# Patient Record
Sex: FEMALE | Race: WHITE | NOT HISPANIC OR LATINO | Employment: OTHER | URBAN - METROPOLITAN AREA
[De-identification: names, ages, dates, MRNs, and addresses within clinical notes are randomized per-mention and may not be internally consistent; named-entity substitution may affect disease eponyms.]

---

## 2018-01-01 ENCOUNTER — HOSPITAL ENCOUNTER (INPATIENT)
Facility: HOSPITAL | Age: 83
LOS: 4 days | Discharge: NURSING, REHAB OR LONG-TERM CARE | End: 2018-11-20
Attending: EMERGENCY MEDICINE | Admitting: INTERNAL MEDICINE

## 2018-01-01 ENCOUNTER — APPOINTMENT (OUTPATIENT)
Dept: GENERAL RADIOLOGY | Facility: HOSPITAL | Age: 83
End: 2018-01-01

## 2018-01-01 ENCOUNTER — HOSPITAL ENCOUNTER (EMERGENCY)
Facility: HOSPITAL | Age: 83
Discharge: HOME | End: 2018-05-02
Attending: EMERGENCY MEDICINE | Admitting: EMERGENCY MEDICINE

## 2018-01-01 ENCOUNTER — APPOINTMENT (OUTPATIENT)
Dept: CT IMAGING | Facility: HOSPITAL | Age: 83
End: 2018-01-01

## 2018-01-01 ENCOUNTER — HOSPITAL ENCOUNTER (EMERGENCY)
Facility: HOSPITAL | Age: 83
Discharge: HOME | End: 2018-12-23
Attending: EMERGENCY MEDICINE | Admitting: EMERGENCY MEDICINE

## 2018-01-01 ENCOUNTER — APPOINTMENT (OUTPATIENT)
Dept: MRI IMAGING | Facility: HOSPITAL | Age: 83
End: 2018-01-01

## 2018-01-01 ENCOUNTER — APPOINTMENT (OUTPATIENT)
Dept: CARDIOLOGY | Facility: HOSPITAL | Age: 83
End: 2018-01-01

## 2018-01-01 ENCOUNTER — HOSPITAL ENCOUNTER (EMERGENCY)
Facility: HOSPITAL | Age: 83
Discharge: HOME | End: 2018-12-26
Attending: EMERGENCY MEDICINE | Admitting: EMERGENCY MEDICINE

## 2018-01-01 ENCOUNTER — HOSPITAL ENCOUNTER (EMERGENCY)
Facility: HOSPITAL | Age: 83
Discharge: HOME | End: 2018-12-29
Attending: EMERGENCY MEDICINE | Admitting: EMERGENCY MEDICINE

## 2018-01-01 ENCOUNTER — LAB REQUISITION (OUTPATIENT)
Dept: LAB | Facility: HOSPITAL | Age: 83
End: 2018-01-01

## 2018-01-01 ENCOUNTER — OFFICE VISIT (OUTPATIENT)
Dept: ORTHOPEDIC SURGERY | Facility: CLINIC | Age: 83
End: 2018-01-01

## 2018-01-01 VITALS
HEART RATE: 62 BPM | DIASTOLIC BLOOD PRESSURE: 77 MMHG | WEIGHT: 132.28 LBS | HEIGHT: 67 IN | BODY MASS INDEX: 20.76 KG/M2 | TEMPERATURE: 97.7 F | OXYGEN SATURATION: 94 % | SYSTOLIC BLOOD PRESSURE: 159 MMHG | RESPIRATION RATE: 18 BRPM

## 2018-01-01 VITALS — HEART RATE: 80 BPM | OXYGEN SATURATION: 97 % | BODY MASS INDEX: 21.18 KG/M2 | HEIGHT: 62 IN | WEIGHT: 115.08 LBS

## 2018-01-01 VITALS
WEIGHT: 135 LBS | TEMPERATURE: 97.8 F | HEIGHT: 67 IN | OXYGEN SATURATION: 100 % | HEART RATE: 68 BPM | DIASTOLIC BLOOD PRESSURE: 80 MMHG | BODY MASS INDEX: 21.19 KG/M2 | SYSTOLIC BLOOD PRESSURE: 142 MMHG | RESPIRATION RATE: 14 BRPM

## 2018-01-01 VITALS
BODY MASS INDEX: 21.16 KG/M2 | TEMPERATURE: 98.3 F | SYSTOLIC BLOOD PRESSURE: 192 MMHG | OXYGEN SATURATION: 95 % | HEIGHT: 62 IN | RESPIRATION RATE: 16 BRPM | DIASTOLIC BLOOD PRESSURE: 90 MMHG | WEIGHT: 115 LBS | HEART RATE: 72 BPM

## 2018-01-01 VITALS
DIASTOLIC BLOOD PRESSURE: 95 MMHG | BODY MASS INDEX: 18.4 KG/M2 | SYSTOLIC BLOOD PRESSURE: 174 MMHG | HEART RATE: 75 BPM | WEIGHT: 100 LBS | TEMPERATURE: 97.8 F | HEIGHT: 62 IN | OXYGEN SATURATION: 95 % | RESPIRATION RATE: 16 BRPM

## 2018-01-01 VITALS
DIASTOLIC BLOOD PRESSURE: 90 MMHG | SYSTOLIC BLOOD PRESSURE: 166 MMHG | RESPIRATION RATE: 16 BRPM | HEART RATE: 72 BPM | OXYGEN SATURATION: 93 % | HEIGHT: 70 IN | TEMPERATURE: 97.5 F | WEIGHT: 120 LBS | BODY MASS INDEX: 17.18 KG/M2

## 2018-01-01 DIAGNOSIS — N39.0 BACTERIAL UTI: ICD-10-CM

## 2018-01-01 DIAGNOSIS — H10.32 ACUTE CONJUNCTIVITIS OF LEFT EYE, UNSPECIFIED ACUTE CONJUNCTIVITIS TYPE: ICD-10-CM

## 2018-01-01 DIAGNOSIS — I67.82 CEREBRAL ISCHEMIA: ICD-10-CM

## 2018-01-01 DIAGNOSIS — G93.40 ENCEPHALOPATHY ACUTE: ICD-10-CM
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
## 2018-01-01 DIAGNOSIS — R13.10 DYSPHAGIA, UNSPECIFIED TYPE: Primary | ICD-10-CM

## 2018-01-01 DIAGNOSIS — R53.1 GENERALIZED WEAKNESS: ICD-10-CM

## 2018-01-01 DIAGNOSIS — A49.9 BACTERIAL UTI: ICD-10-CM

## 2018-01-01 DIAGNOSIS — Z78.9 IMPAIRED MOBILITY AND ADLS: ICD-10-CM

## 2018-01-01 DIAGNOSIS — S09.90XA CLOSED HEAD INJURY, INITIAL ENCOUNTER: Primary | ICD-10-CM

## 2018-01-01 DIAGNOSIS — R55 VASOVAGAL SYNCOPE: Primary | ICD-10-CM

## 2018-01-01 DIAGNOSIS — H10.9 CONJUNCTIVITIS OF LEFT EYE, UNSPECIFIED CONJUNCTIVITIS TYPE: ICD-10-CM

## 2018-01-01 DIAGNOSIS — S52.532A CLOSED COLLES' FRACTURE OF LEFT RADIUS, INITIAL ENCOUNTER: Primary | ICD-10-CM

## 2018-01-01 DIAGNOSIS — E87.6 HYPOKALEMIA: ICD-10-CM

## 2018-01-01 DIAGNOSIS — Z00.00 ROUTINE GENERAL MEDICAL EXAMINATION AT A HEALTH CARE FACILITY: ICD-10-CM

## 2018-01-01 DIAGNOSIS — J40 BRONCHITIS: ICD-10-CM

## 2018-01-01 DIAGNOSIS — R53.83 LETHARGY: Primary | ICD-10-CM

## 2018-01-01 DIAGNOSIS — Z74.09 IMPAIRED MOBILITY AND ADLS: ICD-10-CM

## 2018-01-01 DIAGNOSIS — S62.102A LEFT WRIST FRACTURE, CLOSED, INITIAL ENCOUNTER: ICD-10-CM
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
## 2018-01-01 DIAGNOSIS — R30.0 DYSURIA: ICD-10-CM

## 2018-01-01 DIAGNOSIS — R04.0 EPISTAXIS: ICD-10-CM

## 2018-01-01 DIAGNOSIS — Z74.09 IMPAIRED FUNCTIONAL MOBILITY, BALANCE, GAIT, AND ENDURANCE: ICD-10-CM

## 2018-01-01 LAB
ALBUMIN SERPL-MCNC: 3.6 G/DL (ref 3.2–4.8)
ALBUMIN SERPL-MCNC: 4.11 G/DL (ref 3.2–4.8)
ALBUMIN SERPL-MCNC: 4.17 G/DL (ref 3.2–4.8)
ALBUMIN SERPL-MCNC: 4.3 G/DL (ref 3.2–4.8)
ALBUMIN/GLOB SERPL: 1.3 G/DL (ref 1.5–2.5)
ALBUMIN/GLOB SERPL: 1.5 G/DL (ref 1.5–2.5)
ALBUMIN/GLOB SERPL: 1.6 G/DL (ref 1.5–2.5)
ALBUMIN/GLOB SERPL: 1.7 G/DL (ref 1.5–2.5)
ALP SERPL-CCNC: 57 U/L (ref 25–100)
ALP SERPL-CCNC: 66 U/L (ref 25–100)
ALP SERPL-CCNC: 76 U/L (ref 25–100)
ALP SERPL-CCNC: 82 U/L (ref 25–100)
ALT SERPL W P-5'-P-CCNC: 13 U/L (ref 7–40)
ALT SERPL W P-5'-P-CCNC: 16 U/L (ref 7–40)
ALT SERPL W P-5'-P-CCNC: 19 U/L (ref 7–40)
ALT SERPL W P-5'-P-CCNC: 26 U/L (ref 7–40)
ANION GAP SERPL CALCULATED.3IONS-SCNC: 10 MMOL/L (ref 3–11)
ANION GAP SERPL CALCULATED.3IONS-SCNC: 5 MMOL/L (ref 3–11)
ANION GAP SERPL CALCULATED.3IONS-SCNC: 8 MMOL/L (ref 3–11)
ANION GAP SERPL CALCULATED.3IONS-SCNC: 9 MMOL/L (ref 3–11)
ARTICHOKE IGE QN: 136 MG/DL (ref 0–130)
ARTICHOKE IGE QN: 137 MG/DL (ref 0–130)
AST SERPL-CCNC: 15 U/L (ref 0–33)
AST SERPL-CCNC: 17 U/L (ref 0–33)
AST SERPL-CCNC: 18 U/L (ref 0–33)
AST SERPL-CCNC: 20 U/L (ref 0–33)
BACTERIA SPEC AEROBE CULT: ABNORMAL
BACTERIA SPEC AEROBE CULT: NORMAL
BACTERIA UR QL AUTO: ABNORMAL /HPF
BACTERIA UR QL AUTO: NORMAL /HPF
BASOPHILS # BLD AUTO: 0 10*3/MM3 (ref 0–0.2)
BASOPHILS # BLD AUTO: 0.01 10*3/MM3 (ref 0–0.2)
BASOPHILS # BLD AUTO: 0.02 10*3/MM3 (ref 0–0.2)
BASOPHILS # BLD AUTO: 0.02 10*3/MM3 (ref 0–0.2)
BASOPHILS NFR BLD AUTO: 0 % (ref 0–1)
BASOPHILS NFR BLD AUTO: 0.1 % (ref 0–1)
BASOPHILS NFR BLD AUTO: 0.2 % (ref 0–1)
BH CV ECHO MEAS - AO MAX PG (FULL): 0.71 MMHG
BH CV ECHO MEAS - AO MAX PG: 6 MMHG
BH CV ECHO MEAS - AO MEAN PG (FULL): 0.67 MMHG
BH CV ECHO MEAS - AO MEAN PG: 3.6 MMHG
BH CV ECHO MEAS - AO ROOT AREA: 7.7 CM^2
BH CV ECHO MEAS - AO ROOT DIAM: 3.1 CM
BH CV ECHO MEAS - AO V2 MAX: 124.9 CM/SEC
BH CV ECHO MEAS - AO V2 MEAN: 89.4 CM/SEC
BH CV ECHO MEAS - AO V2 VTI: 29.3 CM
BH CV ECHO MEAS - ASC AORTA: 2.8 CM
BH CV ECHO MEAS - AVA(I,A): 2.6 CM^2
BH CV ECHO MEAS - AVA(I,D): 2.6 CM^2
BH CV ECHO MEAS - AVA(V,A): 2.8 CM^2
BH CV ECHO MEAS - AVA(V,D): 2.8 CM^2
BH CV ECHO MEAS - BSA(HAYCOCK): 1.7 M^2
BH CV ECHO MEAS - BSA: 1.7 M^2
BH CV ECHO MEAS - BZI_BMI: 20.7 KILOGRAMS/M^2
BH CV ECHO MEAS - BZI_METRIC_HEIGHT: 170.2 CM
BH CV ECHO MEAS - BZI_METRIC_WEIGHT: 59.9 KG
BH CV ECHO MEAS - EDV(CUBED): 74.5 ML
BH CV ECHO MEAS - EDV(TEICH): 78.9 ML
BH CV ECHO MEAS - EF(CUBED): 67.8 %
BH CV ECHO MEAS - EF(TEICH): 59.7 %
BH CV ECHO MEAS - ESV(CUBED): 24 ML
BH CV ECHO MEAS - ESV(TEICH): 31.8 ML
BH CV ECHO MEAS - FS: 31.5 %
BH CV ECHO MEAS - IVS/LVPW: 0.76
BH CV ECHO MEAS - IVSD: 0.88 CM
BH CV ECHO MEAS - LA DIMENSION: 2.7 CM
BH CV ECHO MEAS - LA/AO: 0.87
BH CV ECHO MEAS - LAT PEAK E' VEL: 14 CM/SEC
BH CV ECHO MEAS - LATERAL E/E' RATIO: 5.3
BH CV ECHO MEAS - LV MASS(C)D: 140.7 GRAMS
BH CV ECHO MEAS - LV MAX PG: 5.3 MMHG
BH CV ECHO MEAS - LV MEAN PG: 2.9 MMHG
BH CV ECHO MEAS - LV V1 MAX: 115 CM/SEC
BH CV ECHO MEAS - LV V1 MEAN: 77.4 CM/SEC
BH CV ECHO MEAS - LV V1 VTI: 25.1 CM
BH CV ECHO MEAS - LVIDD: 4.2 CM
BH CV ECHO MEAS - LVIDS: 2.9 CM
BH CV ECHO MEAS - LVOT AREA (M): 3.1 CM^2
BH CV ECHO MEAS - LVOT AREA: 3.1 CM^2
BH CV ECHO MEAS - LVOT DIAM: 2 CM
BH CV ECHO MEAS - LVPWD: 1.2 CM
BH CV ECHO MEAS - MED PEAK E' VEL: 4.9 CM/SEC
BH CV ECHO MEAS - MEDIAL E/E' RATIO: 14.8
BH CV ECHO MEAS - MV A MAX VEL: 89.8 CM/SEC
BH CV ECHO MEAS - MV DEC TIME: 0.18 SEC
BH CV ECHO MEAS - MV E MAX VEL: 75 CM/SEC
BH CV ECHO MEAS - MV E/A: 0.84
BH CV ECHO MEAS - PA ACC SLOPE: 483.3 CM/SEC^2
BH CV ECHO MEAS - PA ACC TIME: 0.13 SEC
BH CV ECHO MEAS - PA MAX PG: 3.3 MMHG
BH CV ECHO MEAS - PA PR(ACCEL): 18.8 MMHG
BH CV ECHO MEAS - PA V2 MAX: 91.2 CM/SEC
BH CV ECHO MEAS - RAP SYSTOLE: 8 MMHG
BH CV ECHO MEAS - RVSP: 32 MMHG
BH CV ECHO MEAS - SV(AO): 226.4 ML
BH CV ECHO MEAS - SV(CUBED): 50.5 ML
BH CV ECHO MEAS - SV(LVOT): 77.1 ML
BH CV ECHO MEAS - SV(TEICH): 47.1 ML
BH CV ECHO MEAS - TAPSE (>1.6): 1.5 CM2
BH CV ECHO MEAS - TR MAX PG: 24 MMHG
BH CV ECHO MEAS - TR MAX VEL: 245.5 CM/SEC
BH CV ECHO MEAS - TV MAX PG: 0.62 MMHG
BH CV ECHO MEAS - TV V2 MAX: 39.5 CM/SEC
BH CV ECHO MEASUREMENTS AVERAGE E/E' RATIO: 7.94
BH CV XLRA - RV BASE: 3.1 CM
BH CV XLRA - RV LENGTH: 7.3 CM
BH CV XLRA - RV MID: 2.6 CM
BH CV XLRA - TDI S': 12.7 CM/SEC
BH CV XLRA MEAS LEFT DIST CCA EDV: 14.1 CM/SEC
BH CV XLRA MEAS LEFT DIST CCA PSV: 58.1 CM/SEC
BH CV XLRA MEAS LEFT DIST ICA EDV: 17.6 CM/SEC
BH CV XLRA MEAS LEFT DIST ICA PSV: 78.6 CM/SEC
BH CV XLRA MEAS LEFT ICA/CCA RATIO: 1.2
BH CV XLRA MEAS LEFT MID CCA EDV: 14.9 CM/SEC
BH CV XLRA MEAS LEFT MID CCA PSV: 78.6 CM/SEC
BH CV XLRA MEAS LEFT MID ICA EDV: 14.5 CM/SEC
BH CV XLRA MEAS LEFT MID ICA PSV: 71 CM/SEC
BH CV XLRA MEAS LEFT PROX CCA EDV: 13.4 CM/SEC
BH CV XLRA MEAS LEFT PROX CCA PSV: 83.3 CM/SEC
BH CV XLRA MEAS LEFT PROX ECA PSV: 65.4 CM/SEC
BH CV XLRA MEAS LEFT PROX ICA EDV: 10.7 CM/SEC
BH CV XLRA MEAS LEFT PROX ICA PSV: 44.6 CM/SEC
BH CV XLRA MEAS LEFT PROX SCLA PSV: 110 CM/SEC
BH CV XLRA MEAS LEFT VERTEBRAL A EDV: 11.9 CM/SEC
BH CV XLRA MEAS LEFT VERTEBRAL A PSV: 36.5 CM/SEC
BH CV XLRA MEAS RIGHT DIST CCA EDV: 9.4 CM/SEC
BH CV XLRA MEAS RIGHT DIST CCA PSV: 44.7 CM/SEC
BH CV XLRA MEAS RIGHT DIST ICA EDV: 11.2 CM/SEC
BH CV XLRA MEAS RIGHT DIST ICA PSV: 61.5 CM/SEC
BH CV XLRA MEAS RIGHT ICA/CCA RATIO: 1.04
BH CV XLRA MEAS RIGHT MID CCA EDV: 9.4 CM/SEC
BH CV XLRA MEAS RIGHT MID CCA PSV: 64 CM/SEC
BH CV XLRA MEAS RIGHT MID ICA EDV: 10.8 CM/SEC
BH CV XLRA MEAS RIGHT MID ICA PSV: 47.8 CM/SEC
BH CV XLRA MEAS RIGHT PROX CCA EDV: 7.7 CM/SEC
BH CV XLRA MEAS RIGHT PROX CCA PSV: 70.6 CM/SEC
BH CV XLRA MEAS RIGHT PROX ECA PSV: 100.4 CM/SEC
BH CV XLRA MEAS RIGHT PROX ICA EDV: 8.4 CM/SEC
BH CV XLRA MEAS RIGHT PROX ICA PSV: 42.2 CM/SEC
BH CV XLRA MEAS RIGHT PROX SCLA PSV: 42.6 CM/SEC
BH CV XLRA MEAS RIGHT VERTEBRAL A EDV: 7.9 CM/SEC
BH CV XLRA MEAS RIGHT VERTEBRAL A PSV: 44.1 CM/SEC
BILIRUB SERPL-MCNC: 0.2 MG/DL (ref 0.3–1.2)
BILIRUB SERPL-MCNC: 0.4 MG/DL (ref 0.3–1.2)
BILIRUB SERPL-MCNC: 0.5 MG/DL (ref 0.3–1.2)
BILIRUB SERPL-MCNC: 0.6 MG/DL (ref 0.3–1.2)
BILIRUB UR QL STRIP: NEGATIVE
BUN BLD-MCNC: 13 MG/DL (ref 9–23)
BUN BLD-MCNC: 14 MG/DL (ref 9–23)
BUN BLD-MCNC: 14 MG/DL (ref 9–23)
BUN BLD-MCNC: 15 MG/DL (ref 9–23)
BUN BLD-MCNC: 16 MG/DL (ref 9–23)
BUN BLD-MCNC: 16 MG/DL (ref 9–23)
BUN BLD-MCNC: 19 MG/DL (ref 9–23)
BUN/CREAT SERPL: 18.3 (ref 7–25)
BUN/CREAT SERPL: 18.5 (ref 7–25)
BUN/CREAT SERPL: 19.2 (ref 7–25)
BUN/CREAT SERPL: 21.6 (ref 7–25)
BUN/CREAT SERPL: 23.3 (ref 7–25)
BUN/CREAT SERPL: 24.2 (ref 7–25)
BUN/CREAT SERPL: 28.8 (ref 7–25)
CALCIUM SPEC-SCNC: 10 MG/DL (ref 8.7–10.4)
CALCIUM SPEC-SCNC: 10.2 MG/DL (ref 8.7–10.4)
CALCIUM SPEC-SCNC: 10.2 MG/DL (ref 8.7–10.4)
CALCIUM SPEC-SCNC: 8.9 MG/DL (ref 8.7–10.4)
CALCIUM SPEC-SCNC: 9 MG/DL (ref 8.7–10.4)
CALCIUM SPEC-SCNC: 9.2 MG/DL (ref 8.7–10.4)
CALCIUM SPEC-SCNC: 9.9 MG/DL (ref 8.7–10.4)
CHLORIDE SERPL-SCNC: 100 MMOL/L (ref 99–109)
CHLORIDE SERPL-SCNC: 101 MMOL/L (ref 99–109)
CHLORIDE SERPL-SCNC: 102 MMOL/L (ref 99–109)
CHLORIDE SERPL-SCNC: 103 MMOL/L (ref 99–109)
CHLORIDE SERPL-SCNC: 104 MMOL/L (ref 99–109)
CHLORIDE SERPL-SCNC: 107 MMOL/L (ref 99–109)
CHLORIDE SERPL-SCNC: 98 MMOL/L (ref 99–109)
CHOLEST SERPL-MCNC: 184 MG/DL (ref 0–200)
CHOLEST SERPL-MCNC: 185 MG/DL (ref 0–200)
CLARITY UR: ABNORMAL
CLARITY UR: ABNORMAL
CLARITY UR: CLEAR
CO2 SERPL-SCNC: 25 MMOL/L (ref 20–31)
CO2 SERPL-SCNC: 27 MMOL/L (ref 20–31)
CO2 SERPL-SCNC: 27 MMOL/L (ref 20–31)
CO2 SERPL-SCNC: 28 MMOL/L (ref 20–31)
CO2 SERPL-SCNC: 28 MMOL/L (ref 20–31)
CO2 SERPL-SCNC: 29 MMOL/L (ref 20–31)
CO2 SERPL-SCNC: 29 MMOL/L (ref 20–31)
COD CRY URNS QL: ABNORMAL /HPF
COLOR UR: ABNORMAL
COLOR UR: ABNORMAL
COLOR UR: YELLOW
CREAT BLD-MCNC: 0.6 MG/DL (ref 0.6–1.3)
CREAT BLD-MCNC: 0.66 MG/DL (ref 0.6–1.3)
CREAT BLD-MCNC: 0.66 MG/DL (ref 0.6–1.3)
CREAT BLD-MCNC: 0.71 MG/DL (ref 0.6–1.3)
CREAT BLD-MCNC: 0.73 MG/DL (ref 0.6–1.3)
CREAT BLD-MCNC: 0.74 MG/DL (ref 0.6–1.3)
CREAT BLD-MCNC: 0.81 MG/DL (ref 0.6–1.3)
DEPRECATED RDW RBC AUTO: 41.8 FL (ref 37–54)
DEPRECATED RDW RBC AUTO: 42.3 FL (ref 37–54)
DEPRECATED RDW RBC AUTO: 42.6 FL (ref 37–54)
DEPRECATED RDW RBC AUTO: 44.6 FL (ref 37–54)
DEPRECATED RDW RBC AUTO: 45.7 FL (ref 37–54)
DEPRECATED RDW RBC AUTO: 46.2 FL (ref 37–54)
DEPRECATED RDW RBC AUTO: 46.5 FL (ref 37–54)
EOSINOPHIL # BLD AUTO: 0 10*3/MM3 (ref 0–0.3)
EOSINOPHIL # BLD AUTO: 0.01 10*3/MM3 (ref 0–0.3)
EOSINOPHIL # BLD AUTO: 0.02 10*3/MM3 (ref 0–0.3)
EOSINOPHIL # BLD AUTO: 0.05 10*3/MM3 (ref 0–0.3)
EOSINOPHIL # BLD AUTO: 0.11 10*3/MM3 (ref 0–0.3)
EOSINOPHIL # BLD AUTO: 0.18 10*3/MM3 (ref 0–0.3)
EOSINOPHIL NFR BLD AUTO: 0 % (ref 0–3)
EOSINOPHIL NFR BLD AUTO: 0.1 % (ref 0–3)
EOSINOPHIL NFR BLD AUTO: 0.1 % (ref 0–3)
EOSINOPHIL NFR BLD AUTO: 0.6 % (ref 0–3)
EOSINOPHIL NFR BLD AUTO: 0.9 % (ref 0–3)
EOSINOPHIL NFR BLD AUTO: 2.1 % (ref 0–3)
ERYTHROCYTE [DISTWIDTH] IN BLOOD BY AUTOMATED COUNT: 12.7 % (ref 11.3–14.5)
ERYTHROCYTE [DISTWIDTH] IN BLOOD BY AUTOMATED COUNT: 12.8 % (ref 11.3–14.5)
ERYTHROCYTE [DISTWIDTH] IN BLOOD BY AUTOMATED COUNT: 12.8 % (ref 11.3–14.5)
ERYTHROCYTE [DISTWIDTH] IN BLOOD BY AUTOMATED COUNT: 13.5 % (ref 11.3–14.5)
ERYTHROCYTE [DISTWIDTH] IN BLOOD BY AUTOMATED COUNT: 13.6 % (ref 11.3–14.5)
FLUAV AG NPH QL: NEGATIVE
FLUBV AG NPH QL IA: NEGATIVE
GFR SERPL CREATININE-BSD FRML MDRD: 67 ML/MIN/1.73
GFR SERPL CREATININE-BSD FRML MDRD: 74 ML/MIN/1.73
GFR SERPL CREATININE-BSD FRML MDRD: 75 ML/MIN/1.73
GFR SERPL CREATININE-BSD FRML MDRD: 78 ML/MIN/1.73
GFR SERPL CREATININE-BSD FRML MDRD: 85 ML/MIN/1.73
GFR SERPL CREATININE-BSD FRML MDRD: 85 ML/MIN/1.73
GFR SERPL CREATININE-BSD FRML MDRD: 95 ML/MIN/1.73
GLOBULIN UR ELPH-MCNC: 2.5 GM/DL
GLOBULIN UR ELPH-MCNC: 2.5 GM/DL
GLOBULIN UR ELPH-MCNC: 2.8 GM/DL
GLOBULIN UR ELPH-MCNC: 2.8 GM/DL
GLUCOSE BLD-MCNC: 120 MG/DL (ref 70–100)
GLUCOSE BLD-MCNC: 141 MG/DL (ref 70–100)
GLUCOSE BLD-MCNC: 149 MG/DL (ref 70–100)
GLUCOSE BLD-MCNC: 176 MG/DL (ref 70–100)
GLUCOSE BLD-MCNC: 192 MG/DL (ref 70–100)
GLUCOSE BLD-MCNC: 195 MG/DL (ref 70–100)
GLUCOSE BLD-MCNC: 201 MG/DL (ref 70–100)
GLUCOSE BLDC GLUCOMTR-MCNC: 123 MG/DL (ref 70–130)
GLUCOSE BLDC GLUCOMTR-MCNC: 136 MG/DL (ref 70–130)
GLUCOSE BLDC GLUCOMTR-MCNC: 138 MG/DL (ref 70–130)
GLUCOSE BLDC GLUCOMTR-MCNC: 146 MG/DL (ref 70–130)
GLUCOSE BLDC GLUCOMTR-MCNC: 154 MG/DL (ref 70–130)
GLUCOSE BLDC GLUCOMTR-MCNC: 155 MG/DL (ref 70–130)
GLUCOSE BLDC GLUCOMTR-MCNC: 157 MG/DL (ref 70–130)
GLUCOSE BLDC GLUCOMTR-MCNC: 164 MG/DL (ref 70–130)
GLUCOSE BLDC GLUCOMTR-MCNC: 178 MG/DL (ref 70–130)
GLUCOSE BLDC GLUCOMTR-MCNC: 187 MG/DL (ref 70–130)
GLUCOSE BLDC GLUCOMTR-MCNC: 96 MG/DL (ref 70–130)
GLUCOSE BLDC GLUCOMTR-MCNC: 99 MG/DL (ref 70–130)
GLUCOSE UR STRIP-MCNC: ABNORMAL MG/DL
GLUCOSE UR STRIP-MCNC: NEGATIVE MG/DL
HBA1C MFR BLD: 6.5 % (ref 4.8–5.6)
HBA1C MFR BLD: 6.7 % (ref 4.8–5.6)
HCT VFR BLD AUTO: 35.7 % (ref 34.5–44)
HCT VFR BLD AUTO: 35.9 % (ref 34.5–44)
HCT VFR BLD AUTO: 37.5 % (ref 34.5–44)
HCT VFR BLD AUTO: 39.6 % (ref 34.5–44)
HCT VFR BLD AUTO: 40.7 % (ref 34.5–44)
HCT VFR BLD AUTO: 42 % (ref 34.5–44)
HCT VFR BLD AUTO: 42.5 % (ref 34.5–44)
HDLC SERPL-MCNC: 43 MG/DL (ref 40–60)
HDLC SERPL-MCNC: 43 MG/DL (ref 40–60)
HGB BLD-MCNC: 11.5 G/DL (ref 11.5–15.5)
HGB BLD-MCNC: 11.8 G/DL (ref 11.5–15.5)
HGB BLD-MCNC: 11.9 G/DL (ref 11.5–15.5)
HGB BLD-MCNC: 12.7 G/DL (ref 11.5–15.5)
HGB BLD-MCNC: 13.6 G/DL (ref 11.5–15.5)
HGB BLD-MCNC: 13.8 G/DL (ref 11.5–15.5)
HGB BLD-MCNC: 13.9 G/DL (ref 11.5–15.5)
HGB UR QL STRIP.AUTO: ABNORMAL
HGB UR QL STRIP.AUTO: NEGATIVE
HOLD SPECIMEN: NORMAL
HYALINE CASTS UR QL AUTO: ABNORMAL /LPF
HYALINE CASTS UR QL AUTO: NORMAL /LPF
IMM GRANULOCYTES # BLD AUTO: 0.03 10*3/MM3 (ref 0–0.03)
IMM GRANULOCYTES # BLD AUTO: 0.03 10*3/MM3 (ref 0–0.03)
IMM GRANULOCYTES # BLD AUTO: 0.07 10*3/MM3 (ref 0–0.03)
IMM GRANULOCYTES # BLD: 0.01 10*3/MM3 (ref 0–0.03)
IMM GRANULOCYTES # BLD: 0.03 10*3/MM3 (ref 0–0.03)
IMM GRANULOCYTES # BLD: 0.07 10*3/MM3 (ref 0–0.03)
IMM GRANULOCYTES NFR BLD AUTO: 0.2 % (ref 0–0.6)
IMM GRANULOCYTES NFR BLD AUTO: 0.3 % (ref 0–0.6)
IMM GRANULOCYTES NFR BLD AUTO: 0.4 % (ref 0–0.6)
IMM GRANULOCYTES NFR BLD: 0.1 % (ref 0–0.6)
IMM GRANULOCYTES NFR BLD: 0.3 % (ref 0–0.6)
IMM GRANULOCYTES NFR BLD: 0.3 % (ref 0–0.6)
INR PPP: 0.99 (ref 0.91–1.09)
KETONES UR QL STRIP: ABNORMAL
KETONES UR QL STRIP: NEGATIVE
LEUKOCYTE ESTERASE UR QL STRIP.AUTO: ABNORMAL
LEUKOCYTE ESTERASE UR QL STRIP.AUTO: NEGATIVE
LIPASE SERPL-CCNC: 50 U/L (ref 6–51)
LV EF 2D ECHO EST: 60 %
LYMPHOCYTES # BLD AUTO: 1.36 10*3/MM3 (ref 0.6–4.8)
LYMPHOCYTES # BLD AUTO: 1.36 10*3/MM3 (ref 0.6–4.8)
LYMPHOCYTES # BLD AUTO: 1.38 10*3/MM3 (ref 0.6–4.8)
LYMPHOCYTES # BLD AUTO: 1.57 10*3/MM3 (ref 0.6–4.8)
LYMPHOCYTES # BLD AUTO: 2.28 10*3/MM3 (ref 0.6–4.8)
LYMPHOCYTES # BLD AUTO: 2.99 10*3/MM3 (ref 0.6–4.8)
LYMPHOCYTES NFR BLD AUTO: 12 % (ref 24–44)
LYMPHOCYTES NFR BLD AUTO: 15.6 % (ref 24–44)
LYMPHOCYTES NFR BLD AUTO: 19.5 % (ref 24–44)
LYMPHOCYTES NFR BLD AUTO: 35.5 % (ref 24–44)
LYMPHOCYTES NFR BLD AUTO: 6.2 % (ref 24–44)
LYMPHOCYTES NFR BLD AUTO: 7.8 % (ref 24–44)
MAGNESIUM SERPL-MCNC: 1.8 MG/DL (ref 1.3–2.7)
MAXIMAL PREDICTED HEART RATE: 133 BPM
MCH RBC QN AUTO: 29.4 PG (ref 27–31)
MCH RBC QN AUTO: 29.8 PG (ref 27–31)
MCH RBC QN AUTO: 29.8 PG (ref 27–31)
MCH RBC QN AUTO: 29.9 PG (ref 27–31)
MCH RBC QN AUTO: 29.9 PG (ref 27–31)
MCH RBC QN AUTO: 30 PG (ref 27–31)
MCH RBC QN AUTO: 30.2 PG (ref 27–31)
MCHC RBC AUTO-ENTMCNC: 31.7 G/DL (ref 32–36)
MCHC RBC AUTO-ENTMCNC: 32.1 G/DL (ref 32–36)
MCHC RBC AUTO-ENTMCNC: 32.2 G/DL (ref 32–36)
MCHC RBC AUTO-ENTMCNC: 32.7 G/DL (ref 32–36)
MCHC RBC AUTO-ENTMCNC: 32.9 G/DL (ref 32–36)
MCHC RBC AUTO-ENTMCNC: 32.9 G/DL (ref 32–36)
MCHC RBC AUTO-ENTMCNC: 33.4 G/DL (ref 32–36)
MCV RBC AUTO: 89.5 FL (ref 80–99)
MCV RBC AUTO: 90.2 FL (ref 80–99)
MCV RBC AUTO: 90.9 FL (ref 80–99)
MCV RBC AUTO: 91.2 FL (ref 80–99)
MCV RBC AUTO: 92.7 FL (ref 80–99)
MCV RBC AUTO: 93.6 FL (ref 80–99)
MCV RBC AUTO: 93.8 FL (ref 80–99)
MONOCYTES # BLD AUTO: 0.29 10*3/MM3 (ref 0–1)
MONOCYTES # BLD AUTO: 0.56 10*3/MM3 (ref 0–1)
MONOCYTES # BLD AUTO: 0.7 10*3/MM3 (ref 0–1)
MONOCYTES # BLD AUTO: 0.75 10*3/MM3 (ref 0–1)
MONOCYTES # BLD AUTO: 0.86 10*3/MM3 (ref 0–1)
MONOCYTES # BLD AUTO: 1.07 10*3/MM3 (ref 0–1)
MONOCYTES NFR BLD AUTO: 3.2 % (ref 0–12)
MONOCYTES NFR BLD AUTO: 3.3 % (ref 0–12)
MONOCYTES NFR BLD AUTO: 4.8 % (ref 0–12)
MONOCYTES NFR BLD AUTO: 6 % (ref 0–12)
MONOCYTES NFR BLD AUTO: 6.6 % (ref 0–12)
MONOCYTES NFR BLD AUTO: 8.9 % (ref 0–12)
NEUTROPHILS # BLD AUTO: 10.67 10*3/MM3 (ref 1.5–8.3)
NEUTROPHILS # BLD AUTO: 15.58 10*3/MM3 (ref 1.5–8.3)
NEUTROPHILS # BLD AUTO: 19.68 10*3/MM3 (ref 1.5–8.3)
NEUTROPHILS # BLD AUTO: 4.5 10*3/MM3 (ref 1.5–8.3)
NEUTROPHILS # BLD AUTO: 7 10*3/MM3 (ref 1.5–8.3)
NEUTROPHILS # BLD AUTO: 8.59 10*3/MM3 (ref 1.5–8.3)
NEUTROPHILS NFR BLD AUTO: 53.4 % (ref 41–71)
NEUTROPHILS NFR BLD AUTO: 73.5 % (ref 41–71)
NEUTROPHILS NFR BLD AUTO: 80.3 % (ref 41–71)
NEUTROPHILS NFR BLD AUTO: 81.2 % (ref 41–71)
NEUTROPHILS NFR BLD AUTO: 88.4 % (ref 41–71)
NEUTROPHILS NFR BLD AUTO: 89 % (ref 41–71)
NITRITE UR QL STRIP: NEGATIVE
NITRITE UR QL STRIP: POSITIVE
PH UR STRIP.AUTO: 6 [PH] (ref 5–8)
PH UR STRIP.AUTO: 6 [PH] (ref 5–8)
PH UR STRIP.AUTO: 6.5 [PH] (ref 5–8)
PH UR STRIP.AUTO: 6.5 [PH] (ref 5–8)
PH UR STRIP.AUTO: 8 [PH] (ref 5–8)
PLATELET # BLD AUTO: 268 10*3/MM3 (ref 150–450)
PLATELET # BLD AUTO: 284 10*3/MM3 (ref 150–450)
PLATELET # BLD AUTO: 285 10*3/MM3 (ref 150–450)
PLATELET # BLD AUTO: 288 10*3/MM3 (ref 150–450)
PLATELET # BLD AUTO: 302 10*3/MM3 (ref 150–450)
PLATELET # BLD AUTO: 307 10*3/MM3 (ref 150–450)
PLATELET # BLD AUTO: 346 10*3/MM3 (ref 150–450)
PMV BLD AUTO: 10 FL (ref 6–12)
PMV BLD AUTO: 10.1 FL (ref 6–12)
PMV BLD AUTO: 10.2 FL (ref 6–12)
PMV BLD AUTO: 10.3 FL (ref 6–12)
PMV BLD AUTO: 9.7 FL (ref 6–12)
PMV BLD AUTO: 9.8 FL (ref 6–12)
PMV BLD AUTO: 9.9 FL (ref 6–12)
POTASSIUM BLD-SCNC: 3.1 MMOL/L (ref 3.5–5.5)
POTASSIUM BLD-SCNC: 3.2 MMOL/L (ref 3.5–5.5)
POTASSIUM BLD-SCNC: 3.3 MMOL/L (ref 3.5–5.5)
POTASSIUM BLD-SCNC: 3.7 MMOL/L (ref 3.5–5.5)
POTASSIUM BLD-SCNC: 3.8 MMOL/L (ref 3.5–5.5)
POTASSIUM BLD-SCNC: 3.9 MMOL/L (ref 3.5–5.5)
POTASSIUM BLD-SCNC: 3.9 MMOL/L (ref 3.5–5.5)
PROT SERPL-MCNC: 6.4 G/DL (ref 5.7–8.2)
PROT SERPL-MCNC: 6.6 G/DL (ref 5.7–8.2)
PROT SERPL-MCNC: 6.7 G/DL (ref 5.7–8.2)
PROT SERPL-MCNC: 7.1 G/DL (ref 5.7–8.2)
PROT UR QL STRIP: ABNORMAL
PROT UR QL STRIP: ABNORMAL
PROT UR QL STRIP: NEGATIVE
PROTHROMBIN TIME: 10.4 SECONDS (ref 9.6–11.5)
RBC # BLD AUTO: 3.85 10*6/MM3 (ref 3.89–5.14)
RBC # BLD AUTO: 4 10*6/MM3 (ref 3.89–5.14)
RBC # BLD AUTO: 4.01 10*6/MM3 (ref 3.89–5.14)
RBC # BLD AUTO: 4.23 10*6/MM3 (ref 3.89–5.14)
RBC # BLD AUTO: 4.51 10*6/MM3 (ref 3.89–5.14)
RBC # BLD AUTO: 4.62 10*6/MM3 (ref 3.89–5.14)
RBC # BLD AUTO: 4.66 10*6/MM3 (ref 3.89–5.14)
RBC # UR: ABNORMAL /HPF
RBC # UR: NORMAL /HPF
REF LAB TEST METHOD: ABNORMAL
REF LAB TEST METHOD: NORMAL
SODIUM BLD-SCNC: 135 MMOL/L (ref 132–146)
SODIUM BLD-SCNC: 136 MMOL/L (ref 132–146)
SODIUM BLD-SCNC: 137 MMOL/L (ref 132–146)
SODIUM BLD-SCNC: 137 MMOL/L (ref 132–146)
SODIUM BLD-SCNC: 139 MMOL/L (ref 132–146)
SODIUM BLD-SCNC: 140 MMOL/L (ref 132–146)
SODIUM BLD-SCNC: 143 MMOL/L (ref 132–146)
SP GR UR STRIP: 1.01 (ref 1–1.03)
SP GR UR STRIP: 1.02 (ref 1–1.03)
SQUAMOUS #/AREA URNS HPF: ABNORMAL /HPF
SQUAMOUS #/AREA URNS HPF: NORMAL /HPF
STRESS TARGET HR: 113 BPM
TRIGL SERPL-MCNC: 61 MG/DL (ref 0–150)
TRIGL SERPL-MCNC: 62 MG/DL (ref 0–150)
TROPONIN I SERPL-MCNC: 0 NG/ML (ref 0–0.07)
TROPONIN I SERPL-MCNC: 0 NG/ML (ref 0–0.07)
TROPONIN I SERPL-MCNC: 0.01 NG/ML
TROPONIN I SERPL-MCNC: <0.006 NG/ML
TSH SERPL DL<=0.05 MIU/L-ACNC: 0.49 MIU/ML (ref 0.35–5.35)
UROBILINOGEN UR QL STRIP: ABNORMAL
VIT B12 BLD-MCNC: 846 PG/ML (ref 211–911)
WBC NRBC COR # BLD: 11.69 10*3/MM3 (ref 3.5–10.8)
WBC NRBC COR # BLD: 13.12 10*3/MM3 (ref 3.5–10.8)
WBC NRBC COR # BLD: 17.63 10*3/MM3 (ref 3.5–10.8)
WBC NRBC COR # BLD: 22.11 10*3/MM3 (ref 3.5–10.8)
WBC NRBC COR # BLD: 8.22 10*3/MM3 (ref 3.5–10.8)
WBC NRBC COR # BLD: 8.43 10*3/MM3 (ref 3.5–10.8)
WBC NRBC COR # BLD: 8.72 10*3/MM3 (ref 3.5–10.8)
WBC UR QL AUTO: ABNORMAL /HPF
WBC UR QL AUTO: NORMAL /HPF
WHOLE BLOOD HOLD SPECIMEN: NORMAL
YEAST URNS QL MICRO: ABNORMAL /HPF

## 2018-01-01 PROCEDURE — 97166 OT EVAL MOD COMPLEX 45 MIN: CPT

## 2018-01-01 PROCEDURE — 97110 THERAPEUTIC EXERCISES: CPT

## 2018-01-01 PROCEDURE — 80048 BASIC METABOLIC PNL TOTAL CA: CPT | Performed by: NURSE PRACTITIONER

## 2018-01-01 PROCEDURE — 84484 ASSAY OF TROPONIN QUANT: CPT | Performed by: EMERGENCY MEDICINE

## 2018-01-01 PROCEDURE — 80053 COMPREHEN METABOLIC PANEL: CPT | Performed by: EMERGENCY MEDICINE

## 2018-01-01 PROCEDURE — 83735 ASSAY OF MAGNESIUM: CPT | Performed by: EMERGENCY MEDICINE

## 2018-01-01 PROCEDURE — 99232 SBSQ HOSP IP/OBS MODERATE 35: CPT | Performed by: PSYCHIATRY & NEUROLOGY

## 2018-01-01 PROCEDURE — 81001 URINALYSIS AUTO W/SCOPE: CPT | Performed by: EMERGENCY MEDICINE

## 2018-01-01 PROCEDURE — 71045 X-RAY EXAM CHEST 1 VIEW: CPT

## 2018-01-01 PROCEDURE — 80053 COMPREHEN METABOLIC PANEL: CPT | Performed by: PHYSICIAN ASSISTANT

## 2018-01-01 PROCEDURE — 85027 COMPLETE CBC AUTOMATED: CPT | Performed by: INTERNAL MEDICINE

## 2018-01-01 PROCEDURE — 80048 BASIC METABOLIC PNL TOTAL CA: CPT | Performed by: INTERNAL MEDICINE

## 2018-01-01 PROCEDURE — 82962 GLUCOSE BLOOD TEST: CPT

## 2018-01-01 PROCEDURE — 92523 SPEECH SOUND LANG COMPREHEN: CPT

## 2018-01-01 PROCEDURE — 85025 COMPLETE CBC W/AUTO DIFF WBC: CPT | Performed by: EMERGENCY MEDICINE

## 2018-01-01 PROCEDURE — 25010000002 CEFTRIAXONE PER 250 MG: Performed by: NURSE PRACTITIONER

## 2018-01-01 PROCEDURE — 81001 URINALYSIS AUTO W/SCOPE: CPT | Performed by: PHYSICIAN ASSISTANT

## 2018-01-01 PROCEDURE — 84484 ASSAY OF TROPONIN QUANT: CPT

## 2018-01-01 PROCEDURE — 93306 TTE W/DOPPLER COMPLETE: CPT

## 2018-01-01 PROCEDURE — 96360 HYDRATION IV INFUSION INIT: CPT

## 2018-01-01 PROCEDURE — 99223 1ST HOSP IP/OBS HIGH 75: CPT | Performed by: INTERNAL MEDICINE

## 2018-01-01 PROCEDURE — 99223 1ST HOSP IP/OBS HIGH 75: CPT | Performed by: PSYCHIATRY & NEUROLOGY

## 2018-01-01 PROCEDURE — 99232 SBSQ HOSP IP/OBS MODERATE 35: CPT | Performed by: INTERNAL MEDICINE

## 2018-01-01 PROCEDURE — 81001 URINALYSIS AUTO W/SCOPE: CPT

## 2018-01-01 PROCEDURE — 99203 OFFICE O/P NEW LOW 30 MIN: CPT | Performed by: ORTHOPAEDIC SURGERY

## 2018-01-01 PROCEDURE — 80061 LIPID PANEL: CPT | Performed by: NURSE PRACTITIONER

## 2018-01-01 PROCEDURE — 99284 EMERGENCY DEPT VISIT MOD MDM: CPT

## 2018-01-01 PROCEDURE — 83036 HEMOGLOBIN GLYCOSYLATED A1C: CPT | Performed by: INTERNAL MEDICINE

## 2018-01-01 PROCEDURE — 92526 ORAL FUNCTION THERAPY: CPT

## 2018-01-01 PROCEDURE — 97162 PT EVAL MOD COMPLEX 30 MIN: CPT

## 2018-01-01 PROCEDURE — 70551 MRI BRAIN STEM W/O DYE: CPT

## 2018-01-01 PROCEDURE — 99231 SBSQ HOSP IP/OBS SF/LOW 25: CPT | Performed by: PSYCHIATRY & NEUROLOGY

## 2018-01-01 PROCEDURE — 87804 INFLUENZA ASSAY W/OPTIC: CPT | Performed by: EMERGENCY MEDICINE

## 2018-01-01 PROCEDURE — 92610 EVALUATE SWALLOWING FUNCTION: CPT

## 2018-01-01 PROCEDURE — 85025 COMPLETE CBC W/AUTO DIFF WBC: CPT | Performed by: PHYSICIAN ASSISTANT

## 2018-01-01 PROCEDURE — 82607 VITAMIN B-12: CPT | Performed by: PSYCHIATRY & NEUROLOGY

## 2018-01-01 PROCEDURE — 93005 ELECTROCARDIOGRAM TRACING: CPT | Performed by: EMERGENCY MEDICINE

## 2018-01-01 PROCEDURE — 93306 TTE W/DOPPLER COMPLETE: CPT | Performed by: INTERNAL MEDICINE

## 2018-01-01 PROCEDURE — 85610 PROTHROMBIN TIME: CPT | Performed by: EMERGENCY MEDICINE

## 2018-01-01 PROCEDURE — P9612 CATHETERIZE FOR URINE SPEC: HCPCS

## 2018-01-01 PROCEDURE — 73110 X-RAY EXAM OF WRIST: CPT

## 2018-01-01 PROCEDURE — 63710000001 INSULIN LISPRO (HUMAN) PER 5 UNITS: Performed by: NURSE PRACTITIONER

## 2018-01-01 PROCEDURE — 92507 TX SP LANG VOICE COMM INDIV: CPT

## 2018-01-01 PROCEDURE — 25010000002 CEFTRIAXONE PER 250 MG: Performed by: EMERGENCY MEDICINE

## 2018-01-01 PROCEDURE — 70450 CT HEAD/BRAIN W/O DYE: CPT

## 2018-01-01 PROCEDURE — 83036 HEMOGLOBIN GLYCOSYLATED A1C: CPT | Performed by: NURSE PRACTITIONER

## 2018-01-01 PROCEDURE — 72125 CT NECK SPINE W/O DYE: CPT

## 2018-01-01 PROCEDURE — 97535 SELF CARE MNGMENT TRAINING: CPT | Performed by: OCCUPATIONAL THERAPIST

## 2018-01-01 PROCEDURE — 85025 COMPLETE CBC W/AUTO DIFF WBC: CPT | Performed by: NURSE PRACTITIONER

## 2018-01-01 PROCEDURE — 99285 EMERGENCY DEPT VISIT HI MDM: CPT

## 2018-01-01 PROCEDURE — 93005 ELECTROCARDIOGRAM TRACING: CPT | Performed by: PHYSICIAN ASSISTANT

## 2018-01-01 PROCEDURE — 87186 SC STD MICRODIL/AGAR DIL: CPT | Performed by: EMERGENCY MEDICINE

## 2018-01-01 PROCEDURE — 87077 CULTURE AEROBIC IDENTIFY: CPT | Performed by: EMERGENCY MEDICINE

## 2018-01-01 PROCEDURE — 97116 GAIT TRAINING THERAPY: CPT

## 2018-01-01 PROCEDURE — 87086 URINE CULTURE/COLONY COUNT: CPT | Performed by: EMERGENCY MEDICINE

## 2018-01-01 PROCEDURE — 80048 BASIC METABOLIC PNL TOTAL CA: CPT | Performed by: EMERGENCY MEDICINE

## 2018-01-01 PROCEDURE — 25600 CLTX DST RDL FX/EPHYS SEP WO: CPT | Performed by: ORTHOPAEDIC SURGERY

## 2018-01-01 PROCEDURE — 25010000002 LORAZEPAM PER 2 MG: Performed by: HOSPITALIST

## 2018-01-01 PROCEDURE — 99233 SBSQ HOSP IP/OBS HIGH 50: CPT | Performed by: INTERNAL MEDICINE

## 2018-01-01 PROCEDURE — 84484 ASSAY OF TROPONIN QUANT: CPT | Performed by: PHYSICIAN ASSISTANT

## 2018-01-01 PROCEDURE — 93880 EXTRACRANIAL BILAT STUDY: CPT | Performed by: INTERNAL MEDICINE

## 2018-01-01 PROCEDURE — 83690 ASSAY OF LIPASE: CPT | Performed by: PHYSICIAN ASSISTANT

## 2018-01-01 PROCEDURE — 80061 LIPID PANEL: CPT | Performed by: INTERNAL MEDICINE

## 2018-01-01 PROCEDURE — 93880 EXTRACRANIAL BILAT STUDY: CPT

## 2018-01-01 PROCEDURE — 84443 ASSAY THYROID STIM HORMONE: CPT | Performed by: NURSE PRACTITIONER

## 2018-01-01 PROCEDURE — 99239 HOSP IP/OBS DSCHRG MGMT >30: CPT | Performed by: NURSE PRACTITIONER

## 2018-01-01 RX ORDER — LISINOPRIL 10 MG/1
10 TABLET ORAL
Qty: 30 TABLET | Refills: 0
Start: 2018-01-01

## 2018-01-01 RX ORDER — QUETIAPINE FUMARATE 25 MG/1
25 TABLET, FILM COATED ORAL NIGHTLY
Status: DISCONTINUED | OUTPATIENT
Start: 2018-01-01 | End: 2018-01-01 | Stop reason: HOSPADM

## 2018-01-01 RX ORDER — SODIUM CHLORIDE 0.9 % (FLUSH) 0.9 %
10 SYRINGE (ML) INJECTION AS NEEDED
Status: DISCONTINUED | OUTPATIENT
Start: 2018-01-01 | End: 2018-01-01 | Stop reason: HOSPADM

## 2018-01-01 RX ORDER — SUCRALFATE 1 G/1
1 TABLET ORAL
Status: DISCONTINUED | OUTPATIENT
Start: 2018-01-01 | End: 2018-01-01 | Stop reason: HOSPADM

## 2018-01-01 RX ORDER — DONEPEZIL HYDROCHLORIDE 10 MG/1
5 TABLET, FILM COATED ORAL DAILY
Status: DISCONTINUED | OUTPATIENT
Start: 2018-01-01 | End: 2018-01-01 | Stop reason: HOSPADM

## 2018-01-01 RX ORDER — NICOTINE POLACRILEX 4 MG
15 LOZENGE BUCCAL
Status: DISCONTINUED | OUTPATIENT
Start: 2018-01-01 | End: 2018-01-01 | Stop reason: HOSPADM

## 2018-01-01 RX ORDER — ACETAMINOPHEN 325 MG/1
650 TABLET ORAL EVERY 4 HOURS PRN
Status: DISCONTINUED | OUTPATIENT
Start: 2018-01-01 | End: 2018-01-01 | Stop reason: HOSPADM

## 2018-01-01 RX ORDER — QUETIAPINE FUMARATE 25 MG/1
25 TABLET, FILM COATED ORAL NIGHTLY
Qty: 30 TABLET | Refills: 0
Start: 2018-01-01

## 2018-01-01 RX ORDER — DOCUSATE SODIUM 100 MG/1
100 CAPSULE, LIQUID FILLED ORAL DAILY
COMMUNITY

## 2018-01-01 RX ORDER — LORAZEPAM 2 MG/ML
0.5 INJECTION INTRAMUSCULAR ONCE
Status: COMPLETED | OUTPATIENT
Start: 2018-01-01 | End: 2018-01-01

## 2018-01-01 RX ORDER — OXYMETAZOLINE HYDROCHLORIDE 0.05 G/100ML
1 SPRAY NASAL ONCE
Status: COMPLETED | OUTPATIENT
Start: 2018-01-01 | End: 2018-01-01

## 2018-01-01 RX ORDER — CEFTRIAXONE SODIUM 1 G/50ML
1 INJECTION, SOLUTION INTRAVENOUS EVERY 24 HOURS
Status: DISCONTINUED | OUTPATIENT
Start: 2018-01-01 | End: 2018-01-01

## 2018-01-01 RX ORDER — POTASSIUM CHLORIDE 1.5 G/1.77G
40 POWDER, FOR SOLUTION ORAL AS NEEDED
Status: DISCONTINUED | OUTPATIENT
Start: 2018-01-01 | End: 2018-01-01 | Stop reason: HOSPADM

## 2018-01-01 RX ORDER — MAGNESIUM SULFATE HEPTAHYDRATE 40 MG/ML
2 INJECTION, SOLUTION INTRAVENOUS AS NEEDED
Status: DISCONTINUED | OUTPATIENT
Start: 2018-01-01 | End: 2018-01-01 | Stop reason: HOSPADM

## 2018-01-01 RX ORDER — LISINOPRIL 5 MG/1
5 TABLET ORAL
Status: DISCONTINUED | OUTPATIENT
Start: 2018-01-01 | End: 2018-01-01

## 2018-01-01 RX ORDER — SODIUM CHLORIDE 0.9 % (FLUSH) 0.9 %
3 SYRINGE (ML) INJECTION EVERY 12 HOURS SCHEDULED
Status: DISCONTINUED | OUTPATIENT
Start: 2018-01-01 | End: 2018-01-01 | Stop reason: HOSPADM

## 2018-01-01 RX ORDER — PHENAZOPYRIDINE HYDROCHLORIDE 100 MG/1
100 TABLET, FILM COATED ORAL ONCE
Status: COMPLETED | OUTPATIENT
Start: 2018-01-01 | End: 2018-01-01

## 2018-01-01 RX ORDER — CEFUROXIME AXETIL 500 MG/1
500 TABLET ORAL 2 TIMES DAILY
Qty: 8 TABLET | Refills: 0 | Status: SHIPPED | OUTPATIENT
Start: 2018-01-01 | End: 2018-01-01

## 2018-01-01 RX ORDER — ATORVASTATIN CALCIUM 40 MG/1
40 TABLET, FILM COATED ORAL NIGHTLY
Qty: 30 TABLET | Refills: 0
Start: 2018-01-01

## 2018-01-01 RX ORDER — FLUOXETINE HYDROCHLORIDE 20 MG/1
20 CAPSULE ORAL DAILY
Status: DISCONTINUED | OUTPATIENT
Start: 2018-01-01 | End: 2018-01-01 | Stop reason: HOSPADM

## 2018-01-01 RX ORDER — LANOLIN ALCOHOL/MO/W.PET/CERES
1000 CREAM (GRAM) TOPICAL DAILY
Status: DISCONTINUED | OUTPATIENT
Start: 2018-01-01 | End: 2018-01-01 | Stop reason: HOSPADM

## 2018-01-01 RX ORDER — SODIUM CHLORIDE 9 MG/ML
75 INJECTION, SOLUTION INTRAVENOUS ONCE
Status: COMPLETED | OUTPATIENT
Start: 2018-01-01 | End: 2018-01-01

## 2018-01-01 RX ORDER — MULTIPLE VITAMINS W/ MINERALS TAB 9MG-400MCG
1 TAB ORAL DAILY
Status: DISCONTINUED | OUTPATIENT
Start: 2018-01-01 | End: 2018-01-01 | Stop reason: HOSPADM

## 2018-01-01 RX ORDER — SODIUM CHLORIDE 0.9 % (FLUSH) 0.9 %
3-10 SYRINGE (ML) INJECTION AS NEEDED
Status: DISCONTINUED | OUTPATIENT
Start: 2018-01-01 | End: 2018-01-01 | Stop reason: HOSPADM

## 2018-01-01 RX ORDER — ONDANSETRON 4 MG/1
4 TABLET, ORALLY DISINTEGRATING ORAL ONCE
Status: COMPLETED | OUTPATIENT
Start: 2018-01-01 | End: 2018-01-01

## 2018-01-01 RX ORDER — AMOXICILLIN AND CLAVULANATE POTASSIUM 875; 125 MG/1; MG/1
1 TABLET, FILM COATED ORAL 2 TIMES DAILY
Qty: 14 TABLET | Refills: 0 | Status: SHIPPED | OUTPATIENT
Start: 2018-01-01 | End: 2018-01-01

## 2018-01-01 RX ORDER — ACETAMINOPHEN 325 MG/1
650 TABLET ORAL EVERY 4 HOURS PRN
Qty: 1 BOTTLE | Refills: 0
Start: 2018-01-01

## 2018-01-01 RX ORDER — LANOLIN ALCOHOL/MO/W.PET/CERES
1000 CREAM (GRAM) TOPICAL DAILY
COMMUNITY

## 2018-01-01 RX ORDER — ATORVASTATIN CALCIUM 40 MG/1
80 TABLET, FILM COATED ORAL NIGHTLY
Status: DISCONTINUED | OUTPATIENT
Start: 2018-01-01 | End: 2018-01-01

## 2018-01-01 RX ORDER — ACETAMINOPHEN 650 MG/1
650 SUPPOSITORY RECTAL EVERY 4 HOURS PRN
Status: DISCONTINUED | OUTPATIENT
Start: 2018-01-01 | End: 2018-01-01 | Stop reason: HOSPADM

## 2018-01-01 RX ORDER — DEXTROSE MONOHYDRATE 25 G/50ML
25 INJECTION, SOLUTION INTRAVENOUS
Status: DISCONTINUED | OUTPATIENT
Start: 2018-01-01 | End: 2018-01-01 | Stop reason: SDUPTHER

## 2018-01-01 RX ORDER — DEXTROSE MONOHYDRATE 25 G/50ML
25 INJECTION, SOLUTION INTRAVENOUS
Status: DISCONTINUED | OUTPATIENT
Start: 2018-01-01 | End: 2018-01-01 | Stop reason: HOSPADM

## 2018-01-01 RX ORDER — LISINOPRIL 10 MG/1
10 TABLET ORAL
Status: DISCONTINUED | OUTPATIENT
Start: 2018-01-01 | End: 2018-01-01 | Stop reason: HOSPADM

## 2018-01-01 RX ORDER — MAGNESIUM SULFATE HEPTAHYDRATE 40 MG/ML
4 INJECTION, SOLUTION INTRAVENOUS AS NEEDED
Status: DISCONTINUED | OUTPATIENT
Start: 2018-01-01 | End: 2018-01-01 | Stop reason: HOSPADM

## 2018-01-01 RX ORDER — ATORVASTATIN CALCIUM 40 MG/1
40 TABLET, FILM COATED ORAL NIGHTLY
Status: DISCONTINUED | OUTPATIENT
Start: 2018-01-01 | End: 2018-01-01 | Stop reason: HOSPADM

## 2018-01-01 RX ORDER — ERYTHROMYCIN 5 MG/G
OINTMENT OPHTHALMIC
Qty: 3.5 G | Refills: 0 | Status: SHIPPED | OUTPATIENT
Start: 2018-01-01 | End: 2018-01-01

## 2018-01-01 RX ORDER — POTASSIUM CHLORIDE 750 MG/1
40 CAPSULE, EXTENDED RELEASE ORAL ONCE
Status: COMPLETED | OUTPATIENT
Start: 2018-01-01 | End: 2018-01-01

## 2018-01-01 RX ORDER — NICOTINE POLACRILEX 4 MG
15 LOZENGE BUCCAL
Status: DISCONTINUED | OUTPATIENT
Start: 2018-01-01 | End: 2018-01-01 | Stop reason: SDUPTHER

## 2018-01-01 RX ORDER — ASPIRIN 300 MG/1
300 SUPPOSITORY RECTAL DAILY
Status: DISCONTINUED | OUTPATIENT
Start: 2018-01-01 | End: 2018-01-01 | Stop reason: HOSPADM

## 2018-01-01 RX ORDER — MELATONIN
1000 DAILY
COMMUNITY

## 2018-01-01 RX ORDER — ASPIRIN 325 MG
325 TABLET ORAL DAILY
Status: DISCONTINUED | OUTPATIENT
Start: 2018-01-01 | End: 2018-01-01 | Stop reason: HOSPADM

## 2018-01-01 RX ORDER — POTASSIUM CHLORIDE 7.45 MG/ML
10 INJECTION INTRAVENOUS
Status: DISCONTINUED | OUTPATIENT
Start: 2018-01-01 | End: 2018-01-01 | Stop reason: HOSPADM

## 2018-01-01 RX ORDER — POTASSIUM CHLORIDE 750 MG/1
40 CAPSULE, EXTENDED RELEASE ORAL AS NEEDED
Status: DISCONTINUED | OUTPATIENT
Start: 2018-01-01 | End: 2018-01-01 | Stop reason: HOSPADM

## 2018-01-01 RX ORDER — CEFTRIAXONE SODIUM 1 G/50ML
1 INJECTION, SOLUTION INTRAVENOUS ONCE
Status: COMPLETED | OUTPATIENT
Start: 2018-01-01 | End: 2018-01-01

## 2018-01-01 RX ORDER — POTASSIUM CHLORIDE 750 MG/1
10 TABLET, FILM COATED, EXTENDED RELEASE ORAL DAILY
Qty: 7 TABLET | Refills: 0 | Status: SHIPPED | OUTPATIENT
Start: 2018-01-01

## 2018-01-01 RX ORDER — CEFTRIAXONE SODIUM 1 G/50ML
1 INJECTION, SOLUTION INTRAVENOUS EVERY 24 HOURS
Status: DISCONTINUED | OUTPATIENT
Start: 2018-01-01 | End: 2018-01-01 | Stop reason: HOSPADM

## 2018-01-01 RX ADMIN — LORAZEPAM 0.5 MG: 2 INJECTION INTRAMUSCULAR; INTRAVENOUS at 19:57

## 2018-01-01 RX ADMIN — PHENAZOPYRIDINE HYDROCHLORIDE 100 MG: 100 TABLET ORAL at 19:38

## 2018-01-01 RX ADMIN — SUCRALFATE 1 G: 1 TABLET ORAL at 11:00

## 2018-01-01 RX ADMIN — SUCRALFATE 1 G: 1 TABLET ORAL at 16:49

## 2018-01-01 RX ADMIN — SUCRALFATE 1 G: 1 TABLET ORAL at 14:09

## 2018-01-01 RX ADMIN — INSULIN LISPRO 2 UNITS: 100 INJECTION, SOLUTION INTRAVENOUS; SUBCUTANEOUS at 21:26

## 2018-01-01 RX ADMIN — MULTIPLE VITAMINS W/ MINERALS TAB 1 TABLET: TAB ORAL at 14:10

## 2018-01-01 RX ADMIN — CYANOCOBALAMIN TAB 1000 MCG 1000 MCG: 1000 TAB at 09:40

## 2018-01-01 RX ADMIN — FLUOXETINE HYDROCHLORIDE 20 MG: 20 CAPSULE ORAL at 09:40

## 2018-01-01 RX ADMIN — ASPIRIN 325 MG ORAL TABLET 325 MG: 325 PILL ORAL at 11:01

## 2018-01-01 RX ADMIN — INSULIN LISPRO 2 UNITS: 100 INJECTION, SOLUTION INTRAVENOUS; SUBCUTANEOUS at 20:48

## 2018-01-01 RX ADMIN — Medication 3 ML: at 10:04

## 2018-01-01 RX ADMIN — Medication 3 ML: at 22:18

## 2018-01-01 RX ADMIN — CYANOCOBALAMIN TAB 1000 MCG 1000 MCG: 1000 TAB at 11:02

## 2018-01-01 RX ADMIN — CEFTRIAXONE SODIUM 1 G: 1 INJECTION, SOLUTION INTRAVENOUS at 21:43

## 2018-01-01 RX ADMIN — Medication 3 ML: at 09:39

## 2018-01-01 RX ADMIN — INSULIN LISPRO 2 UNITS: 100 INJECTION, SOLUTION INTRAVENOUS; SUBCUTANEOUS at 14:20

## 2018-01-01 RX ADMIN — Medication 3 ML: at 20:47

## 2018-01-01 RX ADMIN — SUCRALFATE 1 G: 1 TABLET ORAL at 21:25

## 2018-01-01 RX ADMIN — SODIUM CHLORIDE 500 ML: 9 INJECTION, SOLUTION INTRAVENOUS at 03:19

## 2018-01-01 RX ADMIN — Medication 3 ML: at 08:51

## 2018-01-01 RX ADMIN — LISINOPRIL 10 MG: 10 TABLET ORAL at 09:12

## 2018-01-01 RX ADMIN — FLUOXETINE HYDROCHLORIDE 20 MG: 20 CAPSULE ORAL at 09:11

## 2018-01-01 RX ADMIN — MULTIPLE VITAMINS W/ MINERALS TAB 1 TABLET: TAB ORAL at 09:11

## 2018-01-01 RX ADMIN — SUCRALFATE 1 G: 1 TABLET ORAL at 13:03

## 2018-01-01 RX ADMIN — Medication 3 ML: at 11:12

## 2018-01-01 RX ADMIN — ATORVASTATIN CALCIUM 40 MG: 40 TABLET, FILM COATED ORAL at 20:47

## 2018-01-01 RX ADMIN — Medication 3 ML: at 11:08

## 2018-01-01 RX ADMIN — INSULIN LISPRO 2 UNITS: 100 INJECTION, SOLUTION INTRAVENOUS; SUBCUTANEOUS at 21:44

## 2018-01-01 RX ADMIN — INSULIN LISPRO 2 UNITS: 100 INJECTION, SOLUTION INTRAVENOUS; SUBCUTANEOUS at 13:02

## 2018-01-01 RX ADMIN — INSULIN LISPRO 2 UNITS: 100 INJECTION, SOLUTION INTRAVENOUS; SUBCUTANEOUS at 18:55

## 2018-01-01 RX ADMIN — FLUOXETINE HYDROCHLORIDE 20 MG: 20 CAPSULE ORAL at 11:02

## 2018-01-01 RX ADMIN — DONEPEZIL HYDROCHLORIDE 5 MG: 10 TABLET, FILM COATED ORAL at 11:02

## 2018-01-01 RX ADMIN — SODIUM CHLORIDE 1000 ML: 9 INJECTION, SOLUTION INTRAVENOUS at 19:01

## 2018-01-01 RX ADMIN — SUCRALFATE 1 G: 1 TABLET ORAL at 17:29

## 2018-01-01 RX ADMIN — Medication 3 ML: at 21:25

## 2018-01-01 RX ADMIN — POTASSIUM CHLORIDE 40 MEQ: 750 CAPSULE, EXTENDED RELEASE ORAL at 20:36

## 2018-01-01 RX ADMIN — CEFTRIAXONE SODIUM 1 G: 1 INJECTION, SOLUTION INTRAVENOUS at 19:37

## 2018-01-01 RX ADMIN — ASPIRIN 325 MG ORAL TABLET 325 MG: 325 PILL ORAL at 09:12

## 2018-01-01 RX ADMIN — ATORVASTATIN CALCIUM 40 MG: 40 TABLET, FILM COATED ORAL at 21:25

## 2018-01-01 RX ADMIN — QUETIAPINE FUMARATE 25 MG: 25 TABLET ORAL at 21:30

## 2018-01-01 RX ADMIN — LISINOPRIL 5 MG: 5 TABLET ORAL at 11:03

## 2018-01-01 RX ADMIN — CYANOCOBALAMIN TAB 1000 MCG 1000 MCG: 1000 TAB at 09:12

## 2018-01-01 RX ADMIN — INSULIN LISPRO 2 UNITS: 100 INJECTION, SOLUTION INTRAVENOUS; SUBCUTANEOUS at 12:58

## 2018-01-01 RX ADMIN — CEFTRIAXONE SODIUM 1 G: 1 INJECTION, SOLUTION INTRAVENOUS at 21:25

## 2018-01-01 RX ADMIN — SUCRALFATE 1 G: 1 TABLET ORAL at 05:45

## 2018-01-01 RX ADMIN — DONEPEZIL HYDROCHLORIDE 5 MG: 10 TABLET, FILM COATED ORAL at 09:12

## 2018-01-01 RX ADMIN — ATORVASTATIN CALCIUM 40 MG: 40 TABLET, FILM COATED ORAL at 21:43

## 2018-01-01 RX ADMIN — MULTIPLE VITAMINS W/ MINERALS TAB 1 TABLET: TAB ORAL at 09:39

## 2018-01-01 RX ADMIN — LISINOPRIL 5 MG: 5 TABLET ORAL at 09:46

## 2018-01-01 RX ADMIN — MULTIPLE VITAMINS W/ MINERALS TAB 1 TABLET: TAB ORAL at 11:01

## 2018-01-01 RX ADMIN — SUCRALFATE 1 G: 1 TABLET ORAL at 20:47

## 2018-01-01 RX ADMIN — SUCRALFATE 1 G: 1 TABLET ORAL at 06:37

## 2018-01-01 RX ADMIN — CYANOCOBALAMIN TAB 1000 MCG 1000 MCG: 1000 TAB at 14:09

## 2018-01-01 RX ADMIN — SUCRALFATE 1 G: 1 TABLET ORAL at 21:30

## 2018-01-01 RX ADMIN — Medication 3 ML: at 21:26

## 2018-01-01 RX ADMIN — OXYMETAZOLINE HYDROCHLORIDE 1 SPRAY: 5 SPRAY NASAL at 10:37

## 2018-01-01 RX ADMIN — Medication 3 ML: at 11:07

## 2018-01-01 RX ADMIN — Medication 3 ML: at 09:38

## 2018-01-01 RX ADMIN — SUCRALFATE 1 G: 1 TABLET ORAL at 09:12

## 2018-01-01 RX ADMIN — Medication 3 ML: at 21:30

## 2018-01-01 RX ADMIN — SUCRALFATE 1 G: 1 TABLET ORAL at 18:54

## 2018-01-01 RX ADMIN — SODIUM CHLORIDE 75 ML/HR: 9 INJECTION, SOLUTION INTRAVENOUS at 06:24

## 2018-01-01 RX ADMIN — DONEPEZIL HYDROCHLORIDE 5 MG: 10 TABLET, FILM COATED ORAL at 14:10

## 2018-01-01 RX ADMIN — DONEPEZIL HYDROCHLORIDE 5 MG: 10 TABLET, FILM COATED ORAL at 09:40

## 2018-01-01 RX ADMIN — ONDANSETRON 4 MG: 4 TABLET, ORALLY DISINTEGRATING ORAL at 23:32

## 2018-01-01 RX ADMIN — ASPIRIN 325 MG ORAL TABLET 325 MG: 325 PILL ORAL at 14:10

## 2018-01-01 RX ADMIN — ASPIRIN 325 MG ORAL TABLET 325 MG: 325 PILL ORAL at 09:39

## 2018-01-01 RX ADMIN — CEFTRIAXONE SODIUM 1 G: 1 INJECTION, SOLUTION INTRAVENOUS at 20:47

## 2018-01-01 RX ADMIN — ACETAMINOPHEN 650 MG: 325 TABLET ORAL at 20:47

## 2018-05-02 NOTE — ED PROVIDER NOTES
Subjective   Ms. Val Hill is a 87 y.o. female who presents to the ED with c/o nosebleed and syncope. EMS were dispatched this morning for a nosebleed that would not stop with at 0630 this morning. While seating with EMS present, she had a witnessed syncopal episode for about 20 seconds. She is usually alert and oriented but now is confused and altered. EMS has placed a nasal clamp to help control her nosebleed, and they also note she is on blood thinning medication. Pt herself does not remember having the syncopal episode, and is not in any pain whatsoever at this time. She has no dizziness, light-headedness, N/V, SoA, or any other acute sx at this time. She denies any past medical history of cardiovascular disease, or brain disorders.     Son, who arrived shortly after, notes that he was called this morning at 0500 for a nosebleed. It was stopped once, but restarted shortly after and EMS was called. While sitting on the toilet, she had passed out while EMS was attempting to stop the nosebleed. He does note that she complained of a left sided headache shortly after. Otherwise, at baseline, she is able to ambulate with a walker and lives at home with someone coming to take care of her daily for 3 hours a day.         History provided by:  Patient and EMS personnel  Syncope   Episode history:  Single  Most recent episode:  Today  Duration:  20 seconds  Timing:  Constant  Progression:  Unable to specify  Chronicity:  New  Context: sitting down    Witnessed: yes    Relieved by:  None tried  Worsened by:  Nothing  Ineffective treatments:  None tried  Associated symptoms: confusion and headaches    Associated symptoms: no chest pain, no difficulty breathing, no dizziness, no nausea, no shortness of breath and no vomiting        Review of Systems   HENT: Positive for nosebleeds.    Respiratory: Negative for shortness of breath.    Cardiovascular: Positive for syncope. Negative for chest pain.   Gastrointestinal:  Negative for abdominal pain, nausea and vomiting.   Neurological: Positive for syncope and headaches. Negative for dizziness.   Psychiatric/Behavioral: Positive for confusion.   All other systems reviewed and are negative.      Past Medical History:   Diagnosis Date   • Dementia    • Diabetes mellitus    • Hyperlipidemia        No Known Allergies    Past Surgical History:   Procedure Laterality Date   • ELBOW PROCEDURE     • HYSTERECTOMY         History reviewed. No pertinent family history.    Social History     Social History   • Marital status:      Social History Main Topics   • Smoking status: Former Smoker   • Alcohol use No   • Drug use: No   • Sexual activity: Defer     Other Topics Concern   • Not on file         Objective   Physical Exam   Constitutional: She is oriented to person, place, and time. She appears well-developed and well-nourished. No distress.   HENT:   Head: Normocephalic and atraumatic.   Nose: Nose normal.   Right nares with blood with no active bleeding. Left nares is normal. No blood in pharynx.    Eyes: Conjunctivae are normal. No scleral icterus.   Neck: Normal range of motion and phonation normal. Neck supple.   Pulmonary/Chest: Effort normal. No respiratory distress. She has rales (faint, fine, rales at both bases).   Abdominal: Soft. There is no tenderness.   Musculoskeletal: She exhibits edema (trace pretibial edema).   Neurological: She is alert and oriented to person, place, and time.   Skin: Skin is warm and dry.   Psychiatric: She has a normal mood and affect. Her behavior is normal.   Nursing note and vitals reviewed.      Procedures         ED Course  ED Course   Comment By Time   Dr. Buitrago is at the bedside reevaluating the patient. He has dicussed current findings and plan of care. Will discharge at this time. Pt and family comfortable with plan. Bhakti Colbert 05/02 0957   She has had no further nosebleeds. Lucien Buitrago MD 05/02 1002     No results found for this or  "any previous visit (from the past 24 hour(s)).  Note: In addition to lab results from this visit, the labs listed above may include labs taken at another facility or during a different encounter within the last 24 hours. Please correlate lab times with ED admission and discharge times for further clarification of the services performed during this visit.    No orders to display     Vitals:    05/02/18 0749 05/02/18 0750 05/02/18 1000   BP:  129/71 142/80   Patient Position:  Lying    Pulse:  69 68   Resp:  14    Temp:  97.8 °F (36.6 °C)    TempSrc:  Oral    SpO2:  94% 100%   Weight: 61.2 kg (135 lb)     Height: 170.2 cm (67\")       Medications   oxymetazoline (AFRIN) nasal spray 1 spray (1 spray Right Nare Given 5/2/18 1037)     ECG/EMG Results (last 24 hours)     Procedure Component Value Units Date/Time    ECG 12 Lead [[id number]] Collected:  05/02/18 0749     Updated:  05/02/18 0757    Narrative:       Test Reason : syncope  Blood Pressure : **/** mmHG  Vent. Rate : 067 BPM     Atrial Rate : 067 BPM     P-R Int : 178 ms          QRS Dur : 076 ms      QT Int : 410 ms       P-R-T Axes : 049 -25 050 degrees     QTc Int : 433 ms    Normal sinus rhythm  When compared with ECG of 19-NOV-2016 01:34,  No significant change was found  Confirmed by LUCIEN BUITRAGO MD ([id number]) on 5/2/2018 7:57:46 AM    Referred By:  RACHEL           Confirmed By:LUCIEN BUITRAGO MD                          [facility]    Final diagnoses:   Vasovagal syncope   Epistaxis       Documentation assistance provided by radha TEJADA.  Information recorded by the radha was done at my direction and has been verified and validated by me.     Bhakti Tejada  05/02/18 0754       Bhakti Tejada  05/02/18 0823       Lucien Buitrago MD  05/04/18 7800    "

## 2018-05-02 NOTE — DISCHARGE INSTRUCTIONS
Use the oxymetazoline spray, once in right nostril three times daily for three days.  If recurrent nosebleed, use the clamp for 10-15 minutes.  If uncontrollable bleeding return to the ER.  Return to the ER if any further worrisome problems.

## 2018-11-16 PROBLEM — I63.9 STROKE (HCC): Status: ACTIVE | Noted: 2018-01-01

## 2018-11-16 NOTE — ED PROVIDER NOTES
Subjective   Val Hill is a 87 y.o. female who presents to the emergency department with complaints of altered mental status per son that started today. Son mentions that the patient was fine last night, but today she usually facetimes her daughter but could not figure out how to turn the facetime on the TV. The patient called him and sounded confused so he went over to her house and found her kneeling beside her bed not knowing why where she had an episode of bladder incontinence. Today the son reports that the patient was confused about the remote to the TV, and did not know how to work it. The patient denies any fever, vomiting, cough, SOA, and any other acute symptoms at this time.         History provided by:  Relative  History limited by:  Dementia  Altered Mental Status   Presenting symptoms: confusion    Severity:  Severe  Most recent episode:  Today  Episode history:  Single  Timing:  Constant  Progression:  Unchanged  Chronicity:  New  Context: dementia    Associated symptoms: bladder incontinence    Associated symptoms: no abdominal pain, no fever, no nausea and no vomiting        Review of Systems   Constitutional: Negative for fever.   Respiratory: Negative for cough.    Gastrointestinal: Negative for abdominal pain, nausea and vomiting.   Genitourinary: Positive for bladder incontinence.   Psychiatric/Behavioral: Positive for confusion.   All other systems reviewed and are negative.      Past Medical History:   Diagnosis Date   • Dementia    • Diabetes mellitus (CMS/HCC)    • Hyperlipidemia        No Known Allergies    Past Surgical History:   Procedure Laterality Date   • ELBOW PROCEDURE     • HYSTERECTOMY         History reviewed. No pertinent family history.    Social History     Socioeconomic History   • Marital status:      Spouse name: Not on file   • Number of children: Not on file   • Years of education: Not on file   • Highest education level: Not on file   Tobacco Use   • Smoking  status: Former Smoker   Substance and Sexual Activity   • Alcohol use: No   • Drug use: No   • Sexual activity: Defer         Objective   Physical Exam   Constitutional: She is oriented to person, place, and time. She appears well-developed and well-nourished. No distress.   HENT:   Head: Normocephalic and atraumatic.   Eyes: Conjunctivae are normal. No scleral icterus.   Neck: Normal range of motion. Neck supple.   Cardiovascular: Normal rate, regular rhythm, normal heart sounds and intact distal pulses. Exam reveals no gallop and no friction rub.   No murmur heard.  Pulmonary/Chest: Effort normal and breath sounds normal. No respiratory distress. She has no wheezes. She has no rales.   Abdominal: Soft. Bowel sounds are normal. There is no tenderness. There is no guarding.   Musculoskeletal: Normal range of motion.   Neurological: She is alert and oriented to person, place, and time.   The patient is able to answer questions about immediate well being but not able to provide information about history.    Skin: Skin is warm and dry. She is not diaphoretic.   Psychiatric: She has a normal mood and affect. Her behavior is normal.   Nursing note and vitals reviewed.      Procedures         ED Course  ED Course as of Nov 16 2121   Fri Nov 16, 2018 1953 Dr. aPvon discussed CT with the radiologist at this time.   [CN]      ED Course User Index  [CN] Clarita Larson     Recent Results (from the past 24 hour(s))   Urinalysis With Microscopic If Indicated (No Culture) - Urine, Clean Catch    Collection Time: 11/16/18  4:54 PM   Result Value Ref Range    Color, UA Dark Yellow (A) Yellow, Straw    Appearance, UA Turbid (A) Clear    pH, UA 6.5 5.0 - 8.0    Specific Gravity, UA 1.015 1.001 - 1.030    Glucose,  mg/dL (1+) (A) Negative    Ketones, UA Negative Negative    Bilirubin, UA Negative Negative    Blood, UA Small (1+) (A) Negative    Protein, UA 30 mg/dL (1+) (A) Negative    Leuk Esterase, UA Large (3+) (A)  Negative    Nitrite, UA Positive (A) Negative    Urobilinogen, UA 0.2 E.U./dL 0.2 - 1.0 E.U./dL   Urinalysis, Microscopic Only - Urine, Clean Catch    Collection Time: 11/16/18  4:54 PM   Result Value Ref Range    RBC, UA 3-6 (A) None Seen, 0-2 /HPF    WBC, UA Too Numerous to Count (A) None Seen, 0-2 /HPF    Bacteria, UA 4+ (A) None Seen, Trace /HPF    Squamous Epithelial Cells, UA None Seen None Seen, 0-2 /HPF    Yeast, UA Small/1+ Yeast None Seen /HPF    Hyaline Casts, UA 31-50 0 - 6 /LPF    Methodology Manual Light Microscopy    Comprehensive Metabolic Panel    Collection Time: 11/16/18  5:08 PM   Result Value Ref Range    Glucose 120 (H) 70 - 100 mg/dL    BUN 13 9 - 23 mg/dL    Creatinine 0.71 0.60 - 1.30 mg/dL    Sodium 139 132 - 146 mmol/L    Potassium 3.9 3.5 - 5.5 mmol/L    Chloride 103 99 - 109 mmol/L    CO2 27.0 20.0 - 31.0 mmol/L    Calcium 10.2 8.7 - 10.4 mg/dL    Total Protein 7.1 5.7 - 8.2 g/dL    Albumin 4.30 3.20 - 4.80 g/dL    ALT (SGPT) 13 7 - 40 U/L    AST (SGOT) 15 0 - 33 U/L    Alkaline Phosphatase 66 25 - 100 U/L    Total Bilirubin 0.2 (L) 0.3 - 1.2 mg/dL    eGFR Non African Amer 78 >60 mL/min/1.73    Globulin 2.8 gm/dL    A/G Ratio 1.5 1.5 - 2.5 g/dL    BUN/Creatinine Ratio 18.3 7.0 - 25.0    Anion Gap 9.0 3.0 - 11.0 mmol/L   Magnesium    Collection Time: 11/16/18  5:08 PM   Result Value Ref Range    Magnesium 1.8 1.3 - 2.7 mg/dL   Light Blue Top    Collection Time: 11/16/18  5:08 PM   Result Value Ref Range    Extra Tube hold for add-on    Green Top (Gel)    Collection Time: 11/16/18  5:08 PM   Result Value Ref Range    Extra Tube Hold for add-ons.    Lavender Top    Collection Time: 11/16/18  5:08 PM   Result Value Ref Range    Extra Tube hold for add-on    Gold Top - SST    Collection Time: 11/16/18  5:08 PM   Result Value Ref Range    Extra Tube Hold for add-ons.    CBC Auto Differential    Collection Time: 11/16/18  5:08 PM   Result Value Ref Range    WBC 8.43 3.50 - 10.80 10*3/mm3    RBC  4.23 3.89 - 5.14 10*6/mm3    Hemoglobin 12.7 11.5 - 15.5 g/dL    Hematocrit 39.6 34.5 - 44.0 %    MCV 93.6 80.0 - 99.0 fL    MCH 30.0 27.0 - 31.0 pg    MCHC 32.1 32.0 - 36.0 g/dL    RDW 13.5 11.3 - 14.5 %    RDW-SD 46.5 37.0 - 54.0 fl    MPV 9.9 6.0 - 12.0 fL    Platelets 346 150 - 450 10*3/mm3    Neutrophil % 53.4 41.0 - 71.0 %    Lymphocyte % 35.5 24.0 - 44.0 %    Monocyte % 8.9 0.0 - 12.0 %    Eosinophil % 2.1 0.0 - 3.0 %    Basophil % 0.1 0.0 - 1.0 %    Immature Grans % 0.1 0.0 - 0.6 %    Neutrophils, Absolute 4.50 1.50 - 8.30 10*3/mm3    Lymphocytes, Absolute 2.99 0.60 - 4.80 10*3/mm3    Monocytes, Absolute 0.75 0.00 - 1.00 10*3/mm3    Eosinophils, Absolute 0.18 0.00 - 0.30 10*3/mm3    Basophils, Absolute 0.01 0.00 - 0.20 10*3/mm3    Immature Grans, Absolute 0.01 0.00 - 0.03 10*3/mm3   POC Troponin, Rapid    Collection Time: 11/16/18  5:15 PM   Result Value Ref Range    Troponin I 0.00 0.00 - 0.07 ng/mL     Note: In addition to lab results from this visit, the labs listed above may include labs taken at another facility or during a different encounter within the last 24 hours. Please correlate lab times with ED admission and discharge times for further clarification of the services performed during this visit.    CT Head Without Contrast   Final Result   Addendum 1 of 1   THIS REPORT CONTAINS FINDINGS THAT MAY BE CRITICAL TO PATIENT CARE. The    findings were verbally communicated via telephone conference with SAMEERA ROCHE    at 7:54 PM EST on 11/16/2018. The findings were acknowledged and    understood.      THIS DOCUMENT HAS BEEN ELECTRONICALLY SIGNED BY ZARI WHITAKER MD      Final   Cerebral atrophy, chronic small vessel ischemic disease.       Likely superimposed acute ischemia noted in the left superior basal ganglia      THIS DOCUMENT HAS BEEN ELECTRONICALLY SIGNED BY ZARI WHITAKER MD      XR Chest 1 View   Preliminary Result   Chronic interstitial changes seen throughout the lung fields  "  bilaterally. No superimposed infectious process.       DICTATED:   11/16/2018   EDITED/ls :   11/16/2018           MRI Brain Without Contrast    (Results Pending)     Vitals:    11/16/18 1650 11/16/18 1730   BP: (!) 190/91 168/87   Pulse: 77 73   Resp: 16    Temp: 98.4 °F (36.9 °C)    TempSrc: Oral    SpO2: 96% 95%   Weight: 60 kg (132 lb 4.4 oz)    Height: 170.2 cm (67\")      Medications   sodium chloride 0.9 % flush 10 mL (not administered)   cefTRIAXone (ROCEPHIN) IVPB 1 g (0 g Intravenous Stopped 11/16/18 2013)   phenazopyridine (PYRIDIUM) tablet 100 mg (100 mg Oral Given 11/16/18 1938)     ECG/EMG Results (last 24 hours)     Procedure Component Value Units Date/Time    ECG 12 Lead [[id number]] Collected:  11/16/18 1659     Updated:  11/16/18 1659                        MDM  Number of Diagnoses or Management Options  Bacterial UTI:   Cerebral ischemia: new and requires workup  Encephalopathy acute: new and requires workup     Amount and/or Complexity of Data Reviewed  Clinical lab tests: ordered and reviewed  Tests in the radiology section of CPT®: ordered and reviewed  Obtain history from someone other than the patient: yes  Discuss the patient with other providers: yes  Independent visualization of images, tracings, or specimens: yes    Patient Progress  Patient progress: improved      Final diagnoses:   Encephalopathy acute   Bacterial UTI   Cerebral ischemia       Documentation assistance provided by radha Larson.  Information recorded by the scribe was done at my direction and has been verified and validated by me.     Clarita Larson  11/16/18 1837       Jass Pavon MD  11/16/18 2117       Jass Pavon MD  11/16/18 2121    "

## 2018-11-17 PROBLEM — F03.90 DEMENTIA (HCC): Status: ACTIVE | Noted: 2018-01-01

## 2018-11-17 PROBLEM — E11.9 TYPE 2 DIABETES MELLITUS (HCC): Status: ACTIVE | Noted: 2018-01-01

## 2018-11-17 PROBLEM — N39.0 UTI (URINARY TRACT INFECTION): Status: ACTIVE | Noted: 2018-01-01

## 2018-11-17 PROBLEM — G93.41 METABOLIC ENCEPHALOPATHY: Status: ACTIVE | Noted: 2018-01-01

## 2018-11-17 PROBLEM — D72.829 LEUKOCYTOSIS: Status: ACTIVE | Noted: 2018-01-01

## 2018-11-17 NOTE — CONSULTS
"Subjective     CC: encephalopathy    History of Present Illness   Val Hill is a 87 y.o. female is seen today in consultation for encephalopathy. She is unable to provide any history, and so the history is obtained from review of her medical record. Briefly, she was found confused and incontinent of urine by her son yesterday, without apparent focal neurologic signs. She was brought to the ED and found to have evidence for UTI and started on ceftriaxone. No additional history is available at this time. Ms. Hill currently denies any symptoms, but is clearly encephalopathic.    I have reviewed and confirmed the past family, social and medical history as accurate on 11/17/18.     Review of Systems   Constitutional: Negative.    Respiratory: Negative.    Cardiovascular: Negative.    Gastrointestinal: Negative.    Genitourinary: Negative.    All other systems reviewed and are negative.      Objective   General appearance today is normal.   Peripheral pulses were present and symmetric.   The ophthalmoscopic exam today is unremarkable. This discs and posterior elements are unremarkable.    /65 (BP Location: Left arm, Patient Position: Lying)   Pulse 80   Temp 97.7 °F (36.5 °C) (Oral)   Resp 16   Ht 170.2 cm (67\")   Wt 60 kg (132 lb 4.4 oz)   SpO2 94%   BMI 20.72 kg/m²     Physical Exam   Neurological: She has normal strength. She has a normal Finger-Nose-Finger Test.   Reflex Scores:       Tricep reflexes are 1+ on the right side and 1+ on the left side.       Bicep reflexes are 1+ on the right side and 1+ on the left side.       Brachioradialis reflexes are 1+ on the right side and 1+ on the left side.       Patellar reflexes are 1+ on the right side and 1+ on the left side.       Achilles reflexes are 1+ on the right side and 1+ on the left side.  Psychiatric: Her speech is normal.        Neurologic Exam     Mental Status   Oriented to person.   Disoriented to place.   Disoriented to time.   Recall " of objects at 5 minutes: 0/3. Follows 1 step commands.   Attention: decreased.   Speech: speech is normal   Level of consciousness: drowsy  Knowledge: poor.   Normal comprehension.     Cranial Nerves   Cranial nerves II through XII intact.     Motor Exam   Muscle bulk: normal  Overall muscle tone: normal    Strength   Strength 5/5 throughout.     Sensory Exam   Light touch normal.     Gait, Coordination, and Reflexes     Gait  Gait: (not tested due to encephalopathy)    Coordination   Finger to nose coordination: normal    Reflexes   Right brachioradialis: 1+  Left brachioradialis: 1+  Right biceps: 1+  Left biceps: 1+  Right triceps: 1+  Left triceps: 1+  Right patellar: 1+  Left patellar: 1+  Right achilles: 1+  Left achilles: 1+  Right plantar: equivocal  Left plantar: equivocal      Laboratory and radiological testing: normal BMP/Troponin/TSH. WBC=22.11. 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       Assessment/Plan     Val Hill is admitted with encephalopathy. Her presentation is almost certainly due to the effects of her UTI superimposed on dementia, and the finding of stroke is likely incidental. Nevertheless, I think that her planned evaluation and current treatment are reasonable. I primarily recommend continued treatment of her UTI and continuation on donepezil. I'll check a B12 to rule out any other contributory causes for her encephalopathy.    As part of this visit I reviewed prior lab results, reviewed radiology results, reviewed radiology images and reviewed records from the current hospitalization which is incorporated in the HPI. Please see above for details.

## 2018-11-17 NOTE — PLAN OF CARE
Problem: Patient Care Overview  Goal: Plan of Care Review  Outcome: Ongoing (interventions implemented as appropriate)   11/17/18 1030   Coping/Psychosocial   Plan of Care Reviewed With patient   Plan of Care Review   Progress no change   OTHER   Outcome Summary OT IE complete. Difficult to assess PLOF due to pt confusion/cognitive status. Required max A bed mobility. Pt abruptly laid back down despite cue/encouragement for STS tsf attempt. Max assist/cue for grooming d/t decreased participation/effort. Would benefit from skilled OT to address BADL, tsf and fnxl mob deficits to progress toward PLOF. Recommend d/c to SNF.

## 2018-11-17 NOTE — PLAN OF CARE
Problem: Patient Care Overview  Goal: Plan of Care Review  Outcome: Ongoing (interventions implemented as appropriate)   11/17/18 0649   Coping/Psychosocial   Plan of Care Reviewed With patient   Plan of Care Review   Progress no change   OTHER   Outcome Summary PATIENT IS CONFUSED THIS SHIFT. NIH IS 11. NO COMPLAINTS OF PAIN THIS SHIFT. CONTINUES WITH IV ROCEPHIN. VITAL SIGNS STABLE.

## 2018-11-17 NOTE — H&P
[facility]  HISTORY AND PHYSICAL    Patient Name: Val Hill  : 1930  MRN: [id number]  Primary Care Physician: Provider, No Known  Date of admission: 2018      Subjective   Subjective     Chief Complaint:  Altered mental status     HPI:  Val Hill is a 87 y.o. female with PMH significant for dementia, HLD, DM2 that presents to the ED with complaint of altered mental status.   HPI obtained per ED note as no family is at bedside, and pt unable to give history.   Per ED note: Son mentions that the patient was fine last night, but today she usually facetimes her daughter but could not figure out how to turn the facetime on the TV. The patient called him and sounded confused so he went over to her house and found her kneeling beside her bed not knowing why where she had an episode of bladder incontinence. Today the son reports that the patient was confused about the remote to the TV, and did not know how to work it.   During this exam, pt is unable to state where she is or is unsure why she is here. She denies any complaints when questioned about current symptoms.   She will be admitted to Hospital Medicine for further evaluation.          Review of Systems   Unable to perform ROS: Mental status change        Otherwise 10-system ROS reviewed and is negative except as mentioned in the HPI.    Personal History     Past Medical History:   Diagnosis Date   • Dementia    • Diabetes mellitus (CMS/HCC)    • Hyperlipidemia        Past Surgical History:   Procedure Laterality Date   • ELBOW PROCEDURE     • HYSTERECTOMY         Family History: family history is not on file. Otherwise pertinent FHx was reviewed and unremarkable.     Social History:  reports that she has quit smoking. She does not have any smokeless tobacco history on file. She reports that she does not drink alcohol or use drugs.  Social History     Social History Narrative   • Not on file        Medications:  Available home medication information reviewed.    No Known Allergies    Objective   Objective     Vital Signs:   Temp:  [98.4 °F (36.9 °C)] 98.4 °F (36.9 °C)  Heart Rate:  [72-91] 91  Resp:  [16] 16  BP: (148-190)/(71-97) 148/71        Physical Exam   Constitutional: She appears well-developed and well-nourished. No distress.   HENT:   Head: Normocephalic.   Neck: Normal range of motion. No JVD present.   Cardiovascular: Normal rate, regular rhythm, normal heart sounds and intact distal pulses. Exam reveals no gallop and no friction rub.   No murmur heard.  Pulmonary/Chest: Effort normal and breath sounds normal. No respiratory distress. She has no wheezes. She has no rales.   Abdominal: Soft. Bowel sounds are normal. She exhibits no distension and no mass. There is no tenderness. There is no guarding.   Musculoskeletal: Normal range of motion. She exhibits no edema or tenderness.   Neurological:   Pt is oriented to self, drifts back to sleep during exam. Unable to answer any other orientation questions. Denies any pain. Minimal effort to follow commands. sponatenously able to move all extremities    Skin: Skin is warm and dry. No erythema. No pallor.   Psychiatric: She has a normal mood and affect. Her behavior is normal.   Vitals reviewed.       Results Reviewed:  I have personally reviewed current lab, radiology, and data and agree.    Results from last 7 days   Lab Units  11/16/18   1708   WBC 10*3/mm3  8.43   HEMOGLOBIN g/dL  12.7   HEMATOCRIT %  39.6   PLATELETS 10*3/mm3  346     Results from last 7 days   Lab Units  11/16/18   1708   SODIUM mmol/L  139   POTASSIUM mmol/L  3.9   CHLORIDE mmol/L  103   CO2 mmol/L  27.0   BUN mg/dL  13   CREATININE mg/dL  0.71   GLUCOSE mg/dL  120*   CALCIUM mg/dL  10.2   ALT (SGPT) U/L  13   AST (SGOT) U/L  15     Estimated Creatinine Clearance: 46.9 mL/min (by C-G formula based on SCr of 0.71 mg/dL).  Brief Urine Lab Results  (Last result in the past 365  days)      Color   Clarity   Blood   Leuk Est   Nitrite   Protein   CREAT   Urine HCG        11/16/18 1654 Dark Yellow Turbid Small (1+) Large (3+) Positive 30 mg/dL (1+)             No results found for: BNP  Imaging Results (last 24 hours)     Procedure Component Value Units Date/Time    MRI Brain Without Contrast [[id number]] Collected:  11/16/18 1954     Updated:  11/16/18 2146    Narrative:       EXAM:    MR Head Without Contrast     EXAM DATE/TIME:    11/16/2018 7:54 PM     CLINICAL HISTORY:    87 years old, female; Bacterial infection, unspecified; Encephalopathy,   unspecified; Cerebral ischemia; Urinary tract infection, site not specified;   Signs and symptoms; Altered mental status/memory loss; Confusion or   disorientation; Patient HX: Altered mental status per son that started today.   Acute confusion per son no HX of surgery to head; Additional info: Stroke     TECHNIQUE:    MR of the head without contrast.     COMPARISON:    CT HEAD WO CONTRAST 11/16/2018 7:12 PM     FINDINGS:    Brain:  Small acute infarction in the left corona radiata/superior basal   ganglia. There are periventricular and subcortical areas of flair high signal   consistent with chronic small vessel ischemic disease. No evidence of   hemorrhage or mass effect.  The cortical sulci are enlarged consistent with   cerebral atrophy.    Ventricles:  The ventricles are mildly enlarged.    Bones/joints: Unremarkable.    Soft tissues: Normal.    Sinuses: Normal as visualized. No acute sinusitis.    Mastoid air cells: Normal as visualized. No mastoid effusion.    Orbits: Unremarkable.       Impression:       Small acute infarction in left corona radiata/superior basal ganglia.    THIS DOCUMENT HAS BEEN ELECTRONICALLY SIGNED BY ZARI WHITAKER MD    CT Head Without Contrast [[id number]] Collected:  11/16/18 1759     Updated:  11/16/18 1954    Addenda:        THIS REPORT CONTAINS FINDINGS THAT MAY BE CRITICAL TO PATIENT CARE. The   findings were  verbally communicated via telephone conference with SAMEERA ROCHE   at 7:54 PM EST on 11/16/2018. The findings were acknowledged and   understood.    THIS DOCUMENT HAS BEEN ELECTRONICALLY SIGNED BY THONG BROWN MD  Signed:  11/16/18 1954 by Thong Brown MD    Narrative:       EXAM:    CT Head Without Intravenous Contrast     EXAM DATE/TIME:    11/16/2018 5:59 PM     CLINICAL HISTORY:    87 years old, female; Signs and symptoms; Altered mental status/memory loss;   Additional info: Confusion/delirium, altered loc, unexplained     TECHNIQUE:    Axial computed tomography images of the head/brain without intravenous   contrast.    All CT scans at this facility use at least one of these dose optimization   techniques: automated exposure control; mA and/or kV adjustment per patient   size (includes targeted exams where dose is matched to clinical indication); or   iterative reconstruction.     COMPARISON:    CT HEAD WO CONTRAST 11/19/2016 1:40 AM     FINDINGS:    Brain:  1.4 cm focus of low density in the left superior basal ganglia There   are periventricular and subcortical areas of low attenuation consistent with   chronic small vessel ischemic disease. This may obscure small areas of   ischemia. No evidence of hemorrhage or mass effect. The cortical sulci are   enlarged consistent with cerebral atrophy.    Ventricles:  The ventricles are mildly enlarged consistent with volume loss.    Bones/joints: Normal. No acute fracture.    Sinuses: Normal as visualized. No acute sinusitis.    Mastoid air cells: Normal as visualized. No mastoid effusion.    Orbits:  The visualized orbits are unremarkable.    Soft tissues: Normal.       Impression:       Cerebral atrophy, chronic small vessel ischemic disease.     Likely superimposed acute ischemia noted in the left superior basal ganglia    THIS DOCUMENT HAS BEEN ELECTRONICALLY SIGNED BY THONG BROWN MD    XR Chest 1 View [[id number]] Collected:  11/16/18 5076     Updated:   11/16/18 1728    Narrative:          EXAMINATION: XR CHEST 1 VW - 11/16/2018     INDICATION: Weakness, dizziness, altered mental status.     COMPARISON: 10/25/2016.     FINDINGS: Portable chest reveals cardiac and mediastinal silhouettes to  be within normal limits. Underlying chronic interstitial changes are  seen throughout the lung fields bilaterally. No focal parenchymal  opacification is present. No pleural effusion or pneumothorax.  Degenerative change is seen within the spine. Generator overlying the  right hilar region.           Impression:       Chronic interstitial changes seen throughout the lung fields  bilaterally. No superimposed infectious process.     DICTATED:   11/16/2018  EDITED/ls :   11/16/2018                 Assessment/Plan   Assessment / Plan     Active Hospital Problems    Diagnosis   • **Stroke (CMS/HCC)   • UTI (urinary tract infection)   • Metabolic encephalopathy   • Type 2 diabetes mellitus (CMS/HCC)   • Dementia         Assessment & Plan:  87 year old female presenting to the ED with complaint of altered mental status who is found to have UTI as well as acute CVA    CVA  -CT and MRI as noted above  -neuro checks  -NIHSS  -Neurology consult in am   -fall precautions   -NPO until dysphagia passed  -PT/OT/SLP  -Carotid duplex in am   -ECHO in am   -CBC, BMP, HgA1c, TSH, Lipid panel in am     UTI/metabolic Encephalopathy   -Rocephin started in ED, will continue for now   -Urine cx    Diabetes Mellitus 2  -FSBG ACHS  -ss insulin   -HgA1c in am     Dementia  -Mild   -continue home medications  -has acute encephalopathy as well, normally high functioning/independent      DVT prophylaxis:  -scds    CODE STATUS:    Code Status and Medical Interventions:   Ordered at: 11/17/18 0324     Code Status:    CPR     Medical Interventions (Level of Support Prior to Arrest):    Full       Admission Status:  I believe this patient meets INPATIENT status due to the need for care which can only be  reasonably provided in an hospital setting such as aggressive/expedited ancillary services and/or consultation services, the necessity for IV medications, close physician monitoring and/or the possible need for procedures.  In such, I feel patient’s risk for adverse outcomes and need for care warrant INPATIENT evaluation and predict the patient’s care encounter to likely last beyond 2 midnights.      Electronically signed by JOLYNN Harden, 11/17/18, 3:00 AM.      Brief Attending Admission Attestation     I have seen and examined the patient, performing an independent face-to-face diagnostic evaluation with plan of care reviewed and developed with the advanced practice clinician (APC).      Brief Summary Statement/HPI:   Val Hill is a 87 y.o. female admitted with confusion and UTI along with incidentally found ischemic CVA. History gathered from JOLYNN Velazquez's charting as no family in room and unable to reach them via phone. Patient lives with her son and typically is able to do such activities as FaceTime her daughter and use TV remote however yesterday was unable to do so. He found her in her room after an episode of urinary incontinence. She doesn't recall these events. This morning she is pleasant but doesn't recall any events from yesterday, doesn't know where she is, nor does she know where she lives.    Attending Physical Exam:  Constitutional: No acute distress, awake, alert, elderly, frail appearing  Eyes: PERRLA, sclerae anicteric, no conjunctival injection  HENT: NCAT, mucous membranes moist  Neck: Supple, no thyromegaly, no lymphadenopathy, trachea midline  Respiratory: Clear to auscultation bilaterally, nonlabored respirations   Cardiovascular: RRR, no murmurs, rubs, or gallops, palpable pedal pulses bilaterally  Gastrointestinal: Positive bowel sounds, soft, nontender, nondistended  Musculoskeletal: No bilateral ankle edema, no clubbing or cyanosis to extremities  Psychiatric: Appropriate  affect, cooperative  Neurologic: Alert and interactive, disoriented, moves all of her extremities.  Skin: No rashes    MRI brain personally reviewed with small area of left sided ischemia noted. Agree with interpretation.     Brief Assessment/Plan :    86 y/o female with advanced dementia presenting from home with episode of confusion thought to be due to UTI. She was also incidentally found to have small CVA.  --Her mental status seems better than previously reported in ED after IV antibiotics indicating to me that her encephalopathy was likely due to UTI. Continue abx and await cultures.  --Neurology has seen regarding CVA. Continue asa, statin and await completion of CVA eval.  --SLP eval pending. If passes okay to start diet.    See above for further detailed assessment and plan developed with APC which I have reviewed and/or edited.      Electronically signed by Marily Yang II, DO, 11/17/18, 9:18 AM.

## 2018-11-17 NOTE — PLAN OF CARE
Problem: Patient Care Overview  Goal: Plan of Care Review   11/17/18 1221   Coping/Psychosocial   Plan of Care Reviewed With patient   Plan of Care Review   Progress improving     SLP evaluation completed. Will continue to address cog-com deficits and suspect dysphagia. Please see note for further details and recommendations.    Summary: CSE completed this date. Per neuro note, pt w/ UTI superimposed on dementia, confusion and encephalopathy noted. Cog-com eval completed and basic cog-com goals added. During dysphagia eval, pt noted to have inc'd prep time and oral residue w/ solid item. Wet vocal quality w/ thins, improved with NT. RN reports pt coughed heavily w/ water trial during dysphagia screening. Rec: L2, NT, strict asp precautions, feed pt only when alert, make NPO if any concerns. Meds crushed in puree, re-eval swallowing tomorrow

## 2018-11-17 NOTE — THERAPY EVALUATION
Acute Care - Occupational Therapy Initial Evaluation  [facility]     Patient Name: Val Hill  : 1930  MRN: [id number]  Today's Date: 2018     Date of Referral to OT: 18  Referring Physician: Marily Yang II, DO    Admit Date: 2018       ICD-10-CM ICD-9-CM   1. Encephalopathy acute G93.40 348.30   2. Bacterial UTI N39.0 599.0    A49.9 041.9   3. Cerebral ischemia I67.82 437.1   4. Impaired mobility and ADLs Z74.09 799.89     Patient Active Problem List   Diagnosis   • Stroke (CMS/HCC)   • UTI (urinary tract infection)   • Metabolic encephalopathy   • Type 2 diabetes mellitus (CMS/HCC)   • Dementia   • Leukocytosis     Past Medical History:   Diagnosis Date   • Dementia    • Diabetes mellitus (CMS/HCC)    • Hyperlipidemia      Past Surgical History:   Procedure Laterality Date   • ELBOW PROCEDURE     • HYSTERECTOMY            OT ASSESSMENT FLOWSHEET (last 72 hours)      Occupational Therapy Evaluation     Row Name 18 1030                   OT Evaluation Time/Intention    Subjective Information  no complaints  -MM        Document Type  evaluation  -MM        Mode of Treatment  individual therapy;occupational therapy  -MM        Patient Effort  fair  -MM        Symptoms Noted During/After Treatment  fatigue  -MM           General Information    Patient Profile Reviewed?  yes  -MM        Referring Physician  Marily Yang II, DO  -MM        Patient Observations  lethargic  -MM        Patient/Family Observations  No family members present.  -MM        General Observations of Patient  Receive in supine, on RA, IV intact, tele, wick cath in place.  -MM        Prior Level of Function  -- Difficult to assess, no family members present.  -MM        Equipment Currently Used at Home  -- Difficult to assess; no family members present  -MM        Pertinent History of Current Functional Problem  87yof found by son with acute confusion and incontinence of urine, Presented to ED where found  to have evidence of UTI. MRI (+) for small acute infarct L corona radiata/superior basal ganglia. Dx stroke, encephalopathy, UTI.  -MM        Existing Precautions/Restrictions  fall;NPO;other (see comments) monitor VS; NPO until swallow test completed  -MM        Limitations/Impairments  hearing;safety/cognitive  -MM        Risks Reviewed  patient:;increased discomfort;change in vital signs  -MM        Benefits Reviewed  patient:;improve function;increase independence;increase strength  -MM        Barriers to Rehab  previous functional deficit;cognitive status  -MM           Relationship/Environment    Primary Source of Support/Comfort  child(jeovany)  -MM        Lives With  -- Unable to assess. Via chart, anticipates home with family.  -MM           Resource/Environmental Concerns    Resource/Environmental Concerns  -- unable to assess  -MM           Cognitive Assessment/Intervention- PT/OT    Orientation Status (Cognition)  disoriented to;place;time;situation Stated first name & possibly maiden name.Disoriented to .  -MM        Follows Commands (Cognition)  does not follow one step commands;0-24% accuracy;delayed response/completion;increased processing time needed;initiation impaired;physical/tactile prompts required;repetition of directions required;verbal cues/prompting required  -MM        Safety Deficit (Cognitive)  ability to follow commands;safety precautions awareness;moderate deficit  -MM           Bed Mobility Assessment/Treatment    Bed Mobility Assessment/Treatment  rolling left;rolling right;scooting/bridging;supine-sit;sit-supine  -MM        Rolling Left [place] (Bed Mobility)  verbal cues;maximum assist (25% patient effort)  -MM        Rolling Right [place] (Bed Mobility)  verbal cues;maximum assist (25% patient effort)  -MM        Scooting/Bridging [place] (Bed Mobility)  verbal cues;maximum assist (25% patient effort)  -MM        Supine-Sit [place] (Bed Mobility)  verbal  cues;moderate assist (50% patient effort)  -MM        Sit-Supine [place] (Bed Mobility)  verbal cues;maximum assist (25% patient effort)  -MM        Bed Mobility, Safety Issues  cognitive deficits limit understanding;decreased use of arms for pushing/pulling;decreased use of legs for bridging/pushing;impaired trunk control for bed mobility  -MM        Comment (Bed Mobility)  Slightly better with sit to supine but poor postural control/strength and abrupt attempt to laying down led to maximum assist for sit to supine. Decrease participation/effort impacting rolling independence.  -MM           Functional Mobility    Functional Mobility- Ind. Level  not appropriate to assess  -MM           Transfer Assessment/Treatment    Comment (Transfers)  Pt sitting at EOB and abruptly laid back down despite cues/encouragement for STS transfer attempt.  -MM           ADL Assessment/Intervention    BADL Assessment/Intervention  grooming  -MM           Grooming Assessment/Training    [place] Level (Grooming)  other (see comments) wipe face  -MM        Grooming Position  supine  -MM        Comment (Grooming)  Max cues and assist for participation due to decreased cognitive status/command-following.  -MM           BADL Safety/Performance    Impairments, BADL Safety/Performance  balance;cognition;endurance/activity tolerance;grasp/prehension;coordination;strength;trunk/postural control  -MM           General ROM    GENERAL ROM COMMENTS  BUE PROM WNL  -MM           MMT (Manual Muscle Testing)    General MMT Comments  L shldr FE to 75%, R shldr FE to 50%; remainder of BUE 3/5.  -MM           Motor Assessment/Interventions    Additional Documentation  Therapeutic Exercise (Group);Therapeutic Exercise Interventions (Group);Balance (Group);Balance Interventions (Group)  -MM           Therapeutic Exercise    45096 - OT Therapeutic Exercise Minutes  10  -MM           Therapeutic Exercise    Upper Extremity Range of Motion  (Therapeutic Exercise)  shoulder flexion/extension, bilateral;shoulder abduction/adduction, bilateral;shoulder horizontal abduction/adduction, bilateral;elbow flexion/extension, bilateral;wrist flexion/extension, bilateral hand pumps  -MM        Exercise Type (Therapeutic Exercise)  AAROM (active assistive range of motion);PROM (passive range of motion)  -MM        Position (Therapeutic Exercise)  supine  -MM        Sets/Reps (Therapeutic Exercise)  1/10  -MM        Comment (Therapeutic Exercise)  Increased time, effort, verbal cue, visual demo and rest breaks required.  -MM           Balance    Balance  static sitting balance  -MM           Static Sitting Balance    Level of [place] (Unsupported Sitting, Static Balance)  moderate assist, 50 to 74% patient effort;other (see comments) leans toward right; typically min A but moment of mod A  -MM           Sensory Assessment/Intervention    Sensory General Assessment  -- unable to assess  -MM           Positioning and Restraints    Pre-Treatment Position  in bed  -MM        Post Treatment Position  bed  -MM           Pain Assessment    Additional Documentation  Pain Scale: FACES Pre/Post-Treatment (Group);Pain Scale: Numbers Pre/Post-Treatment (Group)  -MM           Pain Scale: Numbers Pre/Post-Treatment    Pain Scale: Numbers, Pretreatment  0/10 - no pain  -MM        Pain Scale: Numbers, Post-Treatment  0/10 - no pain  -MM           Pain Scale: FACES Pre/Post-Treatment    Pain: FACES Scale, Pretreatment  0-->no hurt  -MM        Pain: FACES Scale, Post-Treatment  0-->no hurt  -MM           Clinical Impression (OT)    Date of Referral to OT  11/17/18  -MM        OT Diagnosis  Decreased independence with ADL, tsf and fnxl mobility.  -MM        Criteria for Skilled Therapeutic Interventions Met (OT Eval)  yes;treatment indicated  -MM        Rehab Potential (OT Eval)  fair, will monitor progress closely  -MM        Therapy Frequency (OT Eval)  daily  -MM        Care  Plan Review (OT)  evaluation/treatment results reviewed;care plan/treatment goals reviewed;risks/benefits reviewed;patient/other agree to care plan  -MM        Anticipated Discharge Disposition (OT)  skilled nursing facility  -MM           Vital Signs    Pre Systolic BP Rehab  142  -MM        Pre Treatment Diastolic BP  77  -MM        Post Systolic BP Rehab  145  -MM        Post Treatment Diastolic BP  76  -MM        Pretreatment Heart Rate (beats/min)  76  -MM        Posttreatment Heart Rate (beats/min)  75  -MM        Pre SpO2 (%)  95  -MM        O2 Delivery Pre Treatment  room air  -MM        Post SpO2 (%)  96  -MM        O2 Delivery Post Treatment  room air  -MM        Pre Patient Position  Supine  -MM        Intra Patient Position  Sitting  -MM        Post Patient Position  Supine  -MM           Planned OT Interventions    Planned Therapy Interventions (OT Eval)  activity tolerance training;BADL retraining;functional balance retraining;neuromuscular control/coordination retraining;occupation/activity based interventions;ROM/therapeutic exercise;strengthening exercise;transfer/mobility retraining  -MM           OT Goals    Bed Mobility Goal Selection (OT)  bed mobility, OT goal 1  -MM        Transfer Goal Selection (OT)  transfer, OT goal 1  -MM        Strength Goal Selection (OT)  strength, OT goal 1  -MM        Balance Goal Selection (OT)  balance, OT goal 1  -MM        Additional Documentation  Balance Goal Selection (OT) (Row);Strength Goal Selection (OT) (Row)  -MM           Bed Mobility Goal 1 (OT)    Activity/Assistive Device (Bed Mobility Goal 1, OT)  rolling to left;rolling to right;supine to sit;sit to supine/supine to sit  -MM        [place] Level/Cues Needed (Bed Mobility Goal 1, OT)  minimum assist (75% or more patient effort)  -MM        Time Frame (Bed Mobility Goal 1, OT)  by discharge  -MM           Transfer Goal 1 (OT)    Activity/Assistive Device (Transfer Goal 1, OT)   sit-to-stand/stand-to-sit;bed-to-chair/chair-to-bed;walker, rolling  -MM        [place] Level/Cues Needed (Transfer Goal 1, OT)  minimum assist (75% or more patient effort)  -MM        Time Frame (Transfer Goal 1, OT)  by discharge  -MM           Strength Goal 1 (OT)    Strength Goal 1 (OT)  Pt will increase BUE MMT by 1/2 muscle grade to support BADL.  -MM           Balance Goal 1 (OT)    Activity/Assistive Device (Balance Goal 1, OT)  sitting, static  -MM        [place] Level/Cues Needed (Balance Goal 1, OT)  supervision required  -MM        Time Frame (Balance Goal 1, OT)  by discharge  -MM           Living Environment    Home Accessibility  -- unable to assess  -MM          User Key  (r) = Recorded By, (t) = Taken By, (c) = Cosigned By    Initials Name Effective Dates    Lacy Cash, OT 06/22/15 -          Occupational Therapy Education     Title: PT OT SLP Therapies (Active)     Topic: Occupational Therapy (Active)     Point: ADL training (Active)     Description: Instruct learner(s) on proper safety adaptation and remediation techniques during self care or transfers.   Instruct in proper use of assistive devices.    Learning Progress Summary           Patient Acceptance, E,D, NR by KAY at 11/17/2018 10:30 AM    Comment:  Educated on role of OT/process of IE, benefits of activity, BUE HEP, and precautions/improved mechanics for ADL, BUE therex and bed mobility. Question pt understanding due to decreased cognitive status/confusion this date.                   Point: Home exercise program (Active)     Description: Instruct learner(s) on appropriate technique for monitoring, assisting and/or progressing therapeutic exercises/activities.    Learning Progress Summary           Patient Acceptance, E,D, NR by KAY at 11/17/2018 10:30 AM    Comment:  Educated on role of OT/process of IE, benefits of activity, BUE HEP, and precautions/improved mechanics for ADL, BUE therex and bed mobility.  Question pt understanding due to decreased cognitive status/confusion this date.                   Point: Precautions (Active)     Description: Instruct learner(s) on prescribed precautions during self-care and functional transfers.    Learning Progress Summary           Patient Acceptance, E,D, NR by MM at 11/17/2018 10:30 AM    Comment:  Educated on role of OT/process of IE, benefits of activity, BUE HEP, and precautions/improved mechanics for ADL, BUE therex and bed mobility. Question pt understanding due to decreased cognitive status/confusion this date.                   Point: Body mechanics (Active)     Description: Instruct learner(s) on proper positioning and spine alignment during self-care, functional mobility activities and/or exercises.    Learning Progress Summary           Patient Acceptance, E,D, NR by MM at 11/17/2018 10:30 AM    Comment:  Educated on role of OT/process of IE, benefits of activity, BUE HEP, and precautions/improved mechanics for ADL, BUE therex and bed mobility. Question pt understanding due to decreased cognitive status/confusion this date.                               User Key     Initials Effective Dates Name Provider Type Discipline     06/22/15 -  Lacy Russell, OT Occupational Therapist OT                  OT Recommendation and Plan  Outcome Summary/Treatment Plan (OT)  Anticipated Discharge Disposition (OT): skilled nursing facility  Planned Therapy Interventions (OT Eval): activity tolerance training, BADL retraining, functional balance retraining, neuromuscular control/coordination retraining, occupation/activity based interventions, ROM/therapeutic exercise, strengthening exercise, transfer/mobility retraining  Therapy Frequency (OT Eval): daily  Plan of Care Review  Plan of Care Reviewed With: patient  Plan of Care Reviewed With: patient  Outcome Summary: OT IE complete. Difficult to assess PLOF due to pt confusion/cognitive status. Required max A bed  mobility. Pt abruptly laid back down despite cue/encouragement for STS tsf attempt. Max assist/cue for grooming d/t decreased participation/effort. Would benefit from skilled OT to address BADL, tsf and fnxl mob deficits to progress toward PLOF. Recommend d/c to SNF.    Outcome Measures     Row Name 11/17/18 1030             How much help from another is currently needed...    Putting on and taking off regular lower body clothing?  1  -MM      Bathing (including washing, rinsing, and drying)  1  -MM      Toileting (which includes using toilet bed pan or urinal)  1  -MM      Putting on and taking off regular upper body clothing  1  -MM      Taking care of personal grooming (such as brushing teeth)  2  -MM      Eating meals  2  -MM      Score  8  -MM         Modified [place] Scale    Modified [place] Scale  5 - Severe disability.  Bedridden, incontinent, and requiring constant nursing care and attention.  -MM         Functional Assessment    Outcome Measure Options  AM-PAC 6 Clicks Daily Activity (OT);Modified [place]  -MM        User Key  (r) = Recorded By, (t) = Taken By, (c) = Cosigned By    Initials Name Provider Type    Lacy Cash, OT Occupational Therapist          Time Calculation:   Time Calculation- OT     Row Name 11/17/18 1030             Time Calculation- OT    OT Start Time  1030  -MM      Total Timed Code Minutes- OT  10 minute(s)  -MM      OT Received On  11/17/18  -MM      OT Goal Re-Cert Due Date  11/27/18  -MM         Timed Charges    94544 - OT Therapeutic Exercise Minutes  10  -MM        User Key  (r) = Recorded By, (t) = Taken By, (c) = Cosigned By    Initials Name Provider Type    Lacy Cash OT Occupational Therapist        Therapy Suggested Charges     Code   Minutes Charges    19002 (CPT®) Hc Ot Neuromusc Re Education Ea 15 Min      45412 (CPT®) Hc Ot Ther Proc Ea 15 Min 10 1    84330 (CPT®) Hc Ot Therapeutic Act Ea 15 Min      35727 (CPT®) Hc Ot Manual Therapy Ea  15 Min      87031 (CPT®) Hc Ot Iontophoresis Ea 15 Min      48520 (CPT®) Hc Ot Elec Stim Ea-Per 15 Min      66914 (CPT®) Hc Ot Ultrasound Ea 15 Min      75082 (CPT®) Hc Ot Self Care/Mgmt/Train Ea 15 Min      Total  10 1        Therapy Charges for Today     Code Description Service Date Service Provider Modifiers Qty    34024830719 HC OT THER PROC EA 15 MIN 11/17/2018 Lacy Russell, OT GO 1    [id number] HC OT EVAL MOD COMPLEXITY 2 11/17/2018 Lacy Russell OT GO 1               Lacy Russell OT  11/17/2018

## 2018-11-17 NOTE — THERAPY EVALUATION
Acute Care - Speech Language Pathology Initial Evaluation  [facility]   Clinical Swallow Evaluation  Cognitive-Communication Evaluation       Patient Name: Val Hill  : 1930  MRN: [id number]  Today's Date: 2018        Referring Physician: Marily Yang II, DO      Admit Date: 2018     Visit Dx:    ICD-10-CM ICD-9-CM   1. Encephalopathy acute G93.40 348.30   2. Bacterial UTI N39.0 599.0    A49.9 041.9   3. Cerebral ischemia I67.82 437.1   4. Impaired mobility and ADLs Z74.09 799.89     Patient Active Problem List   Diagnosis   • Stroke (CMS/HCC)   • UTI (urinary tract infection)   • Metabolic encephalopathy   • Type 2 diabetes mellitus (CMS/HCC)   • Dementia   • Leukocytosis     Past Medical History:   Diagnosis Date   • Dementia    • Diabetes mellitus (CMS/HCC)    • Hyperlipidemia      Past Surgical History:   Procedure Laterality Date   • ELBOW PROCEDURE     • HYSTERECTOMY          SLP EVALUATION (last 72 hours)      SLP SLC Evaluation     Row Name 18 1100                   General Information    Prior Level of Function-Communication  cognitive-linguistic impairment;other (see comments) baseline dementia  -SG        Patient's Goals for Discharge  patient could not state  -SG           Comprehension Assessment/Intervention    Comprehension Assessment/Intervention  Auditory Comprehension  -SG           Auditory Comprehension Assessment/Intervention    Auditory Comprehension (Communication)  other (see comments) impacted by cognition  -SG        Answers Questions (Communication)  yes/no;WFL  -SG        Able to Follow Commands (Communication)  1-step;mild impairment  -SG           Expression Assessment/Intervention    Expression Assessment/Intervention  verbal expression  -SG           Verbal Expression Assessment/Intervention    Verbal Expression  other (see comments) impacted by cognition   -SG        Automatic Speech (Communication)  response to greeting;WFL  -SG         Confrontational Naming  WFL  -SG        Sentence Formulation  moderate impairment  -SG           Oral Motor Structure and Function    Oral Motor Structure and Function  WFL  -SG           Motor Speech Assessment/Intervention    Motor Speech Function  WFL  -SG           Cursory Voice Assessment/Intervention    Quality and Resonance (Voice)  WFL  -SG           Speaking Valve    Respiratory Status  WFL  -SG           Cognitive Assessment Intervention- SLP    Cognitive Function (Cognition)  moderate impairment  -SG        Orientation Status (Cognition)  moderate impairment  -SG        Attention (Cognitive)  mild impairment;moderate impairment  -SG        Pragmatics (Communication)  moderate impairment  -SG           Communication Treatment Objective and Progress Goals (SLP)    Cognitive Linguistic Treatment Objectives  Cognitive Linguistic Treatment Objectives (Group)  -SG        Additional Goals  Additional Goals (Group)  -SG           Cognitive Linguistic Treatment Objectives    Awareness of Basic Personal Information Selection  awareness of basic personal information, SLP goal 1  -SG        Attention Selection  attention, SLP goal 1  -SG           Awareness of Basic Personal Information Goal 1 (SLP)    Improve Awareness of Basic Personal Information Goal 1 (SLP)  answering yes/no questions regarding personal/biographical information;naming self, family members;80%;with moderate cues (50-74%)  -SG        Time Frame (Awareness of Basic Personal Information Goal 1, SLP)  short term goal (STG);by discharge  -SG        Progress/Outcomes (Awareness of Basic Personal Information Goal 1, SLP)  goal ongoing  -SG           Attention Goal 1 (SLP)    Improve Attention by Goal 1 (SLP)  looking at speaker;attending to task;80%  -SG        Time Frame (Attention Goal 1, SLP)  short term goal (STG);by discharge  -SG        Progress/Outcomes (Attention Goal 1, SLP)  goal ongoing  -SG           Additional Goals    Additional Goal  Selection  additional goal, SLP goal 1  -SG           Additional Goal 1 (SLP)    Additional Goal 1, SLP  LTG: Pt will demonstrate improved functional cognition in the hospital setting with 80% accuracy and moderate cueing to express wants and needs.   -SG        Time Frame (Additional Goal 1, SLP)  short term goal (STG);by discharge  -SG        Progress/Outcomes (Additional Goal 1, SLP)  goal ongoing  -SG          User Key  (r) = Recorded By, (t) = Taken By, (c) = Cosigned By    Initials Name Effective Dates    SG Sunitha Hdz, MS CCC-SLP 06/22/15 -              EDUCATION  The patient has been educated in the following areas:     Cognitive Impairment Communication Impairment.    SLP Recommendation and Plan                   Anticipated Dischage Disposition: skilled nursing facility, anticipate therapy at next level of care         Therapy Frequency (Swallow): PRN               Plan of Care Review  Plan of Care Reviewed With: patient  Plan of Care Reviewed With: patient  Progress: improving        SLP GOALS     Row Name 11/17/18 1100             Awareness of Basic Personal Information Goal 1 (SLP)    Improve Awareness of Basic Personal Information Goal 1 (SLP)  answering yes/no questions regarding personal/biographical information;naming self, family members;80%;with moderate cues (50-74%)  -SG      Time Frame (Awareness of Basic Personal Information Goal 1, SLP)  short term goal (STG);by discharge  -SG      Progress/Outcomes (Awareness of Basic Personal Information Goal 1, SLP)  goal ongoing  -SG         Attention Goal 1 (SLP)    Improve Attention by Goal 1 (SLP)  looking at speaker;attending to task;80%  -SG      Time Frame (Attention Goal 1, SLP)  short term goal (STG);by discharge  -SG      Progress/Outcomes (Attention Goal 1, SLP)  goal ongoing  -SG         Additional Goal 1 (SLP)    Additional Goal 1, SLP  LTG: Pt will demonstrate improved functional cognition in the hospital setting with 80%  accuracy and moderate cueing to express wants and needs.   -SG      Time Frame (Additional Goal 1, SLP)  short term goal (STG);by discharge  -SG      Progress/Outcomes (Additional Goal 1, SLP)  goal ongoing  -SG        User Key  (r) = Recorded By, (t) = Taken By, (c) = Cosigned By    Initials Name Provider Type    Sunitha Wilson, MS CCC-SLP Speech and Language Pathologist                  Time Calculation:     Time Calculation- SLP     Row Name 18 1230             Time Calculation- SLP    SLP Start Time  1100  -SG      SLP Received On  18  -SG        User Key  (r) = Recorded By, (t) = Taken By, (c) = Cosigned By    Initials Name Provider Type    Sunitha Wilson, MS CCC-SLP Speech and Language Pathologist          Therapy Charges for Today     Code Description Service Date Service Provider Modifiers Qty    93036104098 HC ST EVAL SPEECH AND PROD W LANG  1 2018 Sunitha Hdz MS CCC-SLP GN 1    [id number] HC ST EVAL ORAL PHARYNG SWALLOW 2 2018 Sunitha Hdz MS CCC-SLP GN 1                     Sunitha Hdz MS CCC-SLP  2018 and Acute Care - Speech Language Pathology   Swallow Initial Evaluation [facility]     Patient Name: Val Hill  : 1930  MRN: [id number]  Today's Date: 2018        Referring Physician: Marily Yang II, DO      Admit Date: 2018    Visit Dx:     ICD-10-CM ICD-9-CM   1. Encephalopathy acute G93.40 348.30   2. Bacterial UTI N39.0 599.0    A49.9 041.9   3. Cerebral ischemia I67.82 437.1   4. Impaired mobility and ADLs Z74.09 799.89     Patient Active Problem List   Diagnosis   • Stroke (CMS/HCC)   • UTI (urinary tract infection)   • Metabolic encephalopathy   • Type 2 diabetes mellitus (CMS/HCC)   • Dementia   • Leukocytosis     Past Medical History:   Diagnosis Date   • Dementia    • Diabetes mellitus (CMS/HCC)    • Hyperlipidemia      Past Surgical History:    Procedure Laterality Date   • ELBOW PROCEDURE     • HYSTERECTOMY          SWALLOW EVALUATION (last 72 hours)      SLP Adult Swallow Evaluation     Row Name 11/17/18 1100                   Rehab Evaluation    Document Type  evaluation  -SG        Subjective Information  no complaints  -SG        Patient Observations  alert;cooperative  -SG        Patient/Family Observations  no family  -SG        Patient Effort  adequate  -SG           General Information    Patient Profile Reviewed  yes  -SG        Pertinent History Of Current Problem  adm w/ r/o CVA, UTI cuperimposed on dementia, confusion and encephalopathy  -SG        Current Method of Nutrition  NPO  -SG        Precautions/Limitations, Vision  WFL;for purposes of eval  -SG        Precautions/Limitations, Hearing  WFL;for purposes of eval  -SG        Prior Level of Function-Communication  other (see comments) unknown baseline, dementia per chart  -SG        Prior Level of Function-Swallowing  other (see comments) unknwon baseline  -SG        Plans/Goals Discussed with  patient;agreed upon  -SG        Barriers to Rehab  cognitive status  -SG        Patient's Goals for Discharge  return to PO diet  -SG           Pain Scale: Numbers Pre/Post-Treatment    Pain Scale: Numbers, Pretreatment  0/10 - no pain  -SG        Pain Scale: Numbers, Post-Treatment  0/10 - no pain  -SG           Oral Motor and Function    Dentition Assessment  natural, present and adequate  -SG        Secretion Management  WNL/WFL  -SG        Mucosal Quality  moist, healthy  -SG        Gag Response  WFL  -SG        Volitional Swallow  weak  -SG        Volitional Cough  weak;reduced respiratory support  -SG           Oral Musculature and Cranial Nerve Assessment    Oral Motor General Assessment  generalized oral motor weakness  -SG           General Eating/Swallowing Observations    Respiratory Support Currently in Use  room air  -SG        Eating/Swallowing Skills  fed by SLP  -SG         Positioning During Eating  upright 90 degree;upright in bed  -SG        Utensils Used  spoon;cup;straw  -SG        Consistencies Trialed  regular textures;pureed;thin liquids  -SG           Clinical Swallow Eval    Oral Prep Phase  impaired  -SG        Oral Transit  WFL  -SG        Oral Residue  impaired  -SG        Pharyngeal Phase  suspected pharyngeal impairment  -SG        Clinical Swallow Evaluation Summary  CSE completed this date. Per neuro note, pt w/ UTI superimposed on dementia, confusion and encephalopathy noted. Cog-com eval completed and basic cog-com goals added. During dysphagia eval, pt noted to have inc'd prep time and oral residue w/ solid item. Wet vocal quality w/ thins, improved with NT. RN reports pt coughed heavily w/ water trial during dysphagia screening. Rec: L2, NT, strict asp precautions, feed pt only when alert, make NPO if any concerns. Meds crushed in puree, re-eval swallowing tomorrow.   -SG           Oral Prep Concerns    Oral Prep Concerns  prolonged mastication;inefficient mastication  -SG        Prolonged Mastication  regular consistencies  -SG        Inefficient Mastication  regular consistencies  -SG           Oral Residue Concerns    Oral Residue Concerns  sulci residue, bilateral  -SG        Sulci Residue, Bilateral  regular consistencies  -SG           Pharyngeal Phase Concerns    Pharyngeal Phase Concerns  wet vocal quality  -SG        Wet Vocal Quality  thin  -SG           Clinical Impression    SLP Swallowing Diagnosis  suspected pharyngeal dysfunction  -SG        Functional Impact  risk of aspiration/pneumonia  -SG        Rehab Potential/Prognosis, Swallowing  good, to achieve stated therapy goals  -SG        Swallow Criteria for Skilled Therapeutic Interventions Met  demonstrates skilled criteria  -SG           Recommendations    Therapy Frequency (Swallow)  PRN  -SG        SLP Diet Recommendation  puree;nectar thick liquids  -SG        Recommended Diagnostics  reassess  via clinical swallow evaluation  -SG        Recommended Precautions and Strategies  upright posture during/after eating;small bites of food and sips of liquid  -SG        SLP Rec. for Method of Medication Administration  meds crushed;with pudding or applesauce  -SG        Monitor for Signs of Aspiration  yes;notify SLP if any concerns  -SG        Anticipated Dischage Disposition  skilled nursing facility;anticipate therapy at next level of care  -SG          User Key  (r) = Recorded By, (t) = Taken By, (c) = Cosigned By    Initials Name Effective Dates    SHIV Oleary-Sunitha Hawkins, MS CCC-SLP 06/22/15 -           EDUCATION  The patient has been educated in the following areas:   Dysphagia (Swallowing Impairment) Oral Care/Hydration Modified Diet Instruction.    SLP Recommendation and Plan  SLP Swallowing Diagnosis: suspected pharyngeal dysfunction  SLP Diet Recommendation: puree, nectar thick liquids  Recommended Precautions and Strategies: upright posture during/after eating, small bites of food and sips of liquid     Monitor for Signs of Aspiration: yes, notify SLP if any concerns  Recommended Diagnostics: reassess via clinical swallow evaluation  Swallow Criteria for Skilled Therapeutic Interventions Met: demonstrates skilled criteria  Anticipated Dischage Disposition: skilled nursing facility, anticipate therapy at next level of care  Rehab Potential/Prognosis, Swallowing: good, to achieve stated therapy goals  Therapy Frequency (Swallow): PRN          Plan of Care Reviewed With: patient  Plan of Care Review  Plan of Care Reviewed With: patient  Progress: improving    SLP GOALS     Row Name 11/17/18 1100             Awareness of Basic Personal Information Goal 1 (SLP)    Improve Awareness of Basic Personal Information Goal 1 (SLP)  answering yes/no questions regarding personal/biographical information;naming self, family members;80%;with moderate cues (50-74%)  -      Time Frame (Awareness of Basic  Personal Information Goal 1, SLP)  short term goal (STG);by discharge  -SG      Progress/Outcomes (Awareness of Basic Personal Information Goal 1, SLP)  goal ongoing  -SG         Attention Goal 1 (SLP)    Improve Attention by Goal 1 (SLP)  looking at speaker;attending to task;80%  -SG      Time Frame (Attention Goal 1, SLP)  short term goal (STG);by discharge  -SG      Progress/Outcomes (Attention Goal 1, SLP)  goal ongoing  -SG         Additional Goal 1 (SLP)    Additional Goal 1, SLP  LTG: Pt will demonstrate improved functional cognition in the hospital setting with 80% accuracy and moderate cueing to express wants and needs.   -SG      Time Frame (Additional Goal 1, SLP)  short term goal (STG);by discharge  -SG      Progress/Outcomes (Additional Goal 1, SLP)  goal ongoing  -SG        User Key  (r) = Recorded By, (t) = Taken By, (c) = Cosigned By    Initials Name Provider Type    Sunitha Wilson MS CCC-SLP Speech and Language Pathologist             Time Calculation:   Time Calculation- SLP     Row Name 11/17/18 1230             Time Calculation- SLP    SLP Start Time  1100  -SG      SLP Received On  11/17/18  -SG        User Key  (r) = Recorded By, (t) = Taken By, (c) = Cosigned By    Initials Name Provider Type    Sunitha Wilson MS CCC-SLP Speech and Language Pathologist          Therapy Charges for Today     Code Description Service Date Service Provider Modifiers Qty    92183137632 HC ST EVAL SPEECH AND PROD W LANG  1 11/17/2018 Sunitha Hdz MS CCC-RADHA GN 1    [id number] HC ST EVAL ORAL PHARYNG SWALLOW 2 11/17/2018 Sunitha Hdz MS CCC-SLP GN 1               MS BO Scott  11/17/2018

## 2018-11-18 NOTE — THERAPY EVALUATION
Acute Care - Physical Therapy Initial Evaluation  [facility]     Patient Name: Val Hill  : 1930  MRN: [id number]  Today's Date: 2018   Onset of Illness/Injury or Date of Surgery: 18  Date of Referral to PT: 18  Referring Physician: MD RAMIREZ      Admit Date: 2018    Visit Dx:     ICD-10-CM ICD-9-CM   1. Encephalopathy acute G93.40 348.30   2. Bacterial UTI N39.0 599.0    A49.9 041.9   3. Cerebral ischemia I67.82 437.1   4. Impaired mobility and ADLs Z74.09 799.89   5. Impaired functional mobility, balance, gait, and endurance Z74.09 V49.89     Patient Active Problem List   Diagnosis   • Stroke (CMS/HCC)   • UTI (urinary tract infection)   • Metabolic encephalopathy   • Type 2 diabetes mellitus (CMS/HCC)   • Dementia   • Leukocytosis     Past Medical History:   Diagnosis Date   • Dementia    • Diabetes mellitus (CMS/HCC)    • Hyperlipidemia      Past Surgical History:   Procedure Laterality Date   • ELBOW PROCEDURE     • HYSTERECTOMY          PT ASSESSMENT (last 12 hours)      Physical Therapy Evaluation     Row Name 18 0926          PT Evaluation Time/Intention    Subjective Information  no complaints  -CD     Document Type  evaluation COMPLETED CHART REVIEW [id number].   -CD     Mode of Treatment  physical therapy  -CD     Patient Effort  adequate  -CD     Symptoms Noted During/After Treatment  fatigue  -CD     Row Name 18          General Information    Patient Profile Reviewed?  yes  -CD     Onset of Illness/Injury or Date of Surgery  18  -CD     Referring Physician  MD RAMIREZ  -CD     Patient Observations  cooperative;agree to therapy;alert  -CD     Patient/Family Observations  NO FAMILY PRESENT.   -CD     General Observations of Patient  PT SLEEPING UPON ARRIVAL ON RA, WEARING HEEL PROTECTORS.   -CD     Prior Level of Function  -- NO FAMILY PRESENT. PT POOR HISTORIAN.   -CD     Equipment Currently Used at Home  -- PT POOR HISTORIAN, FAMILY NOT  "PRESENT. PT STATES USED R WX.   -CD     Pertinent History of Current Functional Problem  87yof found by son with acute confusion and incontinence of urine, Presented to ED where found to have evidence of UTI. MRI (+) for small acute infarct L corona radiata/superior basal ganglia. Dx stroke, encephalopathy, UTI.  -CD     Existing Precautions/Restrictions  fall  -CD     Limitations/Impairments  hearing;safety/cognitive  -CD     Risks Reviewed  patient:;LOB;dizziness;change in vital signs  -CD     Benefits Reviewed  patient:;improve function;increase independence;increase strength;increase balance;increase knowledge  -CD     Barriers to Rehab  cognitive status DEMENTIA PER PMH. UNSURE OF PLOF.   -CD     Row Name 11/18/18 0926          Relationship/Environment    Lives With  alone PT STATES ALONE. CHART MENTIONS SON \"CHECKED ON HER\"   -CD     Row Name 11/18/18 0926          Resource/Environmental Concerns    Current Living Arrangements  home/apartment/condo PT REPORTS CONDO BUT UNABLE TO PROVIDE MORE INFO.   -CD     Row Name 11/18/18 0926          Cognitive Assessment/Intervention- PT/OT    Orientation Status (Cognition)  oriented to;person;disoriented to;place;situation;time COULD NOT RECALL HER SON'S NAME.   -CD     Follows Commands (Cognition)  follows one step commands;75-90% accuracy;increased processing time needed;delayed response/completion;repetition of directions required;verbal cues/prompting required;physical/tactile prompts required  -CD     Safety Deficit (Cognitive)  awareness of need for assistance;insight into deficits/self awareness;moderate deficit;safety precautions follow-through/compliance  -CD     Row Name 11/18/18 0918          Safety Issues, Functional Mobility    Safety Issues Affecting Function (Mobility)  awareness of need for assistance;insight into deficits/self awareness;safety precautions follow-through/compliance;sequencing abilities  -CD     Impairments Affecting Function (Mobility)  " cognition;balance;endurance/activity tolerance;strength  -CD     Row Name 11/18/18 0926          Bed Mobility Assessment/Treatment    Bed Mobility Assessment/Treatment  rolling right;sidelying-sit  -CD     Rolling Right [place] (Bed Mobility)  moderate assist (50% patient effort);verbal cues  -CD     Sidelying-Sit [place] (Bed Mobility)  maximum assist (25% patient effort);verbal cues  -CD     Bed Mobility, Safety Issues  decreased use of arms for pushing/pulling;decreased use of legs for bridging/pushing;cognitive deficits limit understanding  -CD     Assistive Device (Bed Mobility)  bed rails;draw sheet;head of bed elevated  -CD     Comment (Bed Mobility)  CUES FOR SEQUENCING.   -CD     Row Name 11/18/18 0926          Transfer Assessment/Treatment    Transfer Assessment/Treatment  stand pivot/stand step transfer;toilet transfer  -CD     [place] Level (Toilet Transfer)  moderate assist (50% patient effort);verbal cues  -CD     Assistive Device (Toilet Transfer)  commode, bedside without drop arms;walker, front-wheeled  -CD     Row Name 11/18/18 0926          Stand Pivot/Stand Step Transfer    Stand Pivot/Stand Step [place]  moderate assist (50% patient effort);verbal cues [facility] TO [facility].   -CD     Assistive Device (Stand Pivot Stand Step Transfer)  walker, front-wheeled  -CD     Row Name 11/18/18 0926          Toilet Transfer    Type (Toilet Transfer)  sit-stand;stand-sit  -CD     Row Name 11/18/18 0926          Gait/Stairs Assessment/Training    98987 - Gait Training Minutes   15  -CD     Gait/Stairs Assessment/Training  gait/ambulation independence;gait/ambulation assistive device;distance ambulated  -CD     [place] Level (Gait)  moderate assist (50% patient effort);verbal cues INITIALLY MIN ASSIST, AS FATIGUED MOD ASSIST.   -CD     Assistive Device (Gait)  walker, front-wheeled  -CD     Distance in Feet (Gait)  20   -CD     Deviations/Abnormal Patterns (Gait)  marlen decreased;base of  support, narrow  -CD     Bilateral Gait Deviations  heel strike decreased;forward flexed posture;knee buckling, bilateral AS FATIGUED KNEES BEGAN TO BUCKLE.   -CD     Comment (Gait/Stairs)  REQUIRED MANUAL ASSIST TO GUIDE WALKER, LIMITED BY FATIGUE, WEAKNESS. CUES FOR UPRIGHT POSTURE AND INCREASED STEP LENGTH.   -CD     Row Name 11/18/18 0926          General ROM    GENERAL ROM COMMENTS  B LE AAROM WFL'S   -CD     Row Name 11/18/18 0926          MMT (Manual Muscle Testing)    General MMT Comments  B LE GROSSLY 3 TO 4-/5.   -CD     Row Name 11/18/18 0926          Balance    Balance  static sitting balance;static standing balance  -CD     Row Name 11/18/18 0926          Static Sitting Balance    Level of [place] (Unsupported Sitting, Static Balance)  contact guard assist  -CD     Sitting Position (Unsupported Sitting, Static Balance)  sitting on edge of bed  -CD     Time Able to Maintain Position (Unsupported Sitting, Static Balance)  3 to 4 minutes  -CD     Row Name 11/18/18 0926          Static Standing Balance    Level of [place] (Supported Standing, Static Balance)  minimal assist, 75% patient effort  -CD     Time Able to Maintain Position (Supported Standing, Static Balance)  1 to 2 minutes  -CD     Assistive Device Utilized (Supported Standing, Static Balance)  rolling walker  -CD     Comment (Supported Standing, Static Balance)  STOOD FOR HYGIENE AFTER TOILETING. DEPENDENT WITH HYGIENE. WORKED ON UPRIGHT POSTURE, WT SHIFT R/L WHILE STANDING.   -CD     Row Name 11/18/18 0926          Pain Scale: Numbers Pre/Post-Treatment    Pain Scale: Numbers, Pretreatment  0/10 - no pain  -CD     Pain Scale: Numbers, Post-Treatment  0/10 - no pain  -CD     Row Name 11/18/18 0926          Plan of Care Review    Plan of Care Reviewed With  patient  -CD     Row Name 11/18/18 0926          Physical Therapy Clinical Impression    Date of Referral to PT  11/16/18  -CD     PT Diagnosis (PT Clinical Impression)  IMPAIRED  FUNCTIONAL MOBILITY.   -CD     Patient/Family Goals Statement (PT Clinical Impression)  DID NOT STATE.   -CD     Criteria for Skilled Interventions Met (PT Clinical Impression)  yes  -CD     Rehab Potential (PT Clinical Summary)  good, to achieve stated therapy goals  -CD     Care Plan Review (PT)  evaluation/treatment results reviewed;care plan/treatment goals reviewed;risks/benefits reviewed;current/potential barriers reviewed;patient/other agree to care plan  -     Row Name 11/18/18 0926          Vital Signs    Pre Systolic BP Rehab  -- VSS, NSG CLEARED FOR TREATMENT.   -     Row Name 11/18/18 0926          Physical Therapy Goals    Bed Mobility Goal Selection (PT)  bed mobility, PT goal 1  -CD     Transfer Goal Selection (PT)  transfer, PT goal 1  -CD     Gait Training Goal Selection (PT)  gait training, PT goal 1  -CD     Row Name 11/18/18 0926          Bed Mobility Goal 1 (PT)    Activity/Assistive Device (Bed Mobility Goal 1, PT)  sit to supine;supine to sit  -CD     [place] Level/Cues Needed (Bed Mobility Goal 1, PT)  contact guard assist  -CD     Time Frame (Bed Mobility Goal 1, PT)  long term goal (LTG);2 weeks  -CD     Row Name 11/18/18 0926          Transfer Goal 1 (PT)    Activity/Assistive Device (Transfer Goal 1, PT)  transfers, all;walker, rolling  -CD     [place] Level/Cues Needed (Transfer Goal 1, PT)  contact guard assist  -CD     Time Frame (Transfer Goal 1, PT)  long term goal (LTG);2 weeks  -     Row Name 11/18/18 0926          Gait Training Goal 1 (PT)    Activity/Assistive Device (Gait Training Goal 1, PT)  gait (walking locomotion);assistive device use;walker, rolling  -CD     [place] Level (Gait Training Goal 1, PT)  contact guard assist  -CD     Distance (Gait Goal 1, PT)  250  -CD     Time Frame (Gait Training Goal 1, PT)  long term goal (LTG);2 weeks  -CD     Row Name 11/18/18 0926          Patient Education Goal (PT)    Activity (Patient Education Goal, PT)  HEP   -CD     [place]/Cues/Accuracy (Memory Goal 2, PT)  demonstrates adequately;verbalizes understanding PT/FAMILY  -CD     Row Name 11/18/18 0926          Positioning and Restraints    Pre-Treatment Position  in bed  -CD     Post Treatment Position  chair  -CD     In Chair  reclined;call light within reach;encouraged to call for assist;exit alarm on;notified nsg;legs elevated;LUE elevated HEEL PROTECTORS.   -CD       User Key  (r) = Recorded By, (t) = Taken By, (c) = Cosigned By    Initials Name Provider Type    CD Tresa Camacho PT Physical Therapist        Physical Therapy Education     Title: PT OT SLP Therapies (Active)     Topic: Physical Therapy (Done)     Point: Mobility training (Done)     Learning Progress Summary           Patient Acceptance, E, VU,NR by CD at 11/18/2018 10:26 AM    Comment:  BENEFITS OF OOB ACTIVITY, SAFETY WITH MOBILITY, PROGRESSION OF POC.                   Point: Home exercise program (Done)     Learning Progress Summary           Patient Acceptance, E, VU,NR by CD at 11/18/2018 10:26 AM    Comment:  BENEFITS OF OOB ACTIVITY, SAFETY WITH MOBILITY, PROGRESSION OF POC.                   Point: Body mechanics (Done)     Learning Progress Summary           Patient Acceptance, E, VU,NR by CD at 11/18/2018 10:26 AM    Comment:  BENEFITS OF OOB ACTIVITY, SAFETY WITH MOBILITY, PROGRESSION OF POC.                   Point: Precautions (Done)     Learning Progress Summary           Patient Acceptance, E, VU,NR by CD at 11/18/2018 10:26 AM    Comment:  BENEFITS OF OOB ACTIVITY, SAFETY WITH MOBILITY, PROGRESSION OF POC.                               User Key     Initials Effective Dates Name Provider Type Discipline    CD 06/19/15 -  Tresa Camacho PT Physical Therapist PT              PT Recommendation and Plan  Anticipated Discharge Disposition (PT): skilled nursing facility  Planned Therapy Interventions (PT Eval): balance training, bed mobility training, gait training, home exercise program,  patient/family education, transfer training, strengthening  Therapy Frequency (PT Clinical Impression): daily  Outcome Summary/Treatment Plan (PT)  Anticipated Discharge Disposition (PT): skilled nursing facility  Plan of Care Reviewed With: patient  Outcome Summary: PT PRESENTS WITH EVOLVING SYMPTOMS TO INCLUDE CONFUSION, IMPAIRED BALANCE, GENERALIZED WEAKNESS AND DECLINE IN FUNCTIONAL MOBILITY. PT AMBULATED 20 FEET WITH R WALKER AND MOD ASSIST. RECOMMEND SNF AT D/C.   Outcome Measures     Row Name 11/18/18 0926 11/17/18 1030          How much help from another person do you currently need...    Turning from your back to your side while in flat bed without using bedrails?  2  -CD  --     Moving from lying on back to sitting on the side of a flat bed without bedrails?  2  -CD  --     Moving to and from a bed to a chair (including a wheelchair)?  2  -CD  --     Standing up from a chair using your arms (e.g., wheelchair, bedside chair)?  2  -CD  --     Climbing 3-5 steps with a railing?  1  -CD  --     To walk in hospital room?  2  -CD  --     AM-PAC 6 Clicks Score  11  -CD  --        How much help from another is currently needed...    Putting on and taking off regular lower body clothing?  --  1  -MM     Bathing (including washing, rinsing, and drying)  --  1  -MM     Toileting (which includes using toilet bed pan or urinal)  --  1  -MM     Putting on and taking off regular upper body clothing  --  1  -MM     Taking care of personal grooming (such as brushing teeth)  --  2  -MM     Eating meals  --  2  -MM     Score  --  8  -MM        Modified [place] Scale    Modified [place] Scale  4 - Moderately severe disability.  Unable to walk without assistance, and unable to attend to own bodily needs without assistance.  -CD  5 - Severe disability.  Bedridden, incontinent, and requiring constant nursing care and attention.  -MM        Functional Assessment    Outcome Measure Options  AM-PAC 6 Clicks Basic Mobility  (PT);Modified [place]  -CD  AM-PAC 6 Clicks Daily Activity (OT);Modified [place]  -MM       User Key  (r) = Recorded By, (t) = Taken By, (c) = Cosigned By    Initials Name Provider Type    CD Tresa Camacho, PT Physical Therapist    Lacy Cash, OT Occupational Therapist         Time Calculation:   PT Charges     Row Name 11/18/18 0926             Time Calculation    Start Time  0926  -CD      PT Received On  11/18/18  -CD      PT Goal Re-Cert Due Date  11/28/18  -CD         Time Calculation- PT    Total Timed Code Minutes- PT  15 minute(s)  -CD         Timed Charges    24352 - Gait Training Minutes   15  -CD        User Key  (r) = Recorded By, (t) = Taken By, (c) = Cosigned By    Initials Name Provider Type    CD Tresa Camacho, PT Physical Therapist        Therapy Suggested Charges     Code   Minutes Charges    20592 (CPT®) Hc Pt Neuromusc Re Education Ea 15 Min      18345 (CPT®) Hc Pt Ther Proc Ea 15 Min      54584 (CPT®) Hc Gait Training Ea 15 Min 15 1    81193 (CPT®) Hc Pt Therapeutic Act Ea 15 Min      50481 (CPT®) Hc Pt Manual Therapy Ea 15 Min      40634 (CPT®) Hc Pt Iontophoresis Ea 15 Min      31809 (CPT®) Hc Pt Elec Stim Ea-Per 15 Min      39013 (CPT®) Hc Pt Ultrasound Ea 15 Min      21239 (CPT®) Hc Pt Self Care/Mgmt/Train Ea 15 Min      32860 (CPT®) Hc Pt Prosthetic (S) Train Initial Encounter, Each 15 Min      48433 (CPT®) Hc Pt Orthotic(S)/Prosthetic(S) Encounter, Each 15 Min      94293 (CPT®) Hc Orthotic(S) Mgmt/Train Initial Encounter, Each 15min      Total  15 1        Therapy Charges for Today     Code Description Service Date Service Provider Modifiers Qty    33588335703 HC GAIT TRAINING EA 15 MIN 11/18/2018 Tresa Camacho, PT GP 1    24938478970 HC PT EVAL MOD COMPLEXITY 4 11/18/2018 Tresa Camacho, PT GP 1          PT G-Codes  Outcome Measure Options: AM-PAC 6 Clicks Basic Mobility (PT), Modified [place]  AM-PAC 6 Clicks Score: 11  Score: 8  Modified [place] Scale: 4 - Moderately  severe disability.  Unable to walk without assistance, and unable to attend to own bodily needs without assistance.      Tresa Camacho, PT  11/18/2018

## 2018-11-18 NOTE — PROGRESS NOTES
[facility] Medicine Services  PROGRESS NOTE    Patient Name: Val Hill  : 1930  MRN: [id number]    Date of Admission: 2018  Length of Stay: 2  Primary Care Physician: Provider, No Known    Subjective   Subjective     CC: F/U AMS.    HPI: Patient lying awake in bed. Son at bedside. Patient herself without complaint. Much more alert than yesterday. Son states that she is still not back to baseline.    Review of Systems  Gen- No fevers, chills  CV- No chest pain, palpitations  Resp- No cough, dyspnea  GI- No N/V/D, abd pain    Otherwise ROS is negative except as mentioned in the HPI.    Objective   Objective     Vital Signs:   Temp:  [97.4 °F (36.3 °C)-97.9 °F (36.6 °C)] 97.5 °F (36.4 °C)  Heart Rate:  [67-90] 67  Resp:  [16-18] 18  BP: (134-178)/(67-89) 134/67  Total (NIH Stroke Scale): 7     Physical Exam:  Constitutional: No acute distress, awake, alert, looks better  HENT: NCAT, mucous membranes moist  Respiratory: Clear to auscultation bilaterally, respiratory effort normal   Cardiovascular: RRR, no murmurs, rubs, or gallops, palpable pedal pulses bilaterally  Gastrointestinal: Positive bowel sounds, soft, nontender, nondistended  Musculoskeletal: No bilateral ankle edema  Psychiatric: Appropriate affect, cooperative  Neurologic: Oriented to person, place. Moves extremities spontaneously.  Skin: No rashes    Results Reviewed:  I have personally reviewed current lab, radiology, and data and agree.    Results from last 7 days   Lab Units  18   0601  18   1708   WBC 10*3/mm3  22.11*  8.43   HEMOGLOBIN g/dL  11.9  12.7   HEMATOCRIT %  37.5  39.6   PLATELETS 10*3/mm3  307  346     Results from last 7 days   Lab Units  18   0601  18   1708   SODIUM mmol/L  136  139   POTASSIUM mmol/L  3.9  3.9   CHLORIDE mmol/L  102  103   CO2 mmol/L  25.0  27.0   BUN mg/dL  15  13   CREATININE mg/dL  0.81  0.71   GLUCOSE mg/dL  176*  120*   CALCIUM mg/dL  9.2  10.2    ALT (SGPT) U/L   --   13   AST (SGOT) U/L   --   15     Estimated Creatinine Clearance: 46.3 mL/min (by C-G formula based on SCr of 0.81 mg/dL).  No results found for: BNP    Microbiology Results Abnormal     Procedure Component Value - Date/Time    Urine Culture - Urine, Urine, Clean Catch [[id number]]  (Abnormal) Collected:  11/16/18 1654    Lab Status:  Preliminary result Specimen:  Urine, Clean Catch Updated:  11/17/18 1237     Urine Culture >100,000 CFU/mL Gram Negative Bacilli        CXR personally reviewed without acute chest disease.   Imaging Results (last 24 hours)     Procedure Component Value Units Date/Time    XR Chest 1 View [[id number]] Collected:  11/16/18 1728     Updated:  11/17/18 1110    Narrative:          EXAMINATION: XR CHEST 1 VW - 11/16/2018     INDICATION: Weakness, dizziness, altered mental status.     COMPARISON: 10/25/2016.     FINDINGS: Portable chest reveals cardiac and mediastinal silhouettes to  be within normal limits. Underlying chronic interstitial changes are  seen throughout the lung fields bilaterally. No focal parenchymal  opacification is present. No pleural effusion or pneumothorax.  Degenerative change is seen within the spine. Generator overlying the  right hilar region.           Impression:       Chronic interstitial changes seen throughout the lung fields  bilaterally. No superimposed infectious process.     DICTATED:   11/16/2018  EDITED/ls :   11/16/2018      This report was finalized on 11/17/2018 11:08 AM by Dr. Paula Crow MD.           Echocardiogram and carotid reports are essentially unremarkable. Agree with report.    Results for orders placed during the hospital encounter of 11/16/18   Adult Transthoracic Echo Complete W/ Cont if Necessary Per Protocol (With Agitated Saline)    Narrative · Left ventricular systolic function is normal. Estimated EF = 60%.  · Left ventricular diastolic dysfunction (grade I) consistent with   impaired relaxation.  · Mild  mitral valve regurgitation is present  · Mild tricuspid valve regurgitation is present.  · Calculated right ventricular systolic pressure from tricuspid   regurgitation is 32 mmHg.  · Negative saline bubble study.          I have reviewed the medications.      aspirin 325 mg Oral Daily   Or      aspirin 300 mg Rectal Daily   atorvastatin 40 mg Oral Nightly   ceftriaxone 1 g Intravenous Q24H   donepezil 5 mg Oral Daily   FLUoxetine 20 mg Oral Daily   insulin lispro 0-7 Units Subcutaneous 4x Daily With Meals & Nightly   lisinopril 5 mg Oral Q24H   multivitamin with minerals 1 tablet Oral Daily   sodium chloride 3 mL Intravenous Q12H   sodium chloride 3 mL Intravenous Q12H   sucralfate 1 g Oral 4x Daily AC & at Bedtime   vitamin B-12 1,000 mcg Oral Daily         Assessment/Plan   Assessment / Plan     Active Hospital Problems    Diagnosis   • **Stroke (CMS/HCC)   • UTI (urinary tract infection)   • Metabolic encephalopathy   • Type 2 diabetes mellitus (CMS/HCC)   • Dementia   • Leukocytosis       Brief Hospital Course to date:  Val Hill is a 87 y.o. female with Alzheimer's Dementia presenting from home with encephalopathy due to UTI. Also found to have CVA on ED evaluation.    Assessment & Plan:  --Agree with neurology in that her AMS was likely due to UTI superimposed on her dementia. This is likely now exacerbated due to her change in environment. She seems better to me today. Long discussion with her son regarding this. Continue IV abx and re-orientation of patient.   --CVA eval completed. Neurology has seen. Continue asa, statin. Add lisinopril for bp control.  --DM fairly well controlled. Continue current SSI.  --PT/OT recs SNF. Her son would rather take her home, but at this time she is not at a mental/functional status where he is comfortable with this. As discussed with him will continue to watch her for another 24/48 hours before making decision but if she does not quickly improve anticipate she will  need short term rehab prior to returning home with him.  --Labs in am.    DVT Prophylaxis: Mechanical.    CODE STATUS:   Code Status and Medical Interventions:   Ordered at: 11/17/18 0324     Code Status:    CPR     Medical Interventions (Level of Support Prior to Arrest):    Full       Disposition: I expect the patient to be discharged home vs SNF in 2-3 days.      Electronically signed by Marily Yang II, DO, 11/18/18, 9:35 AM.

## 2018-11-18 NOTE — PROGRESS NOTES
"Val Hill    Subjective     CC: stroke, encephalopathy    History of Present Illness     Val Hill is admitted with stroke and encephalopathy. She denies any concerns or complaints this morning.      There have been no other changes in the patient's interval history since my consult note of 11/17/18.    I have reviewed and confirmed the past family, social and medical history as accurate on 11/18/18.     Review of Systems   Constitutional: Negative.    Respiratory: Negative.    Cardiovascular: Negative.    Gastrointestinal: Negative.    Genitourinary: Negative.        Objective     /88 (BP Location: Left arm, Patient Position: Lying)   Pulse 73   Temp 97.4 °F (36.3 °C) (Oral)   Resp 18   Ht 170.2 cm (67\")   Wt 60 kg (132 lb 4.4 oz)   SpO2 94%   BMI 20.72 kg/m²     Physical Exam   Psychiatric: Her speech is normal.        Neurologic Exam     Mental Status   Oriented to person.   Disoriented to place.   Disoriented to time.   Attention: normal.   Speech: speech is normal   Level of consciousness: drowsy  Normal comprehension.     Cranial Nerves   Cranial nerves II through XII intact.     Motor Exam   Muscle bulk: normal  Overall muscle tone: normalGrips and plantar-flexes symmetrically       Laboratory and radiological testing: cdb=505; S36=837; Carotid Dopplers-no critical stenosis; ECHO-no source for cardioembolism    Assessment/Plan       Val Hill is admitted with encephalopathy which is likely related to UTI superimposed on dementia. Her stroke may be an incidental finding. Her workup is complete for this and I think her current management is reasonable. I don't have additional recommendations at this time.    As part of this visit I reviewed prior lab results and reviewed records from the current hospitalization which is incorporated in the HPI.  "

## 2018-11-18 NOTE — PLAN OF CARE
Problem: Patient Care Overview  Goal: Plan of Care Review  Outcome: Ongoing (interventions implemented as appropriate)   11/18/18 1027   Coping/Psychosocial   Plan of Care Reviewed With patient   OTHER   Outcome Summary PT PRESENTS WITH EVOLVING SYMPTOMS TO INCLUDE CONFUSION, IMPAIRED BALANCE, GENERALIZED WEAKNESS AND DECLINE IN FUNCTIONAL MOBILITY. PT AMBULATED 20 FEET WITH R WALKER AND MOD ASSIST. RECOMMEND SNF AT D/C.

## 2018-11-18 NOTE — PLAN OF CARE
Problem: Patient Care Overview  Goal: Plan of Care Review  Outcome: Ongoing (interventions implemented as appropriate)   11/18/18 0557   Coping/Psychosocial   Plan of Care Reviewed With patient   Plan of Care Review   Progress improving   OTHER   Outcome Summary PATIENT MORE RESTLESS AND CONFUSED AT THE BEGINNING OF SHIFT, BUT THEN RESTED QUIETLY THROUGHOUT THE NIGHT. BEDALARM IN PLACE. PATIENT CONTINUES WITH IV ABT. VITAL SIGNS STABLE. NIH SCORE THIS SHIFT IS A 9.

## 2018-11-18 NOTE — PLAN OF CARE
Problem: Patient Care Overview  Goal: Plan of Care Review   11/18/18 3636   Coping/Psychosocial   Plan of Care Reviewed With patient   Plan of Care Review   Progress (Evaluation)     SLP evaluation completed. Will continue to address dysphagia and communication deficits. Please see note for further details and recommendations.    Summary: CSE re-eval and similar results compared to yesterday. Pt's cognitive status has not improved significantly. Pt continues to have difficulty managing solid items and is showing s/s of aspiration with thin liquids. Suspect cognition continues to have an impact on pt's swallowing performance, however if s/s of aspiration persist, instrumental assessment is recommended to further assess. Rec: Continue L2, NT, strict aspiration precautions, feed pt only when fully alert, make NPO if any decline in presentation. Meds crushed in puree. F/u for DT for possible upgrade vs ? instrumental evaluation and cog-com tx tomorrow. Thank you, SLP

## 2018-11-18 NOTE — THERAPY EVALUATION
Acute Care - Speech Language Pathology   Swallow Re-Evaluation [facility]   Clinical Swallow Evaluation       Patient Name: Val Hill  : 1930  MRN: [id number]  Today's Date: 2018  Onset of Illness/Injury or Date of Surgery: 18     Referring Physician: MD RAMIREZ      Admit Date: 2018    Visit Dx:     ICD-10-CM ICD-9-CM   1. Encephalopathy acute G93.40 348.30   2. Bacterial UTI N39.0 599.0    A49.9 041.9   3. Cerebral ischemia I67.82 437.1   4. Impaired mobility and ADLs Z74.09 799.89   5. Impaired functional mobility, balance, gait, and endurance Z74.09 V49.89   6. Dysphagia, unspecified type R13.10 787.20     Patient Active Problem List   Diagnosis   • Stroke (CMS/HCC)   • UTI (urinary tract infection)   • Metabolic encephalopathy   • Type 2 diabetes mellitus (CMS/HCC)   • Dementia   • Leukocytosis     Past Medical History:   Diagnosis Date   • Dementia    • Diabetes mellitus (CMS/HCC)    • Hyperlipidemia      Past Surgical History:   Procedure Laterality Date   • ELBOW PROCEDURE     • HYSTERECTOMY          SWALLOW EVALUATION (last 72 hours)      SLP Adult Swallow Evaluation     Row Name 18 1030 18 1100                Rehab Evaluation    Document Type  re-evaluation  -SG  evaluation  -SG       Subjective Information  complains of;weakness;fatigue  -SG  no complaints  -SG       Patient Observations  lethargic;cooperative  -SG  alert;cooperative  -SG       Patient/Family Observations  No family present  -SG  no family  -SG       Patient Effort  adequate  -SG  adequate  -SG       Symptoms Noted During/After Treatment  other (see comments) confusion and fatigue  -SG  --          General Information    Patient Profile Reviewed  yes  -SG  yes  -SG       Pertinent History Of Current Problem  see inital eval, re-eval completed to ensure no decline from yesterday  -SG  adm w/ r/o CVA, UTI cuperimposed on dementia, confusion and encephalopathy  -SG       Current Method of  Nutrition  pureed;nectar/syrup-thick liquids  -SG  NPO  -SG       Precautions/Limitations, Vision  WFL;for purposes of eval  -SG  WFL;for purposes of eval  -SG       Precautions/Limitations, Hearing  WFL;for purposes of eval  -SG  WFL;for purposes of eval  -SG       Prior Level of Function-Communication  other (see comments) unknown baseline, dementia per chart  -SG  other (see comments) unknown baseline, dementia per chart  -SG       Prior Level of Function-Swallowing  other (see comments) unknown baseline  -SG  other (see comments) unknwon baseline  -SG       Plans/Goals Discussed with  patient;agreed upon  -SG  patient;agreed upon  -SG       Barriers to Rehab  cognitive status  -SG  cognitive status  -SG       Patient's Goals for Discharge  patient did not state  -SG  return to PO diet  -SG          Pain Assessment    Additional Documentation  Pain Scale: Numbers Pre/Post-Treatment (Group)  -SG  --          Pain Scale: Numbers Pre/Post-Treatment    Pain Scale: Numbers, Pretreatment  0/10 - no pain  -SG  0/10 - no pain  -SG       Pain Scale: Numbers, Post-Treatment  0/10 - no pain  -SG  0/10 - no pain  -SG          Oral Motor and Function    Dentition Assessment  natural, present and adequate  -SG  natural, present and adequate  -SG       Secretion Management  WNL/WFL  -SG  WNL/WFL  -SG       Mucosal Quality  moist, healthy  -SG  moist, healthy  -SG       Gag Response  WFL  -SG  WFL  -SG       Volitional Swallow  weak  -SG  weak  -SG       Volitional Cough  weak  -SG  weak;reduced respiratory support  -SG          Oral Musculature and Cranial Nerve Assessment    Oral Motor General Assessment  generalized oral motor weakness  -SG  generalized oral motor weakness  -SG          General Eating/Swallowing Observations    Respiratory Support Currently in Use  room air  -SG  room air  -SG       Eating/Swallowing Skills  fed by SLP  -SG  fed by SLP  -SG       Positioning During Eating  upright 90 degree;upright in bed   -SG  upright 90 degree;upright in bed  -SG       Utensils Used  spoon;cup;straw  -SG  spoon;cup;straw  -SG       Consistencies Trialed  regular textures;pureed;thin liquids  -SG  regular textures;pureed;thin liquids  -SG          Respiratory    Respiratory Status  WFL  -SG  --          Clinical Swallow Eval    Oral Prep Phase  impaired  -SG  impaired  -SG       Oral Transit  WFL  -SG  WFL  -SG       Oral Residue  impaired  -SG  impaired  -SG       Pharyngeal Phase  suspected pharyngeal impairment  -SG  suspected pharyngeal impairment  -SG       Clinical Swallow Evaluation Summary  CSE re-eval and similar results compared to yesterday. Pt's cognitive status has not improved significantly. Pt continues to have difficulty managing solid items and is showing s/s of aspiration with thin liquids. Suspect cognition continues to have an impact on pt's swallowing performance, however if s/s of aspiration persist, instrumental assessment is recommended to further assess. Rec: Continue L2, NT, strict aspiration precautions, feed pt only when fully alert, make NPO if any decline in presentation. Meds crushed in puree. F/u for DT for possible upgrade vs ? instrumental evaluation and cog-com tx tomorrow. Thank you, SLP   -SG  CSE completed this date. Per neuro note, pt w/ UTI superimposed on dementia, confusion and encephalopathy noted. Cog-com eval completed and basic cog-com goals added. During dysphagia eval, pt noted to have inc'd prep time and oral residue w/ solid item. Wet vocal quality w/ thins, improved with NT. RN reports pt coughed heavily w/ water trial during dysphagia screening. Rec: L2, NT, strict asp precautions, feed pt only when alert, make NPO if any concerns. Meds crushed in puree, re-eval swallowing tomorrow.   -SG          Oral Prep Concerns    Oral Prep Concerns  prolonged mastication;inefficient mastication  -SG  prolonged mastication;inefficient mastication  -SG       Prolonged Mastication  regular  consistencies  -SG  regular consistencies  -SG       Inefficient Mastication  regular consistencies  -SG  regular consistencies  -SG          Oral Residue Concerns    Oral Residue Concerns  sulci residue, bilateral  -SG  sulci residue, bilateral  -SG       Sulci Residue, Bilateral  regular consistencies  -SG  regular consistencies  -SG          Pharyngeal Phase Concerns    Pharyngeal Phase Concerns  cough;wet vocal quality;throat clear  -SG  wet vocal quality  -SG       Wet Vocal Quality  thin  -SG  thin  -SG       Cough  thin  -SG  --       Throat Clear  thin  -SG  --          Clinical Impression    SLP Swallowing Diagnosis  suspected pharyngeal dysfunction  -SG  suspected pharyngeal dysfunction  -SG       Functional Impact  risk of aspiration/pneumonia  -SG  risk of aspiration/pneumonia  -SG       Rehab Potential/Prognosis, Swallowing  good, to achieve stated therapy goals  -SG  good, to achieve stated therapy goals  -SG       Swallow Criteria for Skilled Therapeutic Interventions Met  demonstrates skilled criteria  -SG  demonstrates skilled criteria  -SG          Recommendations    Therapy Frequency (Swallow)  5 days per week  -SG  PRN  -SG       SLP Diet Recommendation  puree;nectar thick liquids  -SG  puree;nectar thick liquids  -SG       Recommended Diagnostics  other (see comments) DT f/u to determine if instrumental is indicated  -SG  reassess via clinical swallow evaluation  -SG       Recommended Precautions and Strategies  upright posture during/after eating;small bites of food and sips of liquid  -SG  upright posture during/after eating;small bites of food and sips of liquid  -SG       SLP Rec. for Method of Medication Administration  meds crushed;with pudding or applesauce  -SG  meds crushed;with pudding or applesauce  -SG       Monitor for Signs of Aspiration  yes;notify SLP if any concerns  -SG  yes;notify SLP if any concerns  -SG       Anticipated Dischage Disposition  skilled nursing  facility;anticipate therapy at next level of care  -SG  skilled nursing facility;anticipate therapy at next level of care  -         User Key  (r) = Recorded By, (t) = Taken By, (c) = Cosigned By    Initials Name Effective Dates    SG Andreina Sunitha Sahu, MS CCC-SLP 06/22/15 -           EDUCATION  The patient has been educated in the following areas:   Dysphagia (Swallowing Impairment) Oral Care/Hydration Modified Diet Instruction.    SLP Recommendation and Plan  SLP Swallowing Diagnosis: suspected pharyngeal dysfunction  SLP Diet Recommendation: puree, nectar thick liquids  Recommended Precautions and Strategies: upright posture during/after eating, small bites of food and sips of liquid     Monitor for Signs of Aspiration: yes, notify SLP if any concerns  Recommended Diagnostics: other (see comments)(DT f/u to determine if instrumental is indicated)  Swallow Criteria for Skilled Therapeutic Interventions Met: demonstrates skilled criteria  Anticipated Dischage Disposition: skilled nursing facility, anticipate therapy at next level of care  Rehab Potential/Prognosis, Swallowing: good, to achieve stated therapy goals  Therapy Frequency (Swallow): 5 days per week          Plan of Care Reviewed With: patient  Plan of Care Review  Plan of Care Reviewed With: patient  Progress: (Evaluation)    SLP GOALS     Row Name 11/18/18 1030 11/17/18 1100          Oral Nutrition/Hydration Goal 1 (SLP)    Oral Nutrition/Hydration Goal 1, SLP  LTG: Patient will demonstrate no s/s of aspiration with recommended diet texture levels with 100% accuracy and no cueing in the hopsital setting  -SG  --     Time Frame (Oral Nutrition/Hydration Goal 1, SLP)  by discharge  -SG  --     Progress/Outcomes (Oral Nutrition/Hydration Goal 1, SLP)  goal ongoing  -SG  --        Swallow Management Recall Goal 1 (SLP)    Activity (Swallow Management Recall Goal 1, SLP)  safe diet/liquid level;safe diet level/texture;other (see comments) Diet  tolerance of current diet levels  -SG  --     [place]/Accuracy (Swallow Management Recall Goal 1, SLP)  with moderate cues (50-74% accuracy)  -SG  --     Time Frame (Swallow Management Recall Goal 1, SLP)  short term goal (STG);by discharge  -SG  --     Progress/Outcomes (Swallow Management Recall Goal 1, SLP)  goal ongoing  -SG  --        Awareness of Basic Personal Information Goal 1 (SLP)    Improve Awareness of Basic Personal Information Goal 1 (SLP)  --  answering yes/no questions regarding personal/biographical information;naming self, family members;80%;with moderate cues (50-74%)  -SG     Time Frame (Awareness of Basic Personal Information Goal 1, SLP)  --  short term goal (STG);by discharge  -SG     Progress/Outcomes (Awareness of Basic Personal Information Goal 1, SLP)  --  goal ongoing  -SG        Attention Goal 1 (SLP)    Improve Attention by Goal 1 (SLP)  --  looking at speaker;attending to task;80%  -SG     Time Frame (Attention Goal 1, SLP)  --  short term goal (STG);by discharge  -SG     Progress/Outcomes (Attention Goal 1, SLP)  --  goal ongoing  -SG        Additional Goal 1 (SLP)    Additional Goal 1, SLP  --  LTG: Pt will demonstrate improved functional cognition in the hospital setting with 80% accuracy and moderate cueing to express wants and needs.   -SG     Time Frame (Additional Goal 1, SLP)  --  short term goal (STG);by discharge  -SG     Progress/Outcomes (Additional Goal 1, SLP)  --  goal ongoing  -SG       User Key  (r) = Recorded By, (t) = Taken By, (c) = Cosigned By    Initials Name Provider Type    SG Sunitha Hdz MS CCC-SLP Speech and Language Pathologist             Time Calculation:       Therapy Charges for Today     Code Description Service Date Service Provider Modifiers Qty    36229971165  ST EVAL SPEECH AND PROD W LANG  1 11/17/2018 Sunitha Hdz MS CCC-SLP GN 1    [id number]  ST EVAL ORAL PHARYNG SWALLOW 2 11/17/2018 Andreina  Sunitha Sahu, MS CCC-SLP GN 1    [phone] [facility] ORAL PHARYNG SWALLOW 4 11/18/2018 Sunitha Hdz, MS CCC-SLP GN 1               Sunitha Hdz, MS ROSENDO-SLP  11/18/2018

## 2018-11-19 NOTE — PLAN OF CARE
"Problem: Skin Injury Risk (Adult)  Goal: Identify Related Risk Factors and Signs and Symptoms  Outcome: Ongoing (interventions implemented as appropriate)   11/18/18 1945   Skin Injury Risk (Adult)   Related Risk Factors (Skin Injury Risk) cognitive impairment;infection;mobility impaired;medical devices;moisture     Goal: Skin Health and Integrity  Outcome: Ongoing (interventions implemented as appropriate)   11/18/18 1945   Skin Injury Risk (Adult)   Skin Health and Integrity achieves outcome       Problem: Patient Care Overview  Goal: Plan of Care Review  Outcome: Ongoing (interventions implemented as appropriate)   11/18/18 1945   Coping/Psychosocial   Plan of Care Reviewed With patient   Plan of Care Review   Progress no change   OTHER   Outcome Summary VSS. Up to chair with assist x2. NIH \"7\". Incontinent, purewick in place at this time. Waffle mattress and pressure relief boots in place although pt refusing boots at times. Son at bedside this afternoon.        Problem: Fall Risk (Adult)  Goal: Identify Related Risk Factors and Signs and Symptoms  Outcome: Outcome(s) achieved Date Met: 11/18/18 11/18/18 1945   Fall Risk (Adult)   Related Risk Factors (Fall Risk) age-related changes;bladder function altered;confusion/agitation;fatigue/slow reaction;fear of falling;gait/mobility problems;history of falls;neuro disease/injury;slippery/uneven surfaces;environment unfamiliar   Signs and Symptoms (Fall Risk) presence of risk factors     Goal: Absence of Fall  Outcome: Ongoing (interventions implemented as appropriate)   11/18/18 1945   Fall Risk (Adult)   Absence of Fall achieves outcome         "

## 2018-11-19 NOTE — PROGRESS NOTES
"Val Hill    Subjective     CC: stroke, encephalopathy    History of Present Illness     Val Hill is admitted with stroke and encephalopathy. She again denies any concerns or complaints this morning. Her son feels she is improving cognitively.      There have been no other changes in the patient's interval history since my last progress note of 11/18/18.    I have reviewed and confirmed the past family, social and medical history as accurate on 11/18/18.     Review of Systems   Constitutional: Negative.        Objective     /73 (BP Location: Right arm, Patient Position: Lying)   Pulse 64   Temp 98 °F (36.7 °C) (Oral)   Resp 16   Ht 170.2 cm (67\")   Wt 60 kg (132 lb 4.4 oz)   SpO2 94%   BMI 20.72 kg/m²     Physical Exam   Psychiatric: Her speech is normal.        Neurologic Exam     Mental Status   Oriented to person.   Disoriented to place.   Disoriented to time.   Attention: normal.   Speech: speech is normal   Level of consciousness: drowsy  Normal comprehension.     Cranial Nerves   Cranial nerves II through XII intact.     Motor Exam   Muscle bulk: normal  Overall muscle tone: normalGrips symmetrically       Laboratory and radiological testing: S13=121; Carotid Dopplers-no critical stenosis; ECHO-no source for cardioembolism; BMP/CBC today unremarkable    Assessment/Plan       Val Hill is admitted with encephalopathy which is likely related to UTI superimposed on dementia. Her stroke may be an incidental finding. Her workup is complete for this and I think her current management is reasonable. I don't have additional recommendations at this time. I discussed this with her son.    As part of this visit I reviewed prior lab results and reviewed records from the current hospitalization which is incorporated in the HPI.  "

## 2018-11-19 NOTE — PROGRESS NOTES
Discharge Planning Assessment  [facility]     Patient Name: Val Hill  MRN: [id number]  Today's Date: 11/19/2018    Admit Date: 11/16/2018    Discharge Needs Assessment     Row Name 11/19/18 0912       Living Environment    Lives With  child(jeovany), adult    Current Living Arrangements  home/apartment/condo    Primary Care Provided by  child(jeovany)    Provides Primary Care For  no one, unable/limited ability to care for self    Family Caregiver if Needed  child(jeovany), adult    Quality of Family Relationships  involved;supportive    Able to Return to Prior Arrangements  no       Resource/Environmental Concerns    Resource/Environmental Concerns  none       Transition Planning    Patient/Family Anticipates Transition to  inpatient rehabilitation facility    Patient/Family Anticipated Services at Transition  rehabilitation services    Transportation Anticipated  family or friend will provide       Discharge Needs Assessment    Readmission Within the Last 30 Days  no previous admission in last 30 days    Concerns to be Addressed  discharge planning    Equipment Currently Used at Home  rollator;shower chair    Anticipated Changes Related to Illness  inability to care for self    Equipment Needed After Discharge  none    Discharge Facility/Level of Care Needs  nursing facility, skilled        Discharge Plan     Row Name 11/19/18 0914       Plan    Plan  SNF    Patient/Family in Agreement with Plan  yes    Plan Comments  Spoke with patient and her son in room to initiate discharge planning.  She lives with him in [facility].  Prior to admission, she was ambulating with the assist of a rolling walker.  She requires assistance for bathing and dressing.  She also has a shower chair at home.  She has private pay caregivers that come in for a few hours everyday through [employer].  Ms. Hill has RX coverage and has her scripts filled at [facility].  Therapy recommends SNF at discharge.  Patient prefers [facility].  Referral called to Marlen.   will continue to follow.  Sarah Cano RN [phone]    Final Discharge Disposition Code  03 - skilled nursing facility (SNF)        Destination      No service coordination in this encounter.      Durable Medical Equipment      No service coordination in this encounter.      Dialysis/Infusion      No service coordination in this encounter.      Home Medical Care      No service coordination in this encounter.      Community Resources      No service coordination in this encounter.        Expected Discharge Date and Time     Expected Discharge Date Expected Discharge Time    Nov 21, 2018         Demographic Summary     Row Name 11/19/18 0908       General Information    Admission Type  inpatient    Arrived From  emergency department    Referral Source  admission list    Reason for Consult  discharge planning    Preferred Language  English    General Information Comments  PCP- MD2U        Functional Status     Row Name 11/19/18 0911       Functional Status    Usual Activity Tolerance  moderate    Current Activity Tolerance  fair       Functional Status, IADL    Medications  assistive person    Meal Preparation  completely dependent    Housekeeping  completely dependent    Laundry  completely dependent    Shopping  completely dependent        Psychosocial    No documentation.       Abuse/Neglect    No documentation.       Legal     Row Name 11/19/18 0911       Financial/Legal    Finance Comments  Verified with patient's son that she has Medicare/[employer].  No issues obtaining medications.        Substance Abuse    No documentation.       Patient Forms    No documentation.           Sarah Cano RN

## 2018-11-19 NOTE — THERAPY TREATMENT NOTE
Acute Care - Occupational Therapy Treatment Note  [facility]     Patient Name: Val Hill  : 1930  MRN: [id number]  Today's Date: 2018  Onset of Illness/Injury or Date of Surgery: 18  Date of Referral to OT: 18  Referring Physician: MD RAMIREZ    Admit Date: 2018       ICD-10-CM ICD-9-CM   1. Encephalopathy acute G93.40 348.30   2. Bacterial UTI N39.0 599.0    A49.9 041.9   3. Cerebral ischemia I67.82 437.1   4. Impaired mobility and ADLs Z74.09 799.89   5. Impaired functional mobility, balance, gait, and endurance Z74.09 V49.89   6. Dysphagia, unspecified type R13.10 787.20     Patient Active Problem List   Diagnosis   • Stroke (CMS/HCC)   • UTI (urinary tract infection)   • Metabolic encephalopathy   • Type 2 diabetes mellitus (CMS/HCC)   • Dementia   • Leukocytosis     Past Medical History:   Diagnosis Date   • Dementia    • Diabetes mellitus (CMS/HCC)    • Hyperlipidemia      Past Surgical History:   Procedure Laterality Date   • ELBOW PROCEDURE     • HYSTERECTOMY         Therapy Treatment    Rehabilitation Treatment Summary     Row Name 18 1620             Treatment Time/Intention    Discipline  occupational therapist  -AR      Document Type  therapy note (daily note)  -AR      Subjective Information  no complaints  -AR      Mode of Treatment  occupational therapy  -AR      Patient/Family Observations  pt supine, no family at bedside  -AR      Patient Effort  adequate  -AR      Existing Precautions/Restrictions  fall confusion  -AR      Recorded by [AR] Kelly Falcon OT 18 3610      Row Name 18             Cognitive Assessment/Intervention    Additional Documentation  Cognitive Assessment/Intervention (Group)  -AR      Recorded by [AR] Kelly Falcon OT 18 1893      Row Name 18 162             Cognitive Assessment/Intervention- PT/OT    Affect/Mental Status (Cognitive)  confused  -AR      Orientation Status (Cognition)   oriented to;person;place  -AR      Follows Commands (Cognition)  follows one step commands;75-90% accuracy  -AR      Safety Deficit (Cognitive)  moderate deficit  -AR      Personal Safety Interventions  fall prevention program maintained  -AR      Recorded by [AR] Kelly Falcon OT 11/19/18 1743      Row Name 11/19/18 1620             Safety Issues, Functional Mobility    Safety Issues Affecting Function (Mobility)  ability to follow commands;judgment;problem solving;safety precaution awareness;safety precautions follow-through/compliance;awareness of need for assistance  -AR      Impairments Affecting Function (Mobility)  cognition;balance;range of motion (ROM);strength;endurance/activity tolerance  -AR      Recorded by [AR] Kelly Falcon OT 11/19/18 1743      Row Name 11/19/18 1620             Bed Mobility Assessment/Treatment    Rolling Left [place] (Bed Mobility)  verbal cues;maximum assist (25% patient effort)  -AR      Rolling Right [place] (Bed Mobility)  verbal cues;maximum assist (25% patient effort)  -AR      Scooting/Bridging [place] (Bed Mobility)  verbal cues;dependent (less than 25% patient effort);2 person assist  -AR      Supine-Sit [place] (Bed Mobility)  verbal cues;moderate assist (50% patient effort)  -AR      Sit-Supine [place] (Bed Mobility)  verbal cues;moderate assist (50% patient effort)  -AR      Bed Mobility, Safety Issues  cognitive deficits limit understanding;decreased use of arms for pushing/pulling;decreased use of legs for bridging/pushing;impaired trunk control for bed mobility  -AR      Recorded by [AR] Kelly Falcon OT 11/19/18 1743      Row Name 11/19/18 1620             Transfer Assessment/Treatment    Comment (Transfers)  pt declined  -AR      Recorded by [AR] Kelly Falcon OT 11/19/18 1743      Row Name 11/19/18 1620             ADL Assessment/Intervention    71066 - OT Self Care/Mgmt Minutes  28  -AR      BADL  Assessment/Intervention  upper body dressing;lower body dressing;grooming;feeding;toileting  -AR      Recorded by [AR] Kelly Falcon, OT 11/19/18 1743      Row Name 11/19/18 1620             Upper Body Dressing Assessment/Training    Upper Body Dressing [place] Level  doff;don;front opening garment;maximum assist (25% patient effort)  -AR      Upper Body Dressing Position  supine  -AR      Comment (Upper Body Dressing)  Pt had removed purewick catheter and gown wet. OT doffed soiled gown and needing cues for clothing orientation as she was attempting to reapply soiled gown after it was removed.   -AR      Recorded by [AR] Kelly Falcon, OT 11/19/18 1743      Row Name 11/19/18 1620             Lower Body Dressing Assessment/Training    Lower Body Dressing [place] Level  don;socks;maximum assist (25% patient effort)  -AR      Lower Body Dressing Position  supine  -AR      Recorded by [AR] Kelly Falcon, OT 11/19/18 1743      Row Name 11/19/18 1620             Grooming Assessment/Training    [place] Level (Grooming)  wash face, hands;maximum assist (25% patient effort)  -AR      Grooming Position  supine  -AR      Recorded by [AR] Kelly Falcon, OT 11/19/18 1743      Row Name 11/19/18 1620             Self-Feeding Assessment/Training    Comment (Feeding)  OT provided set-up assist, pt stated she was not hungry.   -AR      Recorded by [AR] Kelly Falcon, OT 11/19/18 1743      Row Name 11/19/18 1620             Toileting Assessment/Training    [place] Level (Toileting)  toileting skills;maximum assist (25% patient effort)  -AR      Toileting Position  supine  -AR      Recorded by [AR] Kelly Falcon, OT 11/19/18 1743      Row Name 11/19/18 1620             BADL Safety/Performance    Impairments, BADL Safety/Performance  balance;pain;range of motion;strength;cognition  -AR      Recorded by [AR] Kelly Falcon, OT 11/19/18 1743      Row Name 11/19/18 1620              Motor Skills Assessment/Interventions    Additional Documentation  Balance (Group);Balance Interventions (Group);Therapeutic Exercise (Group);Therapeutic Exercise Interventions (Group)  -AR      Recorded by [AR] Kelly Falcon OT 11/19/18 1743      Row Name 11/19/18 1620             Therapeutic Exercise    73579 - OT Therapeutic Activity Minutes  8  -AR      Recorded by [AR] Kelly Falcon OT 11/19/18 1743      Row Name 11/19/18 1620             Balance    Balance  static sitting balance;static standing balance  -AR      Recorded by [AR] Kelly Falcon OT 11/19/18 1743      Row Name 11/19/18 1620             Static Sitting Balance    Level of [place] (Unsupported Sitting, Static Balance)  contact guard assist  -AR      Sitting Position (Unsupported Sitting, Static Balance)  sitting on edge of bed  -AR      Time Able to Maintain Position (Unsupported Sitting, Static Balance)  more than 5 minutes  -AR      Recorded by [AR] Kelly Falcon OT 11/19/18 1743      Row Name 11/19/18 1620             Positioning and Restraints    Pre-Treatment Position  in bed  -AR      Post Treatment Position  bed  -AR      In Bed  notified nsg;supine;call light within reach;encouraged to call for assist;exit alarm on  -AR      Recorded by [AR] Kelly Falcon OT 11/19/18 1743      Row Name 11/19/18 1620             Pain Scale: FACES Pre/Post-Treatment    Pain: FACES Scale, Pretreatment  0-->no hurt  -AR      Pain: FACES Scale, Post-Treatment  0-->no hurt  -AR      Recorded by [AR] Kelly Falcon OT 11/19/18 1743      Row Name 11/19/18 1620             Plan of Care Review    Plan of Care Reviewed With  patient  -AR      Recorded by [AR] Kelly Falcon OT 11/19/18 1743      Row Name 11/19/18 1620             Outcome Summary/Treatment Plan (OT)    Daily Summary of Progress (OT)  progress towards functional goals is fair  -AR      Anticipated Discharge Disposition (OT)  skilled nursing facility   -AR      Recorded by [AR] Kelly Falcon Jessika, OT 11/19/18 3118        User Key  (r) = Recorded By, (t) = Taken By, (c) = Cosigned By    Initials Name Effective Dates Discipline    Kelly Calvillo, OT 06/22/15 -  OT           OT Rehab Goals     Row Name 11/19/18 1620             Bed Mobility Goal 1 (OT)    Progress/Outcomes (Bed Mobility Goal 1, OT)  goal ongoing  -AR         Transfer Goal 1 (OT)    Progress/Outcome (Transfer Goal 1, OT)  goal ongoing  -AR         Strength Goal 1 (OT)    Progress/Outcome (Strength Goal 1, OT)  goal ongoing  -AR        User Key  (r) = Recorded By, (t) = Taken By, (c) = Cosigned By    Initials Name Provider Type Discipline    AR Terrie Kelly Jessika, OT Occupational Therapist OT        Occupational Therapy Education     Title: PT OT SLP Therapies (Active)     Topic: Occupational Therapy (Active)     Point: ADL training (Active)     Description: Instruct learner(s) on proper safety adaptation and remediation techniques during self care or transfers.   Instruct in proper use of assistive devices.    Learning Progress Summary           Patient Acceptance, E,D, NR by AR at 11/19/2018  4:20 PM    Acceptance, E, NR,NL by LD at 11/18/2018 11:04 PM    Acceptance, E, NR by LD at 11/17/2018  9:03 PM    Acceptance, E,D, NR by MM at 11/17/2018 10:30 AM    Comment:  Educated on role of OT/process of IE, benefits of activity, BUE HEP, and precautions/improved mechanics for ADL, BUE therex and bed mobility. Question pt understanding due to decreased cognitive status/confusion this date.                   Point: Home exercise program (Active)     Description: Instruct learner(s) on appropriate technique for monitoring, assisting and/or progressing therapeutic exercises/activities.    Learning Progress Summary           Patient Acceptance, E, NR,NL by LD at 11/18/2018 11:04 PM    Acceptance, E, NR by LD at 11/17/2018  9:03 PM    Acceptance, E,D, NR by MM at 11/17/2018 10:30 AM    Comment:   Educated on role of OT/process of IE, benefits of activity, BUE HEP, and precautions/improved mechanics for ADL, BUE therex and bed mobility. Question pt understanding due to decreased cognitive status/confusion this date.                   Point: Precautions (Active)     Description: Instruct learner(s) on prescribed precautions during self-care and functional transfers.    Learning Progress Summary           Patient Acceptance, E,D, NR by AR at 11/19/2018  4:20 PM    Acceptance, E, NR,NL by LD at 11/18/2018 11:04 PM    Acceptance, E, NR by LD at 11/17/2018  9:03 PM    Acceptance, E,D, NR by MM at 11/17/2018 10:30 AM    Comment:  Educated on role of OT/process of IE, benefits of activity, BUE HEP, and precautions/improved mechanics for ADL, BUE therex and bed mobility. Question pt understanding due to decreased cognitive status/confusion this date.                   Point: Body mechanics (Active)     Description: Instruct learner(s) on proper positioning and spine alignment during self-care, functional mobility activities and/or exercises.    Learning Progress Summary           Patient Acceptance, E,D, NR by AR at 11/19/2018  4:20 PM    Acceptance, E, NR,NL by LD at 11/18/2018 11:04 PM    Acceptance, E, NR by LD at 11/17/2018  9:03 PM    Acceptance, E,D, NR by MM at 11/17/2018 10:30 AM    Comment:  Educated on role of OT/process of IE, benefits of activity, BUE HEP, and precautions/improved mechanics for ADL, BUE therex and bed mobility. Question pt understanding due to decreased cognitive status/confusion this date.                               User Key     Initials Effective Dates Name Provider Type Discipline     06/22/15 -  Lacy Russell, OT Occupational Therapist OT    AR 06/22/15 -  Kelly Falcon, OT Occupational Therapist OT    LD 07/11/18 -  Candace Ch RN Registered Nurse Nurse                OT Recommendation and Plan  Outcome Summary/Treatment Plan (OT)  Daily Summary of Progress  (OT): progress towards functional goals is fair  Anticipated Discharge Disposition (OT): skilled nursing facility  Daily Summary of Progress (OT): progress towards functional goals is fair  Plan of Care Review  Plan of Care Reviewed With: patient  Plan of Care Reviewed With: patient  Outcome Summary: Pt alert, completed bed mobility with max assist, UB ADLS with max assist and grooming with max assist. Pt confused, restless, demonstrates decreased balance and requires assist with all ADLs and mobility. Recommend SNF_level rehab.   Outcome Measures     Row Name 11/19/18 1620 11/18/18 0926 11/17/18 1030       How much help from another person do you currently need...    Turning from your back to your side while in flat bed without using bedrails?  --  2  -CD  --    Moving from lying on back to sitting on the side of a flat bed without bedrails?  --  2  -CD  --    Moving to and from a bed to a chair (including a wheelchair)?  --  2  -CD  --    Standing up from a chair using your arms (e.g., wheelchair, bedside chair)?  --  2  -CD  --    Climbing 3-5 steps with a railing?  --  1  -CD  --    To walk in hospital room?  --  2  -CD  --    AM-PAC 6 Clicks Score  --  11  -CD  --       How much help from another is currently needed...    Putting on and taking off regular lower body clothing?  1  -AR  --  1  -MM    Bathing (including washing, rinsing, and drying)  2  -AR  --  1  -MM    Toileting (which includes using toilet bed pan or urinal)  1  -AR  --  1  -MM    Putting on and taking off regular upper body clothing  2  -AR  --  1  -MM    Taking care of personal grooming (such as brushing teeth)  2  -AR  --  2  -MM    Eating meals  2  -AR  --  2  -MM    Score  10  -AR  --  8  -MM       Modified Alderson Scale    Modified Alderson Scale  --  4 - Moderately severe disability.  Unable to walk without assistance, and unable to attend to own bodily needs without assistance.  -CD  5 - Severe disability.  Bedridden, incontinent, and  requiring constant nursing care and attention.  -MM       Functional Assessment    Outcome Measure Options  AM-PAC 6 Clicks Daily Activity (OT)  -AR  AM-PAC 6 Clicks Basic Mobility (PT);Modified [place]  -CD  AM-PAC 6 Clicks Daily Activity (OT);Modified [place]  -MM      User Key  (r) = Recorded By, (t) = Taken By, (c) = Cosigned By    Initials Name Provider Type    Tresa Georges, PT Physical Therapist    MM Lacy Russell, OT Occupational Therapist    Kelly Calvillo, OT Occupational Therapist           Time Calculation:   Time Calculation- OT     Row Name 11/19/18 1620             Time Calculation- OT    OT Start Time  1620  -AR      Total Timed Code Minutes- OT  36 minute(s)  -AR      OT Received On  11/19/18  -AR      OT Goal Re-Cert Due Date  11/27/18  -AR         Timed Charges    13544 - OT Therapeutic Activity Minutes  8  -AR      85979 - OT Self Care/Mgmt Minutes  28  -AR        User Key  (r) = Recorded By, (t) = Taken By, (c) = Cosigned By    Initials Name Provider Type    Kelly Calvillo, OT Occupational Therapist           Therapy Suggested Charges     Code   Minutes Charges    44333 (CPT®) Hc Ot Neuromusc Re Education Ea 15 Min      97839 (CPT®) Hc Ot Ther Proc Ea 15 Min      30166 (CPT®) Hc Ot Therapeutic Act Ea 15 Min 8     64606 (CPT®) Hc Ot Manual Therapy Ea 15 Min      65762 (CPT®) Hc Ot Iontophoresis Ea 15 Min      66559 (CPT®) Hc Ot Elec Stim Ea-Per 15 Min      71446 (CPT®) Hc Ot Ultrasound Ea 15 Min      77304 (CPT®) Hc Ot Self Care/Mgmt/Train Ea 15 Min 28 2    Total  36 2        Therapy Charges for Today     Code Description Service Date Service Provider Modifiers Qty    [id number] HC OT SELF CARE/MGMT/TRAIN EA 15 MIN 11/19/2018 Kelly Falcon, OT GO 2               Kelly Falcon OT  11/19/2018

## 2018-11-19 NOTE — PROGRESS NOTES
[facility] Medicine Services  PROGRESS NOTE    Patient Name: Val Hill  : 1930  MRN: [id number]    Date of Admission: 2018  Length of Stay: 3  Primary Care Physician: Provider, No Known    Subjective   Subjective     CC: f/u AMS    HPI: Sitting up in bed without family in room. Patient without complaints this am. No concerns from nursing staff.    Review of Systems  Full ROS unobtainable due to mental status.    Objective   Objective     Vital Signs:   Temp:  [96.9 °F (36.1 °C)-98.4 °F (36.9 °C)] 98.1 °F (36.7 °C)  Heart Rate:  [64-72] 64  Resp:  [16-18] 17  BP: (141-171)/(73-86) 158/86  Total (NIH Stroke Scale): 7     Physical Exam:  Constitutional: No acute distress, awake, alert  HENT: NCAT, mucous membranes moist  Respiratory: Clear to auscultation bilaterally, respiratory effort normal   Cardiovascular: RRR, no murmurs, rubs, or gallops, palpable pedal pulses bilaterally  Gastrointestinal: Positive bowel sounds, soft, nontender, nondistended  Musculoskeletal: No bilateral ankle edema  Psychiatric: Appropriate affect, cooperative  Neurologic: Alert and interactive, oriented to person only, moving extremities.  Skin: No rashes    Results Reviewed:  I have personally reviewed current lab, radiology, and data and agree.    Results from last 7 days   Lab Units  18   0512  18   0601  18   1708   WBC 10*3/mm3  8.22  22.11*  8.43   HEMOGLOBIN g/dL  11.5  11.9  12.7   HEMATOCRIT %  35.7  37.5  39.6   PLATELETS 10*3/mm3  284  307  346     Results from last 7 days   Lab Units  18   0512  18   0601  18   1708   SODIUM mmol/L  140  136  139   POTASSIUM mmol/L  3.7  3.9  3.9   CHLORIDE mmol/L  104  102  103   CO2 mmol/L  28.0  25.0  27.0   BUN mg/dL  16  15  13   CREATININE mg/dL  0.66  0.81  0.71   GLUCOSE mg/dL  149*  176*  120*   CALCIUM mg/dL  9.0  9.2  10.2   ALT (SGPT) U/L   --    --   13   AST (SGOT) U/L   --    --   15     Estimated  Creatinine Clearance: 46.9 mL/min (by C-G formula based on SCr of 0.66 mg/dL).  No results found for: BNP    Microbiology Results Abnormal     Procedure Component Value - Date/Time    Urine Culture - Urine, Urine, Clean Catch [[id number]]  (Abnormal)  (Susceptibility) Collected:  11/16/18 3708    Lab Status:  Final result Specimen:  Urine, Clean Catch Updated:  11/18/18 1048     Urine Culture >100,000 CFU/mL Klebsiella oxytoca    Susceptibility      Klebsiella oxytoca     JOCELINE     Ampicillin Intermediate     Ampicillin + Sulbactam Susceptible     Aztreonam Susceptible     Cefepime Susceptible     Cefotaxime Susceptible     Ceftriaxone Susceptible     Cefuroxime sodium Susceptible     Cephalothin Susceptible     Ertapenem Susceptible     Gentamicin Susceptible     Levofloxacin Susceptible     Meropenem Susceptible     Nitrofurantoin Susceptible     Piperacillin + Tazobactam Susceptible     Tetracycline Resistant     Tobramycin Susceptible     Trimethoprim + Sulfamethoxazole Susceptible                          Imaging Results (last 24 hours)     ** No results found for the last 24 hours. **        Results for orders placed during the hospital encounter of 11/16/18   Adult Transthoracic Echo Complete W/ Cont if Necessary Per Protocol (With Agitated Saline)    Narrative · Left ventricular systolic function is normal. Estimated EF = 60%.  · Left ventricular diastolic dysfunction (grade I) consistent with   impaired relaxation.  · Mild mitral valve regurgitation is present  · Mild tricuspid valve regurgitation is present.  · Calculated right ventricular systolic pressure from tricuspid   regurgitation is 32 mmHg.  · Negative saline bubble study.          I have reviewed the medications.      aspirin 325 mg Oral Daily   Or      aspirin 300 mg Rectal Daily   atorvastatin 40 mg Oral Nightly   ceftriaxone 1 g Intravenous Q24H   donepezil 5 mg Oral Daily   FLUoxetine 20 mg Oral Daily   insulin lispro 0-7 Units Subcutaneous 4x  Daily With Meals & Nightly   lisinopril 5 mg Oral Q24H   multivitamin with minerals 1 tablet Oral Daily   sodium chloride 3 mL Intravenous Q12H   sodium chloride 3 mL Intravenous Q12H   sucralfate 1 g Oral 4x Daily AC & at Bedtime   vitamin B-12 1,000 mcg Oral Daily         Assessment/Plan   Assessment / Plan     Active Hospital Problems    Diagnosis   • **Stroke (CMS/HCC)   • UTI (urinary tract infection)   • Metabolic encephalopathy   • Type 2 diabetes mellitus (CMS/HCC)   • Dementia   • Leukocytosis       Brief Hospital Course to date:  Val Hill is a 87 y.o. female with Alzheimer's Dementia presenting from home with encephalopathy due to UTI. Also found to have CVA on ED evaluation.     Assessment & Plan:  --Agree with neurology in that her AMS was likely due to UTI superimposed on her dementia. This is likely now exacerbated due to her change in environment. Urine CX with Klebsiella. Conitnue IV rocephin. Plan for 7 days abx.  --CVA eval completed. Neurology has seen. Continue asa, statin. Increase lisinopril.   --DM fairly well controlled. Continue current SSI.  --PT/OT recs SNF. CM has discussed with son, agreeable to rehab. Referrals faxed today.  --Labs in am.     DVT Prophylaxis: Mechanical.    CODE STATUS:   Code Status and Medical Interventions:   Ordered at: 11/17/18 0324     Code Status:    CPR     Medical Interventions (Level of Support Prior to Arrest):    Full       Disposition: I expect the patient to be discharged to SNF in 1-2 days.      Electronically signed by Marily Yang II, DO, 11/19/18, 11:27 AM.

## 2018-11-19 NOTE — PLAN OF CARE
Problem: Patient Care Overview  Goal: Plan of Care Review  Outcome: Ongoing (interventions implemented as appropriate)   11/19/18 0780   Coping/Psychosocial   Plan of Care Reviewed With patient   Plan of Care Review   Progress no change   OTHER   Outcome Summary Pt alert, completed bed mobility with max assist, UB ADLS with max assist and grooming with max assist. Pt confused, restless, demonstrates decreased balance and requires assist with all ADLs and mobility. Recommend SNF_level rehab.

## 2018-11-19 NOTE — PLAN OF CARE
Problem: Patient Care Overview  Goal: Plan of Care Review  Outcome: Ongoing (interventions implemented as appropriate)   11/19/18 0704   Coping/Psychosocial   Plan of Care Reviewed With patient   Plan of Care Review   Progress no change   OTHER   Outcome Summary PATIENT RESTED QUIETLY THIS SHIFT. NO COMPLAINTS AT THIS TIME. VITAL SIGNS STABLE.

## 2018-11-19 NOTE — PLAN OF CARE
Problem: Stroke (Ischemic) (Adult)  Goal: Signs and Symptoms of Listed Potential Problems Will be Absent, Minimized or Managed (Stroke)  Outcome: Ongoing (interventions implemented as appropriate)   11/19/18 1700   Goal/Outcome Evaluation   Problems Assessed (Stroke (Ischemic)) eating/swallowing impairment;respiratory compromise;bladder/bowel dysfunction;cognitive impairment

## 2018-11-19 NOTE — PROGRESS NOTES
Continued Stay Note  [facility]     Patient Name: Val Hill  MRN: [id number]  Today's Date: 11/19/2018    Admit Date: 11/16/2018    Discharge Plan     Row Name 11/19/18 1247       Plan    Plan  [facility]    Patient/Family in Agreement with Plan  yes    Plan Comments  Plan is a skilled bed at [facility] tomorrow, 11/20.  Family to transport.  Discussed with nursing and patient's son.  CM will continue to follow.  Sarah Cano RN [phone]        Discharge Codes    No documentation.       Expected Discharge Date and Time     Expected Discharge Date Expected Discharge Time    Nov 21, 2018             Sarah Cano RN

## 2018-11-20 NOTE — DISCHARGE SUMMARY
[facility] Medicine Services  DISCHARGE SUMMARY    Patient Name: Val Hill  : 1930  MRN: [id number]    Date of Admission: 2018  Date of Discharge:  2018  Primary Care Physician: Provider, No Known    Consults     Date and Time Order Name Status Description    2018 0324 Inpatient Neurology Consult Stroke Completed         Hospital Course     Presenting Problem:   Stroke (CMS/[facility]) [I63.9]    Active Hospital Problems    Diagnosis Date Noted   • **Stroke (CMS/[facility]) [I63.9] 2018   • UTI (urinary tract infection) [N39.0] 2018   • Metabolic encephalopathy [G93.41] 2018   • Type 2 diabetes mellitus (CMS/[facility]) [E11.9] 2018   • Dementia [F03.90] 2018   • Leukocytosis [D72.829] 2018      Resolved Hospital Problems   No resolved problems to display.          Hospital Course:  Val Hill is a 87 y.o. female with a history of alzheimer's dementia who presented to [facility] from home due to confusion.  In the ED, she was found to have a UTI and evidence of a CVA.  She was admitted to hospital medicine services and started on IV rocephin.  She was seen in consultation by neurology due to her CT evidence of CVA.  Her lipitor dose was increased to 40 mg and she was continued on aspirin.  The patient's confusion has been exacerbated by her UTI.  She has improved on oral seroquel.  She has been seen by PT/OT, and they have recommended rehab.  She has been accepted to [facility] and will be transported there today by her family for further rehabilitation.  She will need to follow up with her PCP once discharged from rehab.  She will be transitioned to oral ceftin for another 4 days to complete a full 7 days.        Day of Discharge     HPI:   Resting comfortably in bed with son at bedside.  No overnight events.  Alert and pleasant this morning.      Review of Systems  Gen- No fevers, chills  CV- No chest pain, palpitations  Resp- No  cough, dyspnea  GI- No N/V/D, abd pain    Otherwise ROS is negative except as mentioned in the HPI.    Vital Signs:   Temp:  [97.3 °F (36.3 °C)-98 °F (36.7 °C)] 97.7 °F (36.5 °C)  Heart Rate:  [54-66] 62  Resp:  [18-22] 18  BP: (143-172)/(76-82) 159/77     Physical Exam:  Constitutional: No acute distress, awake, alert, resting in bed with son at bedside.    HENT: NCAT, mucous membranes moist  Respiratory: Clear to auscultation bilaterally, respiratory effort normal on room air  Cardiovascular: RRR, no murmurs, rubs, or gallops, palpable pedal pulses bilaterally  Gastrointestinal: Positive bowel sounds, soft, nontender, nondistended  Musculoskeletal: No bilateral ankle edema  Psychiatric: Appropriate affect, cooperative  Neurologic: Oriented x 3, strength symmetric in all extremities, Cranial Nerves grossly intact to confrontation, speech clear  Skin: No rashes to exposed skin, loose, fragile.     Pertinent  and/or Most Recent Results     Results from last 7 days   Lab Units  11/19/18   0512  11/17/18   0601  11/16/18   1708   WBC 10*3/mm3  8.22  22.11*  8.43   HEMOGLOBIN g/dL  11.5  11.9  12.7   HEMATOCRIT %  35.7  37.5  39.6   PLATELETS 10*3/mm3  284  307  346   SODIUM mmol/L  140  136  139   POTASSIUM mmol/L  3.7  3.9  3.9   CHLORIDE mmol/L  104  102  103   CO2 mmol/L  28.0  25.0  27.0   BUN mg/dL  16  15  13   CREATININE mg/dL  0.66  0.81  0.71   GLUCOSE mg/dL  149*  176*  120*   CALCIUM mg/dL  9.0  9.2  10.2     Results from last 7 days   Lab Units  11/16/18   1708   BILIRUBIN mg/dL  0.2*   ALK PHOS U/L  66   ALT (SGPT) U/L  13   AST (SGOT) U/L  15     Results from last 7 days   Lab Units  11/17/18   0601   CHOLESTEROL mg/dL  185  184   TRIGLYCERIDES mg/dL  62  61   HDL CHOL mg/dL  43  43     Results from last 7 days   Lab Units  11/17/18   0601   TSH mIU/mL  0.486   HEMOGLOBIN A1C %  6.70*  6.50*     Brief Urine Lab Results  (Last result in the past 365 days)      Color   Clarity   Blood   Leuk Est    Nitrite   Protein   CREAT   Urine HCG        11/16/18 1654 Dark Yellow Turbid Small (1+) Large (3+) Positive 30 mg/dL (1+)               Microbiology Results Abnormal     Procedure Component Value - Date/Time    Urine Culture - Urine, Urine, Clean Catch [[id number]]  (Abnormal)  (Susceptibility) Collected:  11/16/18 1654    Lab Status:  Final result Specimen:  Urine, Clean Catch Updated:  11/18/18 1048     Urine Culture >100,000 CFU/mL Klebsiella oxytoca    Susceptibility      Klebsiella oxytoca     JOCELINE     Ampicillin Intermediate     Ampicillin + Sulbactam Susceptible     Aztreonam Susceptible     Cefepime Susceptible     Cefotaxime Susceptible     Ceftriaxone Susceptible     Cefuroxime sodium Susceptible     Cephalothin Susceptible     Ertapenem Susceptible     Gentamicin Susceptible     Levofloxacin Susceptible     Meropenem Susceptible     Nitrofurantoin Susceptible     Piperacillin + Tazobactam Susceptible     Tetracycline Resistant     Tobramycin Susceptible     Trimethoprim + Sulfamethoxazole Susceptible                          Imaging Results (all)     Procedure Component Value Units Date/Time    XR Chest 1 View [[id number]] Collected:  11/16/18 1728     Updated:  11/17/18 1110    Narrative:          EXAMINATION: XR CHEST 1 VW - 11/16/2018     INDICATION: Weakness, dizziness, altered mental status.     COMPARISON: 10/25/2016.     FINDINGS: Portable chest reveals cardiac and mediastinal silhouettes to  be within normal limits. Underlying chronic interstitial changes are  seen throughout the lung fields bilaterally. No focal parenchymal  opacification is present. No pleural effusion or pneumothorax.  Degenerative change is seen within the spine. Generator overlying the  right hilar region.           Impression:       Chronic interstitial changes seen throughout the lung fields  bilaterally. No superimposed infectious process.     DICTATED:   11/16/2018  EDITED/ls :   11/16/2018      This report was finalized on  11/17/2018 11:08 AM by Dr. Paula Crow MD.       MRI Brain Without Contrast [[id number]] Collected:  11/16/18 1954     Updated:  11/16/18 2146    Narrative:       EXAM:    MR Head Without Contrast     EXAM DATE/TIME:    11/16/2018 7:54 PM     CLINICAL HISTORY:    87 years old, female; Bacterial infection, unspecified; Encephalopathy,   unspecified; Cerebral ischemia; Urinary tract infection, site not specified;   Signs and symptoms; Altered mental status/memory loss; Confusion or   disorientation; Patient HX: Altered mental status per son that started today.   Acute confusion per son no HX of surgery to head; Additional info: Stroke     TECHNIQUE:    MR of the head without contrast.     COMPARISON:    CT HEAD WO CONTRAST 11/16/2018 7:12 PM     FINDINGS:    Brain:  Small acute infarction in the left corona radiata/superior basal   ganglia. There are periventricular and subcortical areas of flair high signal   consistent with chronic small vessel ischemic disease. No evidence of   hemorrhage or mass effect.  The cortical sulci are enlarged consistent with   cerebral atrophy.    Ventricles:  The ventricles are mildly enlarged.    Bones/joints: Unremarkable.    Soft tissues: Normal.    Sinuses: Normal as visualized. No acute sinusitis.    Mastoid air cells: Normal as visualized. No mastoid effusion.    Orbits: Unremarkable.       Impression:       Small acute infarction in left corona radiata/superior basal ganglia.    THIS DOCUMENT HAS BEEN ELECTRONICALLY SIGNED BY ZARI WHITAKER MD    CT Head Without Contrast [[id number]] Collected:  11/16/18 1759     Updated:  11/16/18 1954    Addenda:        THIS REPORT CONTAINS FINDINGS THAT MAY BE CRITICAL TO PATIENT CARE. The   findings were verbally communicated via telephone conference with SAMEERA ROCHE   at 7:54 PM EST on 11/16/2018. The findings were acknowledged and   understood.    THIS DOCUMENT HAS BEEN ELECTRONICALLY SIGNED BY ZARI WHITAKER MD  Signed:  11/16/18  1954 by Thong Brown MD    Narrative:       EXAM:    CT Head Without Intravenous Contrast     EXAM DATE/TIME:    11/16/2018 5:59 PM     CLINICAL HISTORY:    87 years old, female; Signs and symptoms; Altered mental status/memory loss;   Additional info: Confusion/delirium, altered loc, unexplained     TECHNIQUE:    Axial computed tomography images of the head/brain without intravenous   contrast.    All CT scans at this facility use at least one of these dose optimization   techniques: automated exposure control; mA and/or kV adjustment per patient   size (includes targeted exams where dose is matched to clinical indication); or   iterative reconstruction.     COMPARISON:    CT HEAD WO CONTRAST 11/19/2016 1:40 AM     FINDINGS:    Brain:  1.4 cm focus of low density in the left superior basal ganglia There   are periventricular and subcortical areas of low attenuation consistent with   chronic small vessel ischemic disease. This may obscure small areas of   ischemia. No evidence of hemorrhage or mass effect. The cortical sulci are   enlarged consistent with cerebral atrophy.    Ventricles:  The ventricles are mildly enlarged consistent with volume loss.    Bones/joints: Normal. No acute fracture.    Sinuses: Normal as visualized. No acute sinusitis.    Mastoid air cells: Normal as visualized. No mastoid effusion.    Orbits:  The visualized orbits are unremarkable.    Soft tissues: Normal.       Impression:       Cerebral atrophy, chronic small vessel ischemic disease.     Likely superimposed acute ischemia noted in the left superior basal ganglia    THIS DOCUMENT HAS BEEN ELECTRONICALLY SIGNED BY THONG BROWN MD          Results for orders placed during the hospital encounter of 11/16/18   Bilateral Carotid Duplex    Narrative · No evidence of hemodynamically significant stenosis of bilateral carotid   arteries.  · Intimal thickening and minimal plaque is noted.          Results for orders placed during the  hospital encounter of 11/16/18   Bilateral Carotid Duplex    Narrative · No evidence of hemodynamically significant stenosis of bilateral carotid   arteries.  · Intimal thickening and minimal plaque is noted.          Results for orders placed during the hospital encounter of 11/16/18   Adult Transthoracic Echo Complete W/ Cont if Necessary Per Protocol (With Agitated Saline)    Narrative · Left ventricular systolic function is normal. Estimated EF = 60%.  · Left ventricular diastolic dysfunction (grade I) consistent with   impaired relaxation.  · Mild mitral valve regurgitation is present  · Mild tricuspid valve regurgitation is present.  · Calculated right ventricular systolic pressure from tricuspid   regurgitation is 32 mmHg.  · Negative saline bubble study.            Discharge Details        Discharge Medications      New Medications      Instructions Start Date   acetaminophen 325 MG tablet  Commonly known as:  TYLENOL   650 mg, Oral, Every 4 Hours PRN      cefuroxime 500 MG tablet  Commonly known as:  CEFTIN   500 mg, Oral, 2 Times Daily      lisinopril 10 MG tablet  Commonly known as:  PRINIVIL,ZESTRIL   10 mg, Oral, Every 24 Hours Scheduled      QUEtiapine 25 MG tablet  Commonly known as:  SEROquel   25 mg, Oral, Nightly         Changes to Medications      Instructions Start Date   atorvastatin 40 MG tablet  Commonly known as:  LIPITOR  What changed:    · medication strength  · how much to take  · when to take this   40 mg, Oral, Nightly         Continue These Medications      Instructions Start Date   ARICEPT PO   5 mg, Oral, Daily      aspirin 81 MG tablet   81 mg, Oral, Daily      cholecalciferol 1000 units tablet  Commonly known as:  VITAMIN D3   1,000 Units, Oral, Daily      METFORMIN HCL PO   850 mg, Oral, 2 Times Daily      MULTIVITAMIN ADULT PO   Oral      PROZAC PO   20 mg, Oral, Daily      sucralfate 1 g tablet  Commonly known as:  CARAFATE   1 g, Oral, 4 Times Daily Before Meals & Nightly       vitamin B-12 1000 MCG tablet  Commonly known as:  CYANOCOBALAMIN   1,000 mcg, Oral, Daily               Discharge Disposition:      Discharge Diet:  Diet Instructions     Diet: Dysphagia; Nectar / Syrup Thick Liquids; Pureed      Discharge Diet:  Dysphagia    Fluid Consistency:  Nectar / Syrup Thick Liquids    Pureed Options:  Pureed          Discharge Activity:   Activity Instructions     Activity as Tolerated            Code Status/Level of Support:  Code Status and Medical Interventions:   Ordered at: 11/17/18 0324     Code Status:    CPR     Medical Interventions (Level of Support Prior to Arrest):    Full       No future appointments.    Additional Instructions for the Follow-ups that You Need to Schedule     Discharge Follow-up with PCP   As directed       Currently Documented PCP:    Provider, No Known    PCP Phone Number:    [phone]     Follow Up Details:  first available hospital follow up appt.               Time Spent on Discharge:  40 minutes    Electronically signed by JOLYNN Corea, 11/20/18, 10:19 AM.

## 2018-11-20 NOTE — PLAN OF CARE
Problem: Patient Care Overview  Goal: Plan of Care Review  Outcome: Ongoing (interventions implemented as appropriate)   11/20/18 0538   Coping/Psychosocial   Plan of Care Reviewed With patient   OTHER   Outcome Summary pt.was agitated and tried to climb out of bed early this evening. assisted pt.back to bed. provided calm environment. Morning meds refused. VSS. waiting to dc today. continuously monitored.     Goal: Interprofessional Rounds/Family Conf  Outcome: Ongoing (interventions implemented as appropriate)   11/20/18 0538   Interdisciplinary Rounds/Family Conf   Participants

## 2018-11-20 NOTE — DISCHARGE PLACEMENT REQUEST
"Val Mercado (87 y.o. Female)     Sarah Cano, RN  [phone]    Date of Birth Social Security Number Address Home Phone MRN    12/23/1930  [id number]  [place] [id number] [phone] [id number]    [employer] Mar[employer] Status          None        Admission Date Admission Type Admitting Provider Attending Provider Department, Room/Bed    11/16/18 Emergency Stefan Chirinos MD Dossett, Lee M, MD [facility] 3F, S315/1    Discharge Date Discharge Disposition Discharge Destination         Skilled Nursing Facility (DC - External)              Attending Provider:  Stefan Chirinos MD    Allergies:  No Known Allergies    Isolation:  None   Infection:  None   Code Status:  CPR    Ht:  170.2 cm (67\")   Wt:  60 kg (132 lb 4.4 oz)    Admission Cmt:  None   Principal Problem:  Stroke (CMS/HCC) [I63.9]                 Active Insurance as of 11/16/2018     Primary Coverage     Payor Plan Insurance Group Employer/Plan Group    MEDICARE MEDICARE A & B      Payor Plan Address Payor Plan Phone Number Payor Plan Fax Number Effective Dates    [address] [phone]  12/1/1995 - None Entered    [facility] [id number]       Subscriber Name Subscriber Birth Date Member ID       VAL MERCADO 12/23/1930 [id number]           Secondary Coverage     Payor Plan Insurance Group Employer/Plan Group    [employer]CIAL [employer]       Payor Plan Address Payor Plan Phone Number Payor Plan Fax Number Effective Dates    [address] [address]   10/25/2016 - None Entered    [facility] [id number]       Subscriber Name Subscriber Birth Date Member ID       VAL MERCADO 12/23/1930 [id number]                 Emergency Contacts      (Rel.) Home Phone Work Phone Mobile Phone    Tapan Mercado (Son) [phone] -- --    CIELO BOYKIN (Friend) -- -- [phone]                 Discharge Summary      Opal Turpin APRN at 11/20/2018  9:32 AM              [facility] " Hospital Medicine Services  DISCHARGE SUMMARY    Patient Name: Val Hill  : 1930  MRN: [id number]    Date of Admission: 2018  Date of Discharge:  2018  Primary Care Physician: Provider, No Known    Consults     Date and Time Order Name Status Description    2018 0324 Inpatient Neurology Consult Stroke Completed         Hospital Course     Presenting Problem:   Stroke (CMS/[facility]) [I63.9]    Active Hospital Problems    Diagnosis Date Noted   • **Stroke (CMS/[facility]) [I63.9] 2018   • UTI (urinary tract infection) [N39.0] 2018   • Metabolic encephalopathy [G93.41] 2018   • Type 2 diabetes mellitus (CMS/[facility]) [E11.9] 2018   • Dementia [F03.90] 2018   • Leukocytosis [D72.829] 2018      Resolved Hospital Problems   No resolved problems to display.          Hospital Course:  Val Hill is a 87 y.o. female with a history of alzheimer's dementia who presented to [facility] from home due to confusion.  In the ED, she was found to have a UTI and evidence of a CVA.  She was admitted to hospital medicine services and started on IV rocephin.  She was seen in consultation by neurology due to her CT evidence of CVA.  Her lipitor dose was increased to 40 mg and she was continued on aspirin.  The patient's confusion has been exacerbated by her UTI.  She has improved on oral seroquel.  She has been seen by PT/OT, and they have recommended rehab.  She has been accepted to [facility] and will be transported there today by her family for further rehabilitation.  She will need to follow up with her PCP once discharged from rehab.  She will be transitioned to oral ceftin for another 4 days to complete a full 7 days.        Day of Discharge     HPI:   Resting comfortably in bed with son at bedside.  No overnight events.  Alert and pleasant this morning.      Review of Systems  Gen- No fevers, chills  CV- No chest pain, palpitations  Resp- No cough, dyspnea  GI- No  N/V/D, abd pain    Otherwise ROS is negative except as mentioned in the HPI.    Vital Signs:   Temp:  [97.3 °F (36.3 °C)-98 °F (36.7 °C)] 97.7 °F (36.5 °C)  Heart Rate:  [54-66] 62  Resp:  [18-22] 18  BP: (143-172)/(76-82) 159/77     Physical Exam:  Constitutional: No acute distress, awake, alert, resting in bed with son at bedside.    HENT: NCAT, mucous membranes moist  Respiratory: Clear to auscultation bilaterally, respiratory effort normal on room air  Cardiovascular: RRR, no murmurs, rubs, or gallops, palpable pedal pulses bilaterally  Gastrointestinal: Positive bowel sounds, soft, nontender, nondistended  Musculoskeletal: No bilateral ankle edema  Psychiatric: Appropriate affect, cooperative  Neurologic: Oriented x 3, strength symmetric in all extremities, Cranial Nerves grossly intact to confrontation, speech clear  Skin: No rashes to exposed skin, loose, fragile.     Pertinent  and/or Most Recent Results     Results from last 7 days   Lab Units  11/19/18   0512  11/17/18   0601  11/16/18   1708   WBC 10*3/mm3  8.22  22.11*  8.43   HEMOGLOBIN g/dL  11.5  11.9  12.7   HEMATOCRIT %  35.7  37.5  39.6   PLATELETS 10*3/mm3  284  307  346   SODIUM mmol/L  140  136  139   POTASSIUM mmol/L  3.7  3.9  3.9   CHLORIDE mmol/L  104  102  103   CO2 mmol/L  28.0  25.0  27.0   BUN mg/dL  16  15  13   CREATININE mg/dL  0.66  0.81  0.71   GLUCOSE mg/dL  149*  176*  120*   CALCIUM mg/dL  9.0  9.2  10.2     Results from last 7 days   Lab Units  11/16/18   1708   BILIRUBIN mg/dL  0.2*   ALK PHOS U/L  66   ALT (SGPT) U/L  13   AST (SGOT) U/L  15     Results from last 7 days   Lab Units  11/17/18   0601   CHOLESTEROL mg/dL  185  184   TRIGLYCERIDES mg/dL  62  61   HDL CHOL mg/dL  43  43     Results from last 7 days   Lab Units  11/17/18   0601   TSH mIU/mL  0.486   HEMOGLOBIN A1C %  6.70*  6.50*     Brief Urine Lab Results  (Last result in the past 365 days)      Color   Clarity   Blood   Leuk Est   Nitrite   Protein   CREAT    Urine HCG        11/16/18 1654 Dark Yellow Turbid Small (1+) Large (3+) Positive 30 mg/dL (1+)               Microbiology Results Abnormal     Procedure Component Value - Date/Time    Urine Culture - Urine, Urine, Clean Catch [[id number]]  (Abnormal)  (Susceptibility) Collected:  11/16/18 1654    Lab Status:  Final result Specimen:  Urine, Clean Catch Updated:  11/18/18 1048     Urine Culture >100,000 CFU/mL Klebsiella oxytoca    Susceptibility      Klebsiella oxytoca     JOCELINE     Ampicillin Intermediate     Ampicillin + Sulbactam Susceptible     Aztreonam Susceptible     Cefepime Susceptible     Cefotaxime Susceptible     Ceftriaxone Susceptible     Cefuroxime sodium Susceptible     Cephalothin Susceptible     Ertapenem Susceptible     Gentamicin Susceptible     Levofloxacin Susceptible     Meropenem Susceptible     Nitrofurantoin Susceptible     Piperacillin + Tazobactam Susceptible     Tetracycline Resistant     Tobramycin Susceptible     Trimethoprim + Sulfamethoxazole Susceptible                          Imaging Results (all)     Procedure Component Value Units Date/Time    XR Chest 1 View [[id number]] Collected:  11/16/18 1728     Updated:  11/17/18 1110    Narrative:          EXAMINATION: XR CHEST 1 VW - 11/16/2018     INDICATION: Weakness, dizziness, altered mental status.     COMPARISON: 10/25/2016.     FINDINGS: Portable chest reveals cardiac and mediastinal silhouettes to  be within normal limits. Underlying chronic interstitial changes are  seen throughout the lung fields bilaterally. No focal parenchymal  opacification is present. No pleural effusion or pneumothorax.  Degenerative change is seen within the spine. Generator overlying the  right hilar region.           Impression:       Chronic interstitial changes seen throughout the lung fields  bilaterally. No superimposed infectious process.     DICTATED:   11/16/2018  EDITED/ls :   11/16/2018      This report was finalized on 11/17/2018 11:08 AM by   Paula Crow MD.       MRI Brain Without Contrast [[id number]] Collected:  11/16/18 1954     Updated:  11/16/18 2146    Narrative:       EXAM:    MR Head Without Contrast     EXAM DATE/TIME:    11/16/2018 7:54 PM     CLINICAL HISTORY:    87 years old, female; Bacterial infection, unspecified; Encephalopathy,   unspecified; Cerebral ischemia; Urinary tract infection, site not specified;   Signs and symptoms; Altered mental status/memory loss; Confusion or   disorientation; Patient HX: Altered mental status per son that started today.   Acute confusion per son no HX of surgery to head; Additional info: Stroke     TECHNIQUE:    MR of the head without contrast.     COMPARISON:    CT HEAD WO CONTRAST 11/16/2018 7:12 PM     FINDINGS:    Brain:  Small acute infarction in the left corona radiata/superior basal   ganglia. There are periventricular and subcortical areas of flair high signal   consistent with chronic small vessel ischemic disease. No evidence of   hemorrhage or mass effect.  The cortical sulci are enlarged consistent with   cerebral atrophy.    Ventricles:  The ventricles are mildly enlarged.    Bones/joints: Unremarkable.    Soft tissues: Normal.    Sinuses: Normal as visualized. No acute sinusitis.    Mastoid air cells: Normal as visualized. No mastoid effusion.    Orbits: Unremarkable.       Impression:       Small acute infarction in left corona radiata/superior basal ganglia.    THIS DOCUMENT HAS BEEN ELECTRONICALLY SIGNED BY THONG BROWN MD    CT Head Without Contrast [[id number]] Collected:  11/16/18 1759     Updated:  11/16/18 1954    Addenda:        THIS REPORT CONTAINS FINDINGS THAT MAY BE CRITICAL TO PATIENT CARE. The   findings were verbally communicated via telephone conference with SAMEERA ROCHE   at 7:54 PM EST on 11/16/2018. The findings were acknowledged and   understood.    THIS DOCUMENT HAS BEEN ELECTRONICALLY SIGNED BY THONG BROWN MD  Signed:  11/16/18 1954 by Thong Brown MD     Narrative:       EXAM:    CT Head Without Intravenous Contrast     EXAM DATE/TIME:    11/16/2018 5:59 PM     CLINICAL HISTORY:    87 years old, female; Signs and symptoms; Altered mental status/memory loss;   Additional info: Confusion/delirium, altered loc, unexplained     TECHNIQUE:    Axial computed tomography images of the head/brain without intravenous   contrast.    All CT scans at this facility use at least one of these dose optimization   techniques: automated exposure control; mA and/or kV adjustment per patient   size (includes targeted exams where dose is matched to clinical indication); or   iterative reconstruction.     COMPARISON:    CT HEAD WO CONTRAST 11/19/2016 1:40 AM     FINDINGS:    Brain:  1.4 cm focus of low density in the left superior basal ganglia There   are periventricular and subcortical areas of low attenuation consistent with   chronic small vessel ischemic disease. This may obscure small areas of   ischemia. No evidence of hemorrhage or mass effect. The cortical sulci are   enlarged consistent with cerebral atrophy.    Ventricles:  The ventricles are mildly enlarged consistent with volume loss.    Bones/joints: Normal. No acute fracture.    Sinuses: Normal as visualized. No acute sinusitis.    Mastoid air cells: Normal as visualized. No mastoid effusion.    Orbits:  The visualized orbits are unremarkable.    Soft tissues: Normal.       Impression:       Cerebral atrophy, chronic small vessel ischemic disease.     Likely superimposed acute ischemia noted in the left superior basal ganglia    THIS DOCUMENT HAS BEEN ELECTRONICALLY SIGNED BY ZARI WHITAKER MD          Results for orders placed during the hospital encounter of 11/16/18   Bilateral Carotid Duplex    Narrative · No evidence of hemodynamically significant stenosis of bilateral carotid   arteries.  · Intimal thickening and minimal plaque is noted.          Results for orders placed during the hospital encounter of 11/16/18    Bilateral Carotid Duplex    Narrative · No evidence of hemodynamically significant stenosis of bilateral carotid   arteries.  · Intimal thickening and minimal plaque is noted.          Results for orders placed during the hospital encounter of 11/16/18   Adult Transthoracic Echo Complete W/ Cont if Necessary Per Protocol (With Agitated Saline)    Narrative · Left ventricular systolic function is normal. Estimated EF = 60%.  · Left ventricular diastolic dysfunction (grade I) consistent with   impaired relaxation.  · Mild mitral valve regurgitation is present  · Mild tricuspid valve regurgitation is present.  · Calculated right ventricular systolic pressure from tricuspid   regurgitation is 32 mmHg.  · Negative saline bubble study.            Discharge Details        Discharge Medications      New Medications      Instructions Start Date   acetaminophen 325 MG tablet  Commonly known as:  TYLENOL   650 mg, Oral, Every 4 Hours PRN      cefuroxime 500 MG tablet  Commonly known as:  CEFTIN   500 mg, Oral, 2 Times Daily      lisinopril 10 MG tablet  Commonly known as:  PRINIVIL,ZESTRIL   10 mg, Oral, Every 24 Hours Scheduled      QUEtiapine 25 MG tablet  Commonly known as:  SEROquel   25 mg, Oral, Nightly         Changes to Medications      Instructions Start Date   atorvastatin 40 MG tablet  Commonly known as:  LIPITOR  What changed:    · medication strength  · how much to take  · when to take this   40 mg, Oral, Nightly         Continue These Medications      Instructions Start Date   ARICEPT PO   5 mg, Oral, Daily      aspirin 81 MG tablet   81 mg, Oral, Daily      cholecalciferol 1000 units tablet  Commonly known as:  VITAMIN D3   1,000 Units, Oral, Daily      METFORMIN HCL PO   850 mg, Oral, 2 Times Daily      MULTIVITAMIN ADULT PO   Oral      PROZAC PO   20 mg, Oral, Daily      sucralfate 1 g tablet  Commonly known as:  CARAFATE   1 g, Oral, 4 Times Daily Before Meals & Nightly      vitamin B-12 1000 MCG  tablet  Commonly known as:  CYANOCOBALAMIN   1,000 mcg, Oral, Daily               Discharge Disposition:      Discharge Diet:  Diet Instructions     Diet: Dysphagia; Nectar / Syrup Thick Liquids; Pureed      Discharge Diet:  Dysphagia    Fluid Consistency:  Nectar / Syrup Thick Liquids    Pureed Options:  Pureed          Discharge Activity:   Activity Instructions     Activity as Tolerated            Code Status/Level of Support:  Code Status and Medical Interventions:   Ordered at: 11/17/18 0324     Code Status:    CPR     Medical Interventions (Level of Support Prior to Arrest):    Full       No future appointments.    Additional Instructions for the Follow-ups that You Need to Schedule     Discharge Follow-up with PCP   As directed       Currently Documented PCP:    Provider, No Known    PCP Phone Number:    [phone]     Follow Up Details:  first available hospital follow up appt.               Time Spent on Discharge:  40 minutes    Electronically signed by JOLYNN Corea, 11/20/18, 10:19 AM.        Electronically signed by Opal Turpin APRN at 11/20/2018 10:19 AM

## 2018-11-20 NOTE — THERAPY TREATMENT NOTE
Acute Care - Speech Language Pathology Treatment Note  [facility]     Patient Name: Val Hill  : 1930  MRN: [id number]  Today's Date: 2018         Admit Date: 2018    Visit Dx:      ICD-10-CM ICD-9-CM   1. Dysphagia, unspecified type R13.10 787.20   2. Encephalopathy acute G93.40 348.30   3. Bacterial UTI N39.0 599.0    A49.9 041.9   4. Cerebral ischemia I67.82 437.1   5. Impaired mobility and ADLs Z74.09 799.89   6. Impaired functional mobility, balance, gait, and endurance Z74.09 V49.89     Patient Active Problem List   Diagnosis   • Stroke (CMS/HCC)   • UTI (urinary tract infection)   • Metabolic encephalopathy   • Type 2 diabetes mellitus (CMS/HCC)   • Dementia   • Leukocytosis        Therapy Treatment  Rehabilitation Treatment Summary     Row Name 18             Treatment Time/Intention    Discipline  speech language pathologist  -SG      Document Type  therapy note (daily note)  -SG      Subjective Information  no complaints  -SG      Mode of Treatment  speech-language pathology  -SG      Therapy Frequency (Swallow)  5 days per week  -SG      Patient Effort  good  -SG      Recorded by [SG] Sunitha Hdz, MS CCC-SLP 18      Row Name 18             Outcome Summary/Treatment Plan (SLP)    Daily Summary of Progress (SLP)  progress toward functional goals is good  -SG      Barriers to Overall Progress (SLP)  Cognition, which is gradually improving  -SG      Plan for Continued Treatment (SLP)  Con't DT assessment and modifications at SNF following d/c. Con't current diet in IP.   -SG      Anticipated Dischage Disposition  skilled nursing facility;anticipate therapy at next level of care  -SG      Recorded by [SG] Sunitha Hdz, MS ROBBINS-SLP 18        User Key  (r) = Recorded By, (t) = Taken By, (c) = Cosigned By    Initials Name Effective Dates Discipline    SG Sunitha Hdz MS CCC-SLP 06/22/15 -  SLP           EDUCATION  The patient has been educated in the following areas:   Dysphagia (Swallowing Impairment) Oral Care/Hydration Modified Diet Instruction.    SLP Recommendation and Plan        Swallow Criteria for Skilled Therapeutic Interventions Met: demonstrates skilled criteria  Anticipated Dischage Disposition: skilled nursing facility, anticipate therapy at next level of care     Therapy Frequency (Swallow): 5 days per week          Plan of Care Reviewed With: patient  Plan of Care Review  Plan of Care Reviewed With: patient  Daily Summary of Progress (SLP): progress toward functional goals is good  Plan for Continued Treatment (SLP): Con't DT assessment and modifications at [facility] following d/c. Con't current diet in IP.   Progress: improving    SLP GOALS     Row Name 11/20/18 0900 11/18/18 1030 11/17/18 1100       Oral Nutrition/Hydration Goal 1 (SLP)    Oral Nutrition/Hydration Goal 1, SLP  LTG: Patient will demonstrate no s/s of aspiration with recommended diet texture levels with 100% accuracy and no cueing in the hopsital setting  -SG  LTG: Patient will demonstrate no s/s of aspiration with recommended diet texture levels with 100% accuracy and no cueing in the hopsital setting  -SG  --    Time Frame (Oral Nutrition/Hydration Goal 1, SLP)  by discharge  -SG  by discharge  -SG  --    Barriers (Oral Nutrition/Hydration Goal 1, SLP)  NO s/s with current diet recommentation of NT and L2, Con't DT assessment at [facility] following d/c.   -SG  --  --    Progress/Outcomes (Oral Nutrition/Hydration Goal 1, SLP)  goal met  -SG  goal ongoing  -SG  --       Swallow Management Recall Goal 1 (SLP)    Activity (Swallow Management Recall Goal 1, SLP)  safe diet/liquid level;safe diet level/texture;other (see comments)  -SG  safe diet/liquid level;safe diet level/texture;other (see comments) Diet tolerance of current diet levels  -SG  --    [place]/Accuracy (Swallow Management Recall Goal 1, SLP)  with moderate cues  (50-74% accuracy)  -SG  with moderate cues (50-74% accuracy)  -SG  --    Time Frame (Swallow Management Recall Goal 1, SLP)  short term goal (STG);by discharge  -SG  short term goal (STG);by discharge  -SG  --    Barriers (Swallow Management Recall Goal 1, SLP)  Intermittent wet VQ q/ large, uncontolled sips of thins via straw. No s/s w/ NT. Given pt's gradually improving cognition, continue current diet and resume DT after d/c to SNF.   -SG  --  --    Progress/Outcomes (Swallow Management Recall Goal 1, SLP)  goal met  -SG  goal ongoing  -SG  --       Awareness of Basic Personal Information Goal 1 (SLP)    Improve Awareness of Basic Personal Information Goal 1 (SLP)  answering yes/no questions regarding personal/biographical information;naming self, family members;80%;with moderate cues (50-74%)  -SG  --  answering yes/no questions regarding personal/biographical information;naming self, family members;80%;with moderate cues (50-74%)  -SG    Time Frame (Awareness of Basic Personal Information Goal 1, SLP)  short term goal (STG);by discharge  -SG  --  short term goal (STG);by discharge  -SG    Barriers (Awareness of Basic Personal Information Goal 1, SLP)  Confusion  -SG  --  --    Progress (Awareness of Basic Personal Information Goal 1, SLP)  60%;with moderate cues (50-74%)  -SG  --  --    Progress/Outcomes (Awareness of Basic Personal Information Goal 1, SLP)  continuing progress toward goal  -SG  --  goal ongoing  -SG       Attention Goal 1 (SLP)    Improve Attention by Goal 1 (SLP)  looking at speaker;attending to task;80%  -SG  --  looking at speaker;attending to task;80%  -SG    Time Frame (Attention Goal 1, SLP)  short term goal (STG);by discharge  -SG  --  short term goal (STG);by discharge  -SG    Barriers (Attention Goal 1, SLP)  Confusion  -SG  --  --    Progress (Attention Goal 1, SLP)  70%;with moderate cues (50-74%)  -SG  --  --    Progress/Outcomes (Attention Goal 1, SLP)  continuing progress toward  goal  -SG  --  goal ongoing  -SG       Additional Goal 1 (SLP)    Additional Goal 1, SLP  LTG: Pt will demonstrate improved functional cognition in the hospital setting with 80% accuracy and moderate cueing to express wants and needs.   -SG  --  LTG: Pt will demonstrate improved functional cognition in the hospital setting with 80% accuracy and moderate cueing to express wants and needs.   -SG    Time Frame (Additional Goal 1, SLP)  by discharge  -SG  --  short term goal (STG);by discharge  -SG    Barriers (Additional Goal 1, SLP)  Confusion/Cognition  -SG  --  --    Progress/Outcomes (Additional Goal 1, SLP)  good progress toward goal  -SG  --  goal ongoing  -SG      User Key  (r) = Recorded By, (t) = Taken By, (c) = Cosigned By    Initials Name Provider Type    Sunitha Wilson MS CCC-SLP Speech and Language Pathologist              Time Calculation:     Time Calculation- SLP     Row Name 18 1004             Time Calculation- SLP    SLP Start Time  0900  -SG      SLP Received On  18  -SG        User Key  (r) = Recorded By, (t) = Taken By, (c) = Cosigned By    Initials Name Provider Type    Sunitha Wilson MS CCC-SLP Speech and Language Pathologist          Therapy Charges for Today     Code Description Service Date Service Provider Modifiers Qty    02100827361 HC ST TREATMENT SWALLOW 2 2018 Sunitha Hdz MS CCC-SLP GN 1    32464996727 HC ST TREATMENT SPEECH 1 2018 Sunitha Hdz MS CCC-SLP GN 1                     Sunitha Hdz MS CCC-SLP  2018   and Acute Care - Speech Language Pathology   Swallow Treatment Note [facility]     Patient Name: Val Hill  : 1930  MRN: [id number]  Today's Date: 2018  Onset of Illness/Injury or Date of Surgery: 18     Referring Physician: MD RAMIREZ      Admit Date: 2018    Visit Dx:      ICD-10-CM ICD-9-CM   1. Dysphagia, unspecified type  R13.10 787.20   2. Encephalopathy acute G93.40 348.30   3. Bacterial UTI N39.0 599.0    A49.9 041.9   4. Cerebral ischemia I67.82 437.1   5. Impaired mobility and ADLs Z74.09 799.89   6. Impaired functional mobility, balance, gait, and endurance Z74.09 V49.89     Patient Active Problem List   Diagnosis   • Stroke (CMS/[facility])   • UTI (urinary tract infection)   • Metabolic encephalopathy   • Type 2 diabetes mellitus (CMS/HCC)   • Dementia   • Leukocytosis       Therapy Treatment  Rehabilitation Treatment Summary     Row Name 11/20/18 0900             Treatment Time/Intention    Discipline  speech language pathologist  -SG      Document Type  therapy note (daily note)  -SG      Subjective Information  no complaints  -SG      Mode of Treatment  speech-language pathology  -SG      Therapy Frequency (Swallow)  5 days per week  -SG      Patient Effort  good  -SG      Recorded by [SG] Sunitha Hdz, MS CCC-SLP 11/20/18 0957      Row Name 11/20/18 0900             Outcome Summary/Treatment Plan (SLP)    Daily Summary of Progress (SLP)  progress toward functional goals is good  -SG      Barriers to Overall Progress (SLP)  Cognition, which is gradually improving  -SG      Plan for Continued Treatment (SLP)  Con't DT assessment and modifications at SNF following d/c. Con't current diet in IP.   -SG      Anticipated Dischage Disposition  skilled nursing facility;anticipate therapy at next level of care  -SG      Recorded by [SG] Sunitha Hdz MS CCC-SLP 11/20/18 0957        User Key  (r) = Recorded By, (t) = Taken By, (c) = Cosigned By    Initials Name Effective Dates Discipline    SG Sunitha Hdz MS CCC-SLP 06/22/15 -  SLP          Outcome Summary  Outcome Summary/Treatment Plan (SLP)  Daily Summary of Progress (SLP): progress toward functional goals is good (11/20/18 0900 : Sunitha Hdz MS CCC-SLP)  Barriers to Overall Progress (SLP): Cognition, which is gradually  improving (11/20/18 0900 : Sunitha Hdz, MS CCC-SLP)  Plan for Continued Treatment (SLP): Con't DT assessment and modifications at SNF following d/c. Con't current diet in IP.  (11/20/18 0900 : Sunitha Hdz, MS CCC-SLP)  Anticipated Dischage Disposition: skilled nursing facility, anticipate therapy at next level of care (11/20/18 0900 : Sunitha Hdz, MS [facility]-SLP)      SLP GOALS     Row Name 11/20/18 0900 11/18/18 1030 11/17/18 1100       Oral Nutrition/Hydration Goal 1 (SLP)    Oral Nutrition/Hydration Goal 1, SLP  LTG: Patient will demonstrate no s/s of aspiration with recommended diet texture levels with 100% accuracy and no cueing in the hopsital setting  -SG  LTG: Patient will demonstrate no s/s of aspiration with recommended diet texture levels with 100% accuracy and no cueing in the hopsital setting  -SG  --    Time Frame (Oral Nutrition/Hydration Goal 1, SLP)  by discharge  -SG  by discharge  -SG  --    Barriers (Oral Nutrition/Hydration Goal 1, SLP)  NO s/s with current diet recommentation of NT and L2, Con't DT assessment at SNF following d/c.   -SG  --  --    Progress/Outcomes (Oral Nutrition/Hydration Goal 1, SLP)  goal met  -SG  goal ongoing  -SG  --       Swallow Management Recall Goal 1 (SLP)    Activity (Swallow Management Recall Goal 1, SLP)  safe diet/liquid level;safe diet level/texture;other (see comments)  -SG  safe diet/liquid level;safe diet level/texture;other (see comments) Diet tolerance of current diet levels  -SG  --    [place]/Accuracy (Swallow Management Recall Goal 1, SLP)  with moderate cues (50-74% accuracy)  -SG  with moderate cues (50-74% accuracy)  -SG  --    Time Frame (Swallow Management Recall Goal 1, SLP)  short term goal (STG);by discharge  -SG  short term goal (STG);by discharge  -SG  --    Barriers (Swallow Management Recall Goal 1, SLP)  Intermittent wet VQ q/ large, uncontolled sips of thins via straw. No s/s w/ NT. Given  pt's gradually improving cognition, continue current diet and resume DT after d/c to SNF.   -SG  --  --    Progress/Outcomes (Swallow Management Recall Goal 1, SLP)  goal met  -SG  goal ongoing  -SG  --       Awareness of Basic Personal Information Goal 1 (SLP)    Improve Awareness of Basic Personal Information Goal 1 (SLP)  answering yes/no questions regarding personal/biographical information;naming self, family members;80%;with moderate cues (50-74%)  -SG  --  answering yes/no questions regarding personal/biographical information;naming self, family members;80%;with moderate cues (50-74%)  -SG    Time Frame (Awareness of Basic Personal Information Goal 1, SLP)  short term goal (STG);by discharge  -SG  --  short term goal (STG);by discharge  -SG    Barriers (Awareness of Basic Personal Information Goal 1, SLP)  Confusion  -SG  --  --    Progress (Awareness of Basic Personal Information Goal 1, SLP)  60%;with moderate cues (50-74%)  -SG  --  --    Progress/Outcomes (Awareness of Basic Personal Information Goal 1, SLP)  continuing progress toward goal  -SG  --  goal ongoing  -SG       Attention Goal 1 (SLP)    Improve Attention by Goal 1 (SLP)  looking at speaker;attending to task;80%  -SG  --  looking at speaker;attending to task;80%  -SG    Time Frame (Attention Goal 1, SLP)  short term goal (STG);by discharge  -SG  --  short term goal (STG);by discharge  -SG    Barriers (Attention Goal 1, SLP)  Confusion  -SG  --  --    Progress (Attention Goal 1, SLP)  70%;with moderate cues (50-74%)  -SG  --  --    Progress/Outcomes (Attention Goal 1, SLP)  continuing progress toward goal  -SG  --  goal ongoing  -SG       Additional Goal 1 (SLP)    Additional Goal 1, SLP  LTG: Pt will demonstrate improved functional cognition in the hospital setting with 80% accuracy and moderate cueing to express wants and needs.   -SG  --  LTG: Pt will demonstrate improved functional cognition in the hospital setting with 80% accuracy and  moderate cueing to express wants and needs.   -SG    Time Frame (Additional Goal 1, SLP)  by discharge  -SG  --  short term goal (STG);by discharge  -SG    Barriers (Additional Goal 1, SLP)  Confusion/Cognition  -SG  --  --    Progress/Outcomes (Additional Goal 1, SLP)  good progress toward goal  -SG  --  goal ongoing  -SG      User Key  (r) = Recorded By, (t) = Taken By, (c) = Cosigned By    Initials Name Provider Type    Sunitha Wilson MS CCC-SLP Speech and Language Pathologist          EDUCATION  The patient has been educated in the following areas:   Cognitive Impairment Communication Impairment.    SLP Recommendation and Plan                       Anticipated Dischage Disposition: skilled nursing facility, anticipate therapy at next level of care     Therapy Frequency (Swallow): 5 days per week          Plan of Care Reviewed With: patient  Plan of Care Review  Plan of Care Reviewed With: patient  Daily Summary of Progress (SLP): progress toward functional goals is good  Plan for Continued Treatment (SLP): Con't DT assessment and modifications at SNF following d/c. Con't current diet in IP.   Progress: improving           Time Calculation:   Time Calculation- SLP     Row Name 11/20/18 1004             Time Calculation- SLP    SLP Start Time  0900  -SG      SLP Received On  11/20/18  -SG        User Key  (r) = Recorded By, (t) = Taken By, (c) = Cosigned By    Initials Name Provider Type    Sunitha Wilson MS CCC-SLP Speech and Language Pathologist          Therapy Charges for Today     Code Description Service Date Service Provider Modifiers Qty    71459438838 HC ST TREATMENT SWALLOW 2 11/20/2018 Sunitha Hdz MS CCC-SLP GN 1    34810497583 HC ST TREATMENT SPEECH 1 11/20/2018 Sunitha Hdz MS CCC-SLP GN 1                 MS BO Scott  11/20/2018

## 2018-11-20 NOTE — PROGRESS NOTES
Case Management Discharge Note    Final Note: Plan is a skilled bed at [facility] today, 11/20.  Family to transport.  Nurse to call report to [phone], ask for rehab to home unit.  CM will fax transfer summary when available to [phone].  Please place a copy of the transfer summary and any scripts in the packet to be sent to the facility with the patient.  Sarah Cano RN [phone]    Destination - Selection Complete      Service Provider Request Status Selected Services Address Phone Number Fax Number    [facility] Selected Skilled Nursing [address] [place], [place] [address]ON [facility] [id number] [phone]      Durable Medical Equipment      No service has been selected for the patient.      Dialysis/Infusion      No service has been selected for the patient.      Home Medical Care      No service has been selected for the patient.      Community Resources      No service has been selected for the patient.             Final Discharge Disposition Code: 03 - skilled nursing facility (SNF)

## 2018-11-20 NOTE — NURSING NOTE
Pt became increasingly confused and anxious after son's visit this afternoon. Patient attempted to exit the bed without assistance multiple times, looking for son and was not easily redirected.  MD on call paged, One time dose for ativan and nightly seroquel ordered.  Ativan given. Bed alarm and additional pad alarm in place.  Report given to oncoming nurse.

## 2018-11-20 NOTE — PROGRESS NOTES
"Val Hill    Subjective     CC: stroke, encephalopathy    History of Present Illness     Val Hill is admitted with stroke and encephalopathy. She again denies any concerns or complaints this morning. Her son feels she is improving cognitively.      There have been no other changes in the patient's interval history since my last progress note of 11/19/18.    I have reviewed and confirmed the past family, social and medical history as accurate on 11/18/18.     Review of Systems   Constitutional: Negative.    Respiratory: Negative.    Cardiovascular: Negative.    Gastrointestinal: Negative.    Genitourinary: Negative.        Objective     /77 (BP Location: Right arm, Patient Position: Lying)   Pulse 62   Temp 97.7 °F (36.5 °C) (Axillary)   Resp 18   Ht 170.2 cm (67\")   Wt 60 kg (132 lb 4.4 oz)   SpO2 94%   BMI 20.72 kg/m²     Physical Exam   Psychiatric: Her speech is normal.        Neurologic Exam     Mental Status   Oriented to person.   Oriented to place.   Disoriented to time. Oriented to month.   Attention: normal.   Speech: speech is normal   Level of consciousness: alert  Normal comprehension.     Cranial Nerves   Cranial nerves II through XII intact.     Motor Exam   Muscle bulk: normal  Overall muscle tone: normalGrips symmetrically       Laboratory and radiological testing: D98=118; Carotid Dopplers-no critical stenosis; ECHO-no source for cardioembolism; glucose=136    Assessment/Plan       Val Hill is admitted with encephalopathy which is likely related to UTI superimposed on dementia. Her stroke may be an incidental finding. Her workup is complete for this and I think her current management is reasonable. She has improved clinically and I agree with plans for discharge. This was again discussed with her son.     As part of this visit I reviewed prior lab results and reviewed records from the current hospitalization which is incorporated in the HPI.  "

## 2018-11-20 NOTE — PLAN OF CARE
Problem: Patient Care Overview  Goal: Plan of Care Review   11/20/18 1003   Coping/Psychosocial   Plan of Care Reviewed With patient   Plan of Care Review   Progress improving     SLP treatment completed. Will continue to address cog-com deficits, goals met for dysphagia at this level of care. Resume diet tolerance assessment/adjustments prn at SNF.  Please see note for further details and recommendations.

## 2018-11-21 NOTE — THERAPY DISCHARGE NOTE
Acute Care - Physical Therapy Discharge Summary  [facility]       Patient Name: Val Hill  : 1930  MRN: [id number]    Today's Date: 2018  Onset of Illness/Injury or Date of Surgery: 18    Date of Referral to PT: 18  Referring Physician: MD RAMIREZ      Admit Date: 2018      PT Recommendation and Plan    Visit Dx:    ICD-10-CM ICD-9-CM   1. Dysphagia, unspecified type R13.10 787.20   2. Encephalopathy acute G93.40 348.30   3. Bacterial UTI N39.0 599.0    A49.9 041.9   4. Cerebral ischemia I67.82 437.1   5. Impaired mobility and ADLs Z74.09 799.89   6. Impaired functional mobility, balance, gait, and endurance Z74.09 V49.89       Outcome Measures     Row Name 18 1400 18 1620          How much help from another is currently needed...    Putting on and taking off regular lower body clothing?  --  1  -AR     Bathing (including washing, rinsing, and drying)  --  2  -AR     Toileting (which includes using toilet bed pan or urinal)  --  1  -AR     Putting on and taking off regular upper body clothing  --  2  -AR     Taking care of personal grooming (such as brushing teeth)  --  2  -AR     Eating meals  --  2  -AR     Score  --  10  -AR        Modified Wellman Scale    Modified Wellman Scale  4 - Moderately severe disability.  Unable to walk without assistance, and unable to attend to own bodily needs without assistance.  -ES  --        Functional Assessment    Outcome Measure Options  --  AM-PAC 6 Clicks Daily Activity (OT)  -AR       User Key  (r) = Recorded By, (t) = Taken By, (c) = Cosigned By    Initials Name Provider Type    AR Kelly Falcon, OT Occupational Therapist    ES Nguyen Weiner, PT Physical Therapist            Therapy Suggested Charges     Code   Minutes Charges    06207 (CPT®) Hc Pt Neuromusc Re Education Ea 15 Min      14465 (CPT®) Hc Pt Ther Proc Ea 15 Min      47098 (CPT®) Hc Gait Training Ea 15 Min 15 1    06596 (CPT®) Hc Pt Therapeutic Act Ea  15 Min      68025 (CPT®) Hc Pt Manual Therapy Ea 15 Min      40941 (CPT®) Hc Pt Iontophoresis Ea 15 Min      19238 (CPT®) Hc Pt Elec Stim Ea-Per 15 Min      36223 (CPT®) Hc Pt Ultrasound Ea 15 Min      52600 (CPT®) Hc Pt Self Care/Mgmt/Train Ea 15 Min      02098 (CPT®) Hc Pt Prosthetic (S) Train Initial Encounter, Each 15 Min      26705 (CPT®) Hc Pt Orthotic(S)/Prosthetic(S) Encounter, Each 15 Min      28419 (CPT®) Hc Orthotic(S) Mgmt/Train Initial Encounter, Each 15min      Total  15 1          PT Rehab Goals     Row Name 11/21/18 1400             Bed Mobility Goal 1 (PT)    Activity/Assistive Device (Bed Mobility Goal 1, PT)  sit to supine;supine to sit  -ES      [place] Level/Cues Needed (Bed Mobility Goal 1, PT)  contact guard assist  -ES      Time Frame (Bed Mobility Goal 1, PT)  long term goal (LTG);2 weeks  -ES         Transfer Goal 1 (PT)    Activity/Assistive Device (Transfer Goal 1, PT)  transfers, all;walker, rolling  -ES      [place] Level/Cues Needed (Transfer Goal 1, PT)  contact guard assist  -ES      Time Frame (Transfer Goal 1, PT)  long term goal (LTG);2 weeks  -ES         Gait Training Goal 1 (PT)    Activity/Assistive Device (Gait Training Goal 1, PT)  gait (walking locomotion);assistive device use;walker, rolling  -ES      [place] Level (Gait Training Goal 1, PT)  contact guard assist  -ES      Distance (Gait Goal 1, PT)  250  -ES      Time Frame (Gait Training Goal 1, PT)  long term goal (LTG);2 weeks  -ES         Patient Education Goal (PT)    Activity (Patient Education Goal, PT)  HEP  -ES      [place]/Cues/Accuracy (Memory Goal 2, PT)  demonstrates adequately;verbalizes understanding PT/FAMILY  -ES        User Key  (r) = Recorded By, (t) = Taken By, (c) = Cosigned By    Initials Name Provider Type Discipline    Nguyen Alcala, PT Physical Therapist PT           Goals Status: Treatment plan discontinued secondary to discharge from acute facility.      PT Discharge  Summary  Reason for Discharge: Discharge from facility  Discharge Destination: [facility]      Nguyen Weiner, PT   11/21/2018

## 2018-12-27 NOTE — PROGRESS NOTES
[facility] [place] [facility] Note    Subjective     Chief Complaint   Patient presents with   • Left Wrist - Pain        HPI    Val Hill is a 88 y.o. female. ***      Patient Active Problem List   Diagnosis   • Stroke (CMS/HCC)   • UTI (urinary tract infection)   • Metabolic encephalopathy   • Type 2 diabetes mellitus (CMS/HCC)   • Dementia   • Leukocytosis     Past Medical History:   Diagnosis Date   • Dementia    • Diabetes mellitus (CMS/HCC)    • Hyperlipidemia    • Hypertension    • Stroke (CMS/HCC)       Past Surgical History:   Procedure Laterality Date   • ELBOW PROCEDURE     • HYSTERECTOMY        History reviewed. No pertinent family history.  Social History     Socioeconomic History   • Marital status:      Spouse name: Not on file   • Number of children: Not on file   • Years of education: Not on file   • Highest education level: Not on file   Social Needs   • Financial resource strain: Not on file   • Food insecurity - worry: Not on file   • Food insecurity - inability: Not on file   • Transportation needs - medical: Not on file   • Transportation needs - non-medical: Not on file   Occupational History   • Not on file   Tobacco Use   • Smoking status: Former Smoker   Substance and Sexual Activity   • Alcohol use: No   • Drug use: No   • Sexual activity: Defer   Other Topics Concern   • Not on file   Social History Narrative   • Not on file      Current Outpatient Medications on File Prior to Visit   Medication Sig Dispense Refill   • acetaminophen (TYLENOL) 325 MG tablet Take 2 tablets by mouth Every 4 (Four) Hours As Needed for Mild Pain  or Fever (Temperature greater than or equal to 37 C). 1 bottle 0   • amoxicillin-clavulanate (AUGMENTIN) 875-125 MG per tablet Take 1 tablet by mouth 2 (Two) Times a Day for 7 days. 14 tablet 0   • aspirin 81 MG tablet Take 81 mg by mouth Daily.     • atorvastatin (LIPITOR) 40 MG tablet Take 1 tablet by mouth Every Night. 30 tablet 0   •  cholecalciferol (VITAMIN D3) 1000 units tablet Take 1,000 Units by mouth Daily.     • docusate sodium (COLACE) 100 MG capsule Take 100 mg by mouth Daily.     • Donepezil HCl (ARICEPT PO) Take 23 mg by mouth Daily.     • erythromycin (ROMYCIN) 5 MG/GM ophthalmic ointment Administer  to the right eye Every 4 (Four) Hours While Awake for 5 days. 3.5 g 0   • FLUoxetine HCl (PROZAC PO) Take 20 mg by mouth Daily.     • lisinopril (PRINIVIL,ZESTRIL) 10 MG tablet Take 1 tablet by mouth Daily. 30 tablet 0   • METFORMIN HCL PO Take 500 mg by mouth 2 (Two) Times a Day.     • Multiple Vitamins-Minerals (MULTIVITAMIN ADULT PO) Take  by mouth.     • potassium chloride (K-DUR) 10 MEQ CR tablet Take 1 tablet by mouth Daily. 7 tablet 0   • QUEtiapine (SEROquel) 25 MG tablet Take 1 tablet by mouth Every Night. 30 tablet 0   • sucralfate (CARAFATE) 1 G tablet Take 1 tablet by mouth 4 (Four) Times a Day Before Meals & at Bedtime. 60 tablet 0   • vitamin B-12 (CYANOCOBALAMIN) 1000 MCG tablet Take 1,000 mcg by mouth Daily.       Current Facility-Administered Medications on File Prior to Visit   Medication Dose Route Frequency Provider Last Rate Last Dose   • [COMPLETED] potassium chloride (MICRO-K) CR capsule 40 mEq  40 mEq Oral Once Frank Agarwal PA   40 mEq at 12/26/18 2036   • [COMPLETED] sodium chloride 0.9 % bolus 1,000 mL  1,000 mL Intravenous Once Frank Agarwal PA   Stopped at 12/26/18 2134   • [DISCONTINUED] sodium chloride 0.9 % flush 10 mL  10 mL Intravenous PRN Frank Agarwal PA          No Known Allergies     Review of Systems   Constitutional: Negative for activity change, appetite change, chills, diaphoresis, fatigue, fever and unexpected weight change.   HENT: Negative for congestion, dental problem, drooling, ear discharge, ear pain, facial swelling, hearing loss, mouth sores, nosebleeds, postnasal drip, rhinorrhea, sinus pressure, sneezing, sore throat, tinnitus, trouble swallowing and voice change.     "  Eyes: Negative for photophobia, pain, discharge, redness, itching and visual disturbance.   Respiratory: Negative for apnea, cough, choking, chest tightness, shortness of breath, wheezing and stridor.    Cardiovascular: Negative for chest pain, palpitations and leg swelling.   Gastrointestinal: Negative for abdominal distention, abdominal pain, anal bleeding, blood in stool, constipation, diarrhea, nausea, rectal pain and vomiting.   Endocrine: Negative for cold intolerance, heat intolerance, polydipsia, polyphagia and polyuria.   Genitourinary: Negative for decreased urine volume, difficulty urinating, dysuria, enuresis, flank pain, frequency, genital sores, hematuria and urgency.   Musculoskeletal: Positive for arthralgias (wrist pain). Negative for back pain, gait problem, joint swelling, myalgias, neck pain and neck stiffness.   Skin: Negative for color change, pallor, rash and wound.   Allergic/Immunologic: Negative for environmental allergies, food allergies and immunocompromised state.   Neurological: Negative for dizziness, tremors, seizures, syncope, facial asymmetry, speech difficulty, weakness, light-headedness, numbness and headaches.   Hematological: Negative for adenopathy. Does not bruise/bleed easily.   Psychiatric/Behavioral: Negative for agitation, behavioral problems, confusion, decreased concentration, dysphoric mood, hallucinations, self-injury, sleep disturbance and suicidal ideas. The patient is not nervous/anxious and is not hyperactive.         Objective      Physical Exam  Ht 157.5 cm (62.01\")   Wt 52.2 kg (115 lb 1.3 oz)   BMI 21.04 kg/m²     Body mass index is 21.04 kg/m².    General:   Mental Status:  Alert   Appearance: Cooperative, in no acute distress   Build and Nutrition: Well-nourished and well developed*** female   Orientation: Alert and oriented to person, place and time   Posture: Normal   Gait: Normal    ***    Imaging/Studies  Imaging Results (last 24 hours)     ** No " results found for the last 24 hours. **            Assessment and Plan     There are no diagnoses linked to this encounter.    ***    No Follow-up on file.      Medical Decision Making  Management Options : {MDM - Management Options:67731}  Data/Risk: {MDM - Data/Risk:80754}      Ana Rodriguez V  12/27/18  2:11 PM

## 2018-12-27 NOTE — PROGRESS NOTES
[facility] [place] [facility] [place] [facility] [place]    Subjective     Chief Complaint   Patient presents with   • Left Wrist - Pain        HPI    Val Hill is a 88 y.o. female.  She presents today for evaluation of left nondominant wrist pain.  She injured the wrist on 12/22/2018, was seen in the emergency room, where radiographs were obtained which showed a distal radius fracture.  She is placed into a splint and referred here for further care and treatment.  The pain is 5 out of 10, aching in quality, and no previous history of trauma.      Patient Active Problem List   Diagnosis   • Stroke (CMS/HCC)   • UTI (urinary tract infection)   • Metabolic encephalopathy   • Type 2 diabetes mellitus (CMS/HCC)   • Dementia   • Leukocytosis     Past Medical History:   Diagnosis Date   • Dementia    • Diabetes mellitus (CMS/HCC)    • Hyperlipidemia    • Hypertension    • Stroke (CMS/HCC)       Past Surgical History:   Procedure Laterality Date   • ELBOW PROCEDURE     • HYSTERECTOMY        Family History   Problem Relation Age of Onset   • Cancer Father      Social History     Socioeconomic History   • Marital status:      Spouse name: Not on file   • Number of children: Not on file   • Years of education: Not on file   • Highest education level: Not on file   Social Needs   • Financial resource strain: Not on file   • Food insecurity - worry: Not on file   • Food insecurity - inability: Not on file   • Transportation needs - medical: Not on file   • Transportation needs - non-medical: Not on file   Occupational History   • Not on file   Tobacco Use   • Smoking status: Former Smoker   • Smokeless tobacco: Never Used   Substance and Sexual Activity   • Alcohol use: No   • Drug use: No   • Sexual activity: Defer   Other Topics Concern   • Not on file   Social History Narrative   • Not on file      Current Outpatient Medications on File Prior to Visit   Medication Sig Dispense Refill   • acetaminophen (TYLENOL) 325 MG tablet  Take 2 tablets by mouth Every 4 (Four) Hours As Needed for Mild Pain  or Fever (Temperature greater than or equal to 37 C). 1 bottle 0   • amoxicillin-clavulanate (AUGMENTIN) 875-125 MG per tablet Take 1 tablet by mouth 2 (Two) Times a Day for 7 days. 14 tablet 0   • aspirin 81 MG tablet Take 81 mg by mouth Daily.     • atorvastatin (LIPITOR) 40 MG tablet Take 1 tablet by mouth Every Night. 30 tablet 0   • cholecalciferol (VITAMIN D3) 1000 units tablet Take 1,000 Units by mouth Daily.     • docusate sodium (COLACE) 100 MG capsule Take 100 mg by mouth Daily.     • Donepezil HCl (ARICEPT PO) Take 23 mg by mouth Daily.     • erythromycin (ROMYCIN) 5 MG/GM ophthalmic ointment Administer  to the right eye Every 4 (Four) Hours While Awake for 5 days. 3.5 g 0   • FLUoxetine HCl (PROZAC PO) Take 20 mg by mouth Daily.     • lisinopril (PRINIVIL,ZESTRIL) 10 MG tablet Take 1 tablet by mouth Daily. 30 tablet 0   • METFORMIN HCL PO Take 500 mg by mouth 2 (Two) Times a Day.     • Multiple Vitamins-Minerals (MULTIVITAMIN ADULT PO) Take  by mouth.     • potassium chloride (K-DUR) 10 MEQ CR tablet Take 1 tablet by mouth Daily. 7 tablet 0   • QUEtiapine (SEROquel) 25 MG tablet Take 1 tablet by mouth Every Night. 30 tablet 0   • sucralfate (CARAFATE) 1 G tablet Take 1 tablet by mouth 4 (Four) Times a Day Before Meals & at Bedtime. 60 tablet 0   • vitamin B-12 (CYANOCOBALAMIN) 1000 MCG tablet Take 1,000 mcg by mouth Daily.     • [DISCONTINUED] ACYCLOVIR PO Take 500 mg by mouth 3 (Three) Times a Day.     • [DISCONTINUED] PREDNISONE PO Take 8 mg by mouth Daily.       Current Facility-Administered Medications on File Prior to Visit   Medication Dose Route Frequency Provider Last Rate Last Dose   • [COMPLETED] potassium chloride (MICRO-K) CR capsule 40 mEq  40 mEq Oral Once Frank Agarwal PA   40 mEq at 12/26/18 2036   • [COMPLETED] sodium chloride 0.9 % bolus 1,000 mL  1,000 mL Intravenous Once Frank Agarwal PA   Stopped at  12/26/18 2134   • [DISCONTINUED] sodium chloride 0.9 % flush 10 mL  10 mL Intravenous PRN Frank Agarwal PA          No Known Allergies     Review of Systems   Constitutional: Negative for activity change, appetite change, chills, diaphoresis, fatigue, fever and unexpected weight change.   HENT: Negative for congestion, dental problem, drooling, ear discharge, ear pain, facial swelling, hearing loss, mouth sores, nosebleeds, postnasal drip, rhinorrhea, sinus pressure, sneezing, sore throat, tinnitus, trouble swallowing and voice change.    Eyes: Negative for photophobia, pain, discharge, redness, itching and visual disturbance.   Respiratory: Negative for apnea, cough, choking, chest tightness, shortness of breath, wheezing and stridor.    Cardiovascular: Negative for chest pain, palpitations and leg swelling.   Gastrointestinal: Negative for abdominal distention, abdominal pain, anal bleeding, blood in stool, constipation, diarrhea, nausea, rectal pain and vomiting.   Endocrine: Negative for cold intolerance, heat intolerance, polydipsia, polyphagia and polyuria.   Genitourinary: Negative for decreased urine volume, difficulty urinating, dysuria, enuresis, flank pain, frequency, genital sores, hematuria and urgency.   Musculoskeletal: Positive for arthralgias and joint swelling. Negative for back pain, gait problem, myalgias, neck pain and neck stiffness.   Skin: Negative for color change, pallor, rash and wound.   Allergic/Immunologic: Negative for environmental allergies, food allergies and immunocompromised state.   Neurological: Negative for dizziness, tremors, seizures, syncope, facial asymmetry, speech difficulty, weakness, light-headedness, numbness and headaches.   Hematological: Negative for adenopathy. Does not bruise/bleed easily.   Psychiatric/Behavioral: Negative for agitation, behavioral problems, confusion, decreased concentration, dysphoric mood, hallucinations, self-injury, sleep disturbance  "and suicidal ideas. The patient is not nervous/anxious and is not hyperactive.         Objective      Physical Exam  Pulse 80   Ht 157.5 cm (62.01\")   Wt 52.2 kg (115 lb 1.3 oz)   SpO2 97%   BMI 21.04 kg/m²     Body mass index is 21.04 kg/m².    General:   Mental Status:  Alert   Appearance: Cooperative, in no acute distress   Build and Nutrition: Thin deconditioned female   Orientation: Alert and cooperative with the examination   Posture: Sitting in a wheelchair    Integument:   Left wrist: Ecchymosis and swelling in the wrist    Neurologic:   Sensation:    Left hand: Intact to light touch in the digits   Motor:  Left upper extremity: 5/5 wrist flexors, wrist extensors, and interossei  Vascular:   Left upper extremity: 2+ radial pulse, prompt capillary refill    Upper Extremities:   Left Wrist:    Tenderness:  Tender over the distal radius    Effusion: None    Swelling:  Positive    Crepitus:  None    Atrophy:  None    Range of motion:  Flexion:  90 degrees       Extension:  90 degrees       Pronation:  90 degrees       Supination: 90 degrees  Instability:  None  Deformities:  Mild deformity in the wrist        Imaging/Studies    EXAM:    XR Left Wrist Complete, 3 or more Views      EXAM DATE/TIME:    12/22/2018 11:50 PM      CLINICAL HISTORY:    87 years old, female; Pain; Wrist; Left; Additional info: Fall, swelling, pain      TECHNIQUE:    XR Left wrist 3 or more views.      COMPARISON:    No relevant prior studies available.      FINDINGS:    Bones/joints:       Acute:     Acute impacted and comminuted intra-articular fracture of the distal radius.   Acute minimally displaced fracture of the ulnar styloid base.     Chronic:     Osteopenia. 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   Mild to moderate osteoarthrosis of the triscaphe joint. Degenerative cystic   changes in the ulnar aspect of the proximal lunate indicating chronic   ulnocarpal abutment syndrome. Small focus of " mineralization at the ulnar aspect   of the distal hamate indicating a remote injury.       Soft tissues:       Soft tissue swelling about the wrist.      IMPRESSION:  1. Acute impacted and comminuted intra-articular fracture of the distal radius.   2. Acute minimally displaced fracture of the ulnar styloid base.   3. Soft tissue swelling about the wrist.   4. Chronic findings as described.      THIS DOCUMENT HAS BEEN ELECTRONICALLY SIGNED BY CRISTAL PADILLA      X-rays of the wrist were obtained in the cast, which showed good alignment.      Assessment and Plan     Val was seen today for pain.    Diagnoses and all orders for this visit:    Closed Colles' fracture of left radius, initial encounter  -     XR Wrist 2 View Left        I reviewed my findings with the patient and her son.  At this point, I did recommend conservative treatment, given her physical demands and overall medical status.  We can accept some additional settling of the fracture, but we will place her into a short-arm cast today, with follow-up in 2 weeks with repeat x-rays in the cast.  A well molded fiberglass short arm cast was applied today.    Return in about 2 weeks (around 1/10/2019) for X-rays In Cast.      Medical Decision Making  Management Options : close treatment of fracture or dislocation  Data/Risk: radiology tests and independent visualization of imaging, lab tests, or EMG/NCV      Nael Erwin MD  12/27/18  4:20 PM

## 2019-01-01 ENCOUNTER — APPOINTMENT (OUTPATIENT)
Dept: CT IMAGING | Facility: HOSPITAL | Age: 84
End: 2019-01-01

## 2019-01-01 ENCOUNTER — OFFICE VISIT (OUTPATIENT)
Dept: ORTHOPEDIC SURGERY | Facility: CLINIC | Age: 84
End: 2019-01-01

## 2019-01-01 ENCOUNTER — APPOINTMENT (OUTPATIENT)
Dept: GENERAL RADIOLOGY | Facility: HOSPITAL | Age: 84
End: 2019-01-01

## 2019-01-01 ENCOUNTER — ANESTHESIA EVENT (OUTPATIENT)
Dept: PERIOP | Facility: HOSPITAL | Age: 84
End: 2019-01-01

## 2019-01-01 ENCOUNTER — ANESTHESIA (OUTPATIENT)
Dept: PERIOP | Facility: HOSPITAL | Age: 84
End: 2019-01-01

## 2019-01-01 ENCOUNTER — TELEPHONE (OUTPATIENT)
Dept: ORTHOPEDIC SURGERY | Facility: CLINIC | Age: 84
End: 2019-01-01

## 2019-01-01 ENCOUNTER — HOSPITAL ENCOUNTER (INPATIENT)
Facility: HOSPITAL | Age: 84
LOS: 8 days | Discharge: NURSING, REHAB OR LONG-TERM CARE | End: 2019-01-14
Attending: EMERGENCY MEDICINE | Admitting: ORTHOPAEDIC SURGERY

## 2019-01-01 ENCOUNTER — LAB REQUISITION (OUTPATIENT)
Dept: LAB | Facility: HOSPITAL | Age: 84
End: 2019-01-01

## 2019-01-01 VITALS
RESPIRATION RATE: 16 BRPM | HEIGHT: 67 IN | BODY MASS INDEX: 15.81 KG/M2 | SYSTOLIC BLOOD PRESSURE: 155 MMHG | WEIGHT: 100.75 LBS | TEMPERATURE: 98.6 F | HEART RATE: 76 BPM | OXYGEN SATURATION: 97 % | DIASTOLIC BLOOD PRESSURE: 67 MMHG

## 2019-01-01 VITALS — BODY MASS INDEX: 15.81 KG/M2 | WEIGHT: 100.75 LBS | OXYGEN SATURATION: 99 % | HEIGHT: 67 IN | HEART RATE: 105 BPM

## 2019-01-01 DIAGNOSIS — Z09 FRACTURE FOLLOW-UP: Primary | ICD-10-CM

## 2019-01-01 DIAGNOSIS — S52.532A CLOSED COLLES' FRACTURE OF LEFT RADIUS, INITIAL ENCOUNTER: ICD-10-CM

## 2019-01-01 DIAGNOSIS — Z78.9 IMPAIRED MOBILITY AND ADLS: ICD-10-CM

## 2019-01-01 DIAGNOSIS — Z86.73 HISTORY OF CVA (CEREBROVASCULAR ACCIDENT): ICD-10-CM

## 2019-01-01 DIAGNOSIS — Z74.09 IMPAIRED MOBILITY AND ADLS: ICD-10-CM

## 2019-01-01 DIAGNOSIS — Z86.39 HISTORY OF DIABETES MELLITUS, TYPE II: ICD-10-CM

## 2019-01-01 DIAGNOSIS — Z74.09 IMPAIRED FUNCTIONAL MOBILITY, BALANCE, GAIT, AND ENDURANCE: ICD-10-CM

## 2019-01-01 DIAGNOSIS — S72.92XA CLOSED FRACTURE OF LEFT FEMUR, UNSPECIFIED FRACTURE MORPHOLOGY, UNSPECIFIED PORTION OF FEMUR, INITIAL ENCOUNTER (HCC): Primary | ICD-10-CM

## 2019-01-01 DIAGNOSIS — F03.90 DEMENTIA WITHOUT BEHAVIORAL DISTURBANCE, UNSPECIFIED DEMENTIA TYPE: ICD-10-CM

## 2019-01-01 DIAGNOSIS — Z00.00 ROUTINE GENERAL MEDICAL EXAMINATION AT A HEALTH CARE FACILITY: ICD-10-CM

## 2019-01-01 LAB
ABO + RH BLD: NORMAL
ABO + RH BLD: NORMAL
ABO GROUP BLD: NORMAL
ABO GROUP BLD: NORMAL
ALBUMIN SERPL-MCNC: 2.63 G/DL (ref 3.2–4.8)
ALBUMIN SERPL-MCNC: 3.49 G/DL (ref 3.2–4.8)
ALBUMIN/GLOB SERPL: 1.6 G/DL (ref 1.5–2.5)
ALBUMIN/GLOB SERPL: 1.7 G/DL (ref 1.5–2.5)
ALP SERPL-CCNC: 82 U/L (ref 25–100)
ALP SERPL-CCNC: 98 U/L (ref 25–100)
ALT SERPL W P-5'-P-CCNC: 19 U/L (ref 7–40)
ALT SERPL W P-5'-P-CCNC: 31 U/L (ref 7–40)
ANION GAP SERPL CALCULATED.3IONS-SCNC: 5 MMOL/L (ref 3–11)
ANION GAP SERPL CALCULATED.3IONS-SCNC: 6 MMOL/L (ref 3–11)
ANION GAP SERPL CALCULATED.3IONS-SCNC: 7 MMOL/L (ref 3–11)
ANION GAP SERPL CALCULATED.3IONS-SCNC: 8 MMOL/L (ref 3–11)
AST SERPL-CCNC: 21 U/L (ref 0–33)
AST SERPL-CCNC: 23 U/L (ref 0–33)
BASOPHILS # BLD AUTO: 0.01 10*3/MM3 (ref 0–0.2)
BASOPHILS # BLD AUTO: 0.02 10*3/MM3 (ref 0–0.2)
BASOPHILS # BLD AUTO: 0.02 10*3/MM3 (ref 0–0.2)
BASOPHILS # BLD AUTO: 0.03 10*3/MM3 (ref 0–0.2)
BASOPHILS NFR BLD AUTO: 0.1 % (ref 0–1)
BASOPHILS NFR BLD AUTO: 0.1 % (ref 0–1)
BASOPHILS NFR BLD AUTO: 0.2 % (ref 0–1)
BASOPHILS NFR BLD AUTO: 0.3 % (ref 0–1)
BH BB BLOOD EXPIRATION DATE: NORMAL
BH BB BLOOD EXPIRATION DATE: NORMAL
BH BB BLOOD TYPE BARCODE: 6200
BH BB BLOOD TYPE BARCODE: 6200
BH BB DISPENSE STATUS: NORMAL
BH BB DISPENSE STATUS: NORMAL
BH BB PRODUCT CODE: NORMAL
BH BB PRODUCT CODE: NORMAL
BH BB UNIT NUMBER: NORMAL
BH BB UNIT NUMBER: NORMAL
BILIRUB SERPL-MCNC: 0.3 MG/DL (ref 0.3–1.2)
BILIRUB SERPL-MCNC: 0.3 MG/DL (ref 0.3–1.2)
BILIRUB UR QL STRIP: NEGATIVE
BLD GP AB SCN SERPL QL: NEGATIVE
BNP SERPL-MCNC: 24 PG/ML (ref 0–100)
BUN BLD-MCNC: 10 MG/DL (ref 9–23)
BUN BLD-MCNC: 23 MG/DL (ref 9–23)
BUN BLD-MCNC: 5 MG/DL (ref 9–23)
BUN BLD-MCNC: 5 MG/DL (ref 9–23)
BUN BLD-MCNC: 8 MG/DL (ref 9–23)
BUN BLD-MCNC: 9 MG/DL (ref 9–23)
BUN/CREAT SERPL: 11.9 (ref 7–25)
BUN/CREAT SERPL: 12.8 (ref 7–25)
BUN/CREAT SERPL: 13.6 (ref 7–25)
BUN/CREAT SERPL: 15.2 (ref 7–25)
BUN/CREAT SERPL: 18 (ref 7–25)
BUN/CREAT SERPL: 27.7 (ref 7–25)
CALCIUM SPEC-SCNC: 8.2 MG/DL (ref 8.7–10.4)
CALCIUM SPEC-SCNC: 8.3 MG/DL (ref 8.7–10.4)
CALCIUM SPEC-SCNC: 8.9 MG/DL (ref 8.7–10.4)
CALCIUM SPEC-SCNC: 9 MG/DL (ref 8.7–10.4)
CALCIUM SPEC-SCNC: 9.5 MG/DL (ref 8.7–10.4)
CALCIUM SPEC-SCNC: 9.8 MG/DL (ref 8.7–10.4)
CHLORIDE SERPL-SCNC: 100 MMOL/L (ref 99–109)
CHLORIDE SERPL-SCNC: 100 MMOL/L (ref 99–109)
CHLORIDE SERPL-SCNC: 102 MMOL/L (ref 99–109)
CHLORIDE SERPL-SCNC: 103 MMOL/L (ref 99–109)
CHLORIDE SERPL-SCNC: 105 MMOL/L (ref 99–109)
CHLORIDE SERPL-SCNC: 99 MMOL/L (ref 99–109)
CK SERPL-CCNC: 32 U/L (ref 26–174)
CLARITY UR: CLEAR
CO2 SERPL-SCNC: 27 MMOL/L (ref 20–31)
CO2 SERPL-SCNC: 28 MMOL/L (ref 20–31)
CO2 SERPL-SCNC: 29 MMOL/L (ref 20–31)
CO2 SERPL-SCNC: 29 MMOL/L (ref 20–31)
CO2 SERPL-SCNC: 30 MMOL/L (ref 20–31)
CO2 SERPL-SCNC: 31 MMOL/L (ref 20–31)
COLOR UR: YELLOW
CREAT BLD-MCNC: 0.39 MG/DL (ref 0.6–1.3)
CREAT BLD-MCNC: 0.42 MG/DL (ref 0.6–1.3)
CREAT BLD-MCNC: 0.5 MG/DL (ref 0.6–1.3)
CREAT BLD-MCNC: 0.59 MG/DL (ref 0.6–1.3)
CREAT BLD-MCNC: 0.66 MG/DL (ref 0.6–1.3)
CREAT BLD-MCNC: 0.83 MG/DL (ref 0.6–1.3)
D-LACTATE SERPL-SCNC: 0.9 MMOL/L (ref 0.5–2)
DEPRECATED RDW RBC AUTO: 41.6 FL (ref 37–54)
DEPRECATED RDW RBC AUTO: 41.8 FL (ref 37–54)
DEPRECATED RDW RBC AUTO: 42.8 FL (ref 37–54)
DEPRECATED RDW RBC AUTO: 45.4 FL (ref 37–54)
DEPRECATED RDW RBC AUTO: 51.1 FL (ref 37–54)
EOSINOPHIL # BLD AUTO: 0.01 10*3/MM3 (ref 0–0.3)
EOSINOPHIL # BLD AUTO: 0.06 10*3/MM3 (ref 0–0.3)
EOSINOPHIL # BLD AUTO: 0.12 10*3/MM3 (ref 0–0.3)
EOSINOPHIL # BLD AUTO: 0.26 10*3/MM3 (ref 0–0.3)
EOSINOPHIL NFR BLD AUTO: 0.1 % (ref 0–3)
EOSINOPHIL NFR BLD AUTO: 0.4 % (ref 0–3)
EOSINOPHIL NFR BLD AUTO: 1.4 % (ref 0–3)
EOSINOPHIL NFR BLD AUTO: 2.9 % (ref 0–3)
ERYTHROCYTE [DISTWIDTH] IN BLOOD BY AUTOMATED COUNT: 12.6 % (ref 11.3–14.5)
ERYTHROCYTE [DISTWIDTH] IN BLOOD BY AUTOMATED COUNT: 12.8 % (ref 11.3–14.5)
ERYTHROCYTE [DISTWIDTH] IN BLOOD BY AUTOMATED COUNT: 12.9 % (ref 11.3–14.5)
ERYTHROCYTE [DISTWIDTH] IN BLOOD BY AUTOMATED COUNT: 13.8 % (ref 11.3–14.5)
ERYTHROCYTE [DISTWIDTH] IN BLOOD BY AUTOMATED COUNT: 15 % (ref 11.3–14.5)
GFR SERPL CREATININE-BSD FRML MDRD: 116 ML/MIN/1.73
GFR SERPL CREATININE-BSD FRML MDRD: 142 ML/MIN/1.73
GFR SERPL CREATININE-BSD FRML MDRD: 65 ML/MIN/1.73
GFR SERPL CREATININE-BSD FRML MDRD: 85 ML/MIN/1.73
GFR SERPL CREATININE-BSD FRML MDRD: 96 ML/MIN/1.73
GFR SERPL CREATININE-BSD FRML MDRD: >150 ML/MIN/1.73
GLOBULIN UR ELPH-MCNC: 1.6 GM/DL
GLOBULIN UR ELPH-MCNC: 2.2 GM/DL
GLUCOSE BLD-MCNC: 100 MG/DL (ref 70–100)
GLUCOSE BLD-MCNC: 140 MG/DL (ref 70–100)
GLUCOSE BLD-MCNC: 157 MG/DL (ref 70–100)
GLUCOSE BLD-MCNC: 168 MG/DL (ref 70–100)
GLUCOSE BLD-MCNC: 182 MG/DL (ref 70–100)
GLUCOSE BLD-MCNC: 186 MG/DL (ref 70–100)
GLUCOSE BLDC GLUCOMTR-MCNC: 110 MG/DL (ref 70–130)
GLUCOSE BLDC GLUCOMTR-MCNC: 112 MG/DL (ref 70–130)
GLUCOSE BLDC GLUCOMTR-MCNC: 116 MG/DL (ref 70–130)
GLUCOSE BLDC GLUCOMTR-MCNC: 121 MG/DL (ref 70–130)
GLUCOSE BLDC GLUCOMTR-MCNC: 129 MG/DL (ref 70–130)
GLUCOSE BLDC GLUCOMTR-MCNC: 131 MG/DL (ref 70–130)
GLUCOSE BLDC GLUCOMTR-MCNC: 136 MG/DL (ref 70–130)
GLUCOSE BLDC GLUCOMTR-MCNC: 139 MG/DL (ref 70–130)
GLUCOSE BLDC GLUCOMTR-MCNC: 140 MG/DL (ref 70–130)
GLUCOSE BLDC GLUCOMTR-MCNC: 142 MG/DL (ref 70–130)
GLUCOSE BLDC GLUCOMTR-MCNC: 144 MG/DL (ref 70–130)
GLUCOSE BLDC GLUCOMTR-MCNC: 147 MG/DL (ref 70–130)
GLUCOSE BLDC GLUCOMTR-MCNC: 148 MG/DL (ref 70–130)
GLUCOSE BLDC GLUCOMTR-MCNC: 150 MG/DL (ref 70–130)
GLUCOSE BLDC GLUCOMTR-MCNC: 151 MG/DL (ref 70–130)
GLUCOSE BLDC GLUCOMTR-MCNC: 151 MG/DL (ref 70–130)
GLUCOSE BLDC GLUCOMTR-MCNC: 152 MG/DL (ref 70–130)
GLUCOSE BLDC GLUCOMTR-MCNC: 153 MG/DL (ref 70–130)
GLUCOSE BLDC GLUCOMTR-MCNC: 153 MG/DL (ref 70–130)
GLUCOSE BLDC GLUCOMTR-MCNC: 156 MG/DL (ref 70–130)
GLUCOSE BLDC GLUCOMTR-MCNC: 160 MG/DL (ref 70–130)
GLUCOSE BLDC GLUCOMTR-MCNC: 163 MG/DL (ref 70–130)
GLUCOSE BLDC GLUCOMTR-MCNC: 163 MG/DL (ref 70–130)
GLUCOSE BLDC GLUCOMTR-MCNC: 166 MG/DL (ref 70–130)
GLUCOSE BLDC GLUCOMTR-MCNC: 167 MG/DL (ref 70–130)
GLUCOSE BLDC GLUCOMTR-MCNC: 184 MG/DL (ref 70–130)
GLUCOSE BLDC GLUCOMTR-MCNC: 185 MG/DL (ref 70–130)
GLUCOSE BLDC GLUCOMTR-MCNC: 192 MG/DL (ref 70–130)
GLUCOSE BLDC GLUCOMTR-MCNC: 192 MG/DL (ref 70–130)
GLUCOSE BLDC GLUCOMTR-MCNC: 193 MG/DL (ref 70–130)
GLUCOSE BLDC GLUCOMTR-MCNC: 196 MG/DL (ref 70–130)
GLUCOSE BLDC GLUCOMTR-MCNC: 202 MG/DL (ref 70–130)
GLUCOSE UR STRIP-MCNC: NEGATIVE MG/DL
HBA1C MFR BLD: 6.7 % (ref 4.8–5.6)
HCT VFR BLD AUTO: 22.9 % (ref 34.5–44)
HCT VFR BLD AUTO: 23.1 % (ref 34.5–44)
HCT VFR BLD AUTO: 23.2 % (ref 34.5–44)
HCT VFR BLD AUTO: 23.8 % (ref 34.5–44)
HCT VFR BLD AUTO: 26 % (ref 34.5–44)
HCT VFR BLD AUTO: 30 % (ref 34.5–44)
HCT VFR BLD AUTO: 32.1 % (ref 34.5–44)
HCT VFR BLD AUTO: 33.8 % (ref 34.5–44)
HCT VFR BLD AUTO: 34.3 % (ref 34.5–44)
HCT VFR BLD AUTO: 35.5 % (ref 34.5–44)
HCT VFR BLD AUTO: 37.8 % (ref 34.5–44)
HGB BLD-MCNC: 10.3 G/DL (ref 11.5–15.5)
HGB BLD-MCNC: 10.6 G/DL (ref 11.5–15.5)
HGB BLD-MCNC: 10.9 G/DL (ref 11.5–15.5)
HGB BLD-MCNC: 11 G/DL (ref 11.5–15.5)
HGB BLD-MCNC: 12.2 G/DL (ref 11.5–15.5)
HGB BLD-MCNC: 7.4 G/DL (ref 11.5–15.5)
HGB BLD-MCNC: 7.5 G/DL (ref 11.5–15.5)
HGB BLD-MCNC: 7.5 G/DL (ref 11.5–15.5)
HGB BLD-MCNC: 7.7 G/DL (ref 11.5–15.5)
HGB BLD-MCNC: 8.5 G/DL (ref 11.5–15.5)
HGB BLD-MCNC: 9.6 G/DL (ref 11.5–15.5)
HGB UR QL STRIP.AUTO: NEGATIVE
IMM GRANULOCYTES # BLD AUTO: 0.01 10*3/MM3 (ref 0–0.03)
IMM GRANULOCYTES # BLD AUTO: 0.02 10*3/MM3 (ref 0–0.03)
IMM GRANULOCYTES # BLD AUTO: 0.04 10*3/MM3 (ref 0–0.03)
IMM GRANULOCYTES # BLD AUTO: 0.05 10*3/MM3 (ref 0–0.03)
IMM GRANULOCYTES NFR BLD AUTO: 0.1 % (ref 0–0.6)
IMM GRANULOCYTES NFR BLD AUTO: 0.2 % (ref 0–0.6)
IMM GRANULOCYTES NFR BLD AUTO: 0.3 % (ref 0–0.6)
IMM GRANULOCYTES NFR BLD AUTO: 0.4 % (ref 0–0.6)
INR PPP: 1.1 (ref 0.91–1.09)
KETONES UR QL STRIP: ABNORMAL
LEUKOCYTE ESTERASE UR QL STRIP.AUTO: NEGATIVE
LYMPHOCYTES # BLD AUTO: 1.12 10*3/MM3 (ref 0.6–4.8)
LYMPHOCYTES # BLD AUTO: 1.57 10*3/MM3 (ref 0.6–4.8)
LYMPHOCYTES # BLD AUTO: 1.57 10*3/MM3 (ref 0.6–4.8)
LYMPHOCYTES # BLD AUTO: 2.69 10*3/MM3 (ref 0.6–4.8)
LYMPHOCYTES NFR BLD AUTO: 11.1 % (ref 24–44)
LYMPHOCYTES NFR BLD AUTO: 18.3 % (ref 24–44)
LYMPHOCYTES NFR BLD AUTO: 30.4 % (ref 24–44)
LYMPHOCYTES NFR BLD AUTO: 8.2 % (ref 24–44)
MAGNESIUM SERPL-MCNC: 1.4 MG/DL (ref 1.3–2.7)
MAGNESIUM SERPL-MCNC: 2.1 MG/DL (ref 1.3–2.7)
MCH RBC QN AUTO: 28.8 PG (ref 27–31)
MCH RBC QN AUTO: 28.9 PG (ref 27–31)
MCH RBC QN AUTO: 29.1 PG (ref 27–31)
MCH RBC QN AUTO: 29.2 PG (ref 27–31)
MCH RBC QN AUTO: 29.5 PG (ref 27–31)
MCHC RBC AUTO-ENTMCNC: 30.9 G/DL (ref 32–36)
MCHC RBC AUTO-ENTMCNC: 32.1 G/DL (ref 32–36)
MCHC RBC AUTO-ENTMCNC: 32.2 G/DL (ref 32–36)
MCHC RBC AUTO-ENTMCNC: 32.3 G/DL (ref 32–36)
MCHC RBC AUTO-ENTMCNC: 32.5 G/DL (ref 32–36)
MCV RBC AUTO: 89.9 FL (ref 80–99)
MCV RBC AUTO: 89.9 FL (ref 80–99)
MCV RBC AUTO: 90.4 FL (ref 80–99)
MCV RBC AUTO: 91.5 FL (ref 80–99)
MCV RBC AUTO: 93.2 FL (ref 80–99)
MONOCYTES # BLD AUTO: 0.5 10*3/MM3 (ref 0–1)
MONOCYTES # BLD AUTO: 0.6 10*3/MM3 (ref 0–1)
MONOCYTES # BLD AUTO: 0.62 10*3/MM3 (ref 0–1)
MONOCYTES # BLD AUTO: 0.7 10*3/MM3 (ref 0–1)
MONOCYTES NFR BLD AUTO: 4.3 % (ref 0–12)
MONOCYTES NFR BLD AUTO: 5.2 % (ref 0–12)
MONOCYTES NFR BLD AUTO: 5.8 % (ref 0–12)
MONOCYTES NFR BLD AUTO: 7 % (ref 0–12)
NEUTROPHILS # BLD AUTO: 11.71 10*3/MM3 (ref 1.5–8.3)
NEUTROPHILS # BLD AUTO: 11.86 10*3/MM3 (ref 1.5–8.3)
NEUTROPHILS # BLD AUTO: 5.26 10*3/MM3 (ref 1.5–8.3)
NEUTROPHILS # BLD AUTO: 6.37 10*3/MM3 (ref 1.5–8.3)
NEUTROPHILS NFR BLD AUTO: 59.5 % (ref 41–71)
NEUTROPHILS NFR BLD AUTO: 74.2 % (ref 41–71)
NEUTROPHILS NFR BLD AUTO: 84.1 % (ref 41–71)
NEUTROPHILS NFR BLD AUTO: 86.1 % (ref 41–71)
NITRITE UR QL STRIP: NEGATIVE
PH UR STRIP.AUTO: 6.5 [PH] (ref 5–8)
PHOSPHATE SERPL-MCNC: 2.6 MG/DL (ref 2.4–5.1)
PLATELET # BLD AUTO: 260 10*3/MM3 (ref 150–450)
PLATELET # BLD AUTO: 281 10*3/MM3 (ref 150–450)
PLATELET # BLD AUTO: 330 10*3/MM3 (ref 150–450)
PLATELET # BLD AUTO: 419 10*3/MM3 (ref 150–450)
PLATELET # BLD AUTO: 454 10*3/MM3 (ref 150–450)
PMV BLD AUTO: 10 FL (ref 6–12)
PMV BLD AUTO: 10 FL (ref 6–12)
PMV BLD AUTO: 9.6 FL (ref 6–12)
PMV BLD AUTO: 9.8 FL (ref 6–12)
PMV BLD AUTO: 9.9 FL (ref 6–12)
POTASSIUM BLD-SCNC: 2.9 MMOL/L (ref 3.5–5.5)
POTASSIUM BLD-SCNC: 3.2 MMOL/L (ref 3.5–5.5)
POTASSIUM BLD-SCNC: 3.5 MMOL/L (ref 3.5–5.5)
POTASSIUM BLD-SCNC: 3.5 MMOL/L (ref 3.5–5.5)
POTASSIUM BLD-SCNC: 3.8 MMOL/L (ref 3.5–5.5)
POTASSIUM BLD-SCNC: 3.8 MMOL/L (ref 3.5–5.5)
POTASSIUM BLD-SCNC: 3.9 MMOL/L (ref 3.5–5.5)
POTASSIUM BLD-SCNC: 4.5 MMOL/L (ref 3.5–5.5)
PROT SERPL-MCNC: 4.2 G/DL (ref 5.7–8.2)
PROT SERPL-MCNC: 5.7 G/DL (ref 5.7–8.2)
PROT UR QL STRIP: NEGATIVE
PROTHROMBIN TIME: 11.5 SECONDS (ref 9.6–11.5)
RBC # BLD AUTO: 2.57 10*6/MM3 (ref 3.89–5.14)
RBC # BLD AUTO: 3.57 10*6/MM3 (ref 3.89–5.14)
RBC # BLD AUTO: 3.68 10*6/MM3 (ref 3.89–5.14)
RBC # BLD AUTO: 3.74 10*6/MM3 (ref 3.89–5.14)
RBC # BLD AUTO: 4.13 10*6/MM3 (ref 3.89–5.14)
RH BLD: POSITIVE
RH BLD: POSITIVE
SODIUM BLD-SCNC: 135 MMOL/L (ref 132–146)
SODIUM BLD-SCNC: 136 MMOL/L (ref 132–146)
SODIUM BLD-SCNC: 136 MMOL/L (ref 132–146)
SODIUM BLD-SCNC: 137 MMOL/L (ref 132–146)
SODIUM BLD-SCNC: 138 MMOL/L (ref 132–146)
SODIUM BLD-SCNC: 139 MMOL/L (ref 132–146)
SP GR UR STRIP: 1.01 (ref 1–1.03)
T&S EXPIRATION DATE: NORMAL
TROPONIN I SERPL-MCNC: 0 NG/ML (ref 0–0.07)
UNIT  ABO: NORMAL
UNIT  ABO: NORMAL
UNIT  RH: NORMAL
UNIT  RH: NORMAL
UROBILINOGEN UR QL STRIP: ABNORMAL
WBC NRBC COR # BLD: 10.74 10*3/MM3 (ref 3.5–10.8)
WBC NRBC COR # BLD: 13.59 10*3/MM3 (ref 3.5–10.8)
WBC NRBC COR # BLD: 14.11 10*3/MM3 (ref 3.5–10.8)
WBC NRBC COR # BLD: 8.59 10*3/MM3 (ref 3.5–10.8)
WBC NRBC COR # BLD: 8.85 10*3/MM3 (ref 3.5–10.8)

## 2019-01-01 PROCEDURE — 25010000002 MORPHINE PER 10 MG: Performed by: INTERNAL MEDICINE

## 2019-01-01 PROCEDURE — 63710000001 QUETIAPINE 25 MG TABLET: Performed by: NURSE PRACTITIONER

## 2019-01-01 PROCEDURE — 82962 GLUCOSE BLOOD TEST: CPT

## 2019-01-01 PROCEDURE — 25010000003 CEFAZOLIN IN DEXTROSE 2-4 GM/100ML-% SOLUTION: Performed by: ORTHOPAEDIC SURGERY

## 2019-01-01 PROCEDURE — 85014 HEMATOCRIT: CPT | Performed by: INTERNAL MEDICINE

## 2019-01-01 PROCEDURE — 73502 X-RAY EXAM HIP UNI 2-3 VIEWS: CPT

## 2019-01-01 PROCEDURE — 25010000002 ENOXAPARIN PER 10 MG: Performed by: INTERNAL MEDICINE

## 2019-01-01 PROCEDURE — 97165 OT EVAL LOW COMPLEX 30 MIN: CPT

## 2019-01-01 PROCEDURE — A9270 NON-COVERED ITEM OR SERVICE: HCPCS | Performed by: NURSE PRACTITIONER

## 2019-01-01 PROCEDURE — 99239 HOSP IP/OBS DSCHRG MGMT >30: CPT | Performed by: INTERNAL MEDICINE

## 2019-01-01 PROCEDURE — 86901 BLOOD TYPING SEROLOGIC RH(D): CPT

## 2019-01-01 PROCEDURE — 85018 HEMOGLOBIN: CPT | Performed by: INTERNAL MEDICINE

## 2019-01-01 PROCEDURE — 99232 SBSQ HOSP IP/OBS MODERATE 35: CPT | Performed by: INTERNAL MEDICINE

## 2019-01-01 PROCEDURE — 80053 COMPREHEN METABOLIC PANEL: CPT | Performed by: NURSE PRACTITIONER

## 2019-01-01 PROCEDURE — 85027 COMPLETE CBC AUTOMATED: CPT | Performed by: INTERNAL MEDICINE

## 2019-01-01 PROCEDURE — 97112 NEUROMUSCULAR REEDUCATION: CPT

## 2019-01-01 PROCEDURE — 25010000002 MORPHINE SULFATE (PF) 2 MG/ML SOLUTION: Performed by: INTERNAL MEDICINE

## 2019-01-01 PROCEDURE — 80048 BASIC METABOLIC PNL TOTAL CA: CPT

## 2019-01-01 PROCEDURE — 81003 URINALYSIS AUTO W/O SCOPE: CPT | Performed by: NURSE PRACTITIONER

## 2019-01-01 PROCEDURE — 97116 GAIT TRAINING THERAPY: CPT

## 2019-01-01 PROCEDURE — 97110 THERAPEUTIC EXERCISES: CPT

## 2019-01-01 PROCEDURE — 74176 CT ABD & PELVIS W/O CONTRAST: CPT

## 2019-01-01 PROCEDURE — 76000 FLUOROSCOPY <1 HR PHYS/QHP: CPT

## 2019-01-01 PROCEDURE — 97530 THERAPEUTIC ACTIVITIES: CPT

## 2019-01-01 PROCEDURE — 25010000002 LORAZEPAM PER 2 MG: Performed by: INTERNAL MEDICINE

## 2019-01-01 PROCEDURE — 99285 EMERGENCY DEPT VISIT HI MDM: CPT

## 2019-01-01 PROCEDURE — 99233 SBSQ HOSP IP/OBS HIGH 50: CPT | Performed by: INTERNAL MEDICINE

## 2019-01-01 PROCEDURE — 25010000002 FENTANYL CITRATE (PF) 100 MCG/2ML SOLUTION: Performed by: ANESTHESIOLOGY

## 2019-01-01 PROCEDURE — 36415 COLL VENOUS BLD VENIPUNCTURE: CPT | Performed by: INTERNAL MEDICINE

## 2019-01-01 PROCEDURE — 85025 COMPLETE CBC W/AUTO DIFF WBC: CPT | Performed by: NURSE PRACTITIONER

## 2019-01-01 PROCEDURE — 86850 RBC ANTIBODY SCREEN: CPT | Performed by: NURSE PRACTITIONER

## 2019-01-01 PROCEDURE — 80048 BASIC METABOLIC PNL TOTAL CA: CPT | Performed by: INTERNAL MEDICINE

## 2019-01-01 PROCEDURE — 83735 ASSAY OF MAGNESIUM: CPT | Performed by: INTERNAL MEDICINE

## 2019-01-01 PROCEDURE — 25010000002 ONDANSETRON PER 1 MG: Performed by: ANESTHESIOLOGY

## 2019-01-01 PROCEDURE — 25010000002 ONDANSETRON PER 1 MG: Performed by: EMERGENCY MEDICINE

## 2019-01-01 PROCEDURE — 63710000001 DONEPEZIL 10 MG TABLET: Performed by: NURSE PRACTITIONER

## 2019-01-01 PROCEDURE — 63710000001 INSULIN LISPRO (HUMAN) PER 5 UNITS: Performed by: NURSE PRACTITIONER

## 2019-01-01 PROCEDURE — 93005 ELECTROCARDIOGRAM TRACING: CPT | Performed by: NURSE PRACTITIONER

## 2019-01-01 PROCEDURE — 36430 TRANSFUSION BLD/BLD COMPNT: CPT

## 2019-01-01 PROCEDURE — 84132 ASSAY OF SERUM POTASSIUM: CPT | Performed by: INTERNAL MEDICINE

## 2019-01-01 PROCEDURE — 86900 BLOOD TYPING SEROLOGIC ABO: CPT

## 2019-01-01 PROCEDURE — 25010000002 HYDROMORPHONE PER 4 MG: Performed by: ANESTHESIOLOGY

## 2019-01-01 PROCEDURE — 83605 ASSAY OF LACTIC ACID: CPT | Performed by: NURSE PRACTITIONER

## 2019-01-01 PROCEDURE — 25010000002 MAGNESIUM SULFATE 2 GM/50ML SOLUTION: Performed by: INTERNAL MEDICINE

## 2019-01-01 PROCEDURE — 80053 COMPREHEN METABOLIC PANEL: CPT | Performed by: INTERNAL MEDICINE

## 2019-01-01 PROCEDURE — 83036 HEMOGLOBIN GLYCOSYLATED A1C: CPT | Performed by: NURSE PRACTITIONER

## 2019-01-01 PROCEDURE — 72125 CT NECK SPINE W/O DYE: CPT

## 2019-01-01 PROCEDURE — 0QS706Z REPOSITION LEFT UPPER FEMUR WITH INTRAMEDULLARY INTERNAL FIXATION DEVICE, OPEN APPROACH: ICD-10-PCS | Performed by: ORTHOPAEDIC SURGERY

## 2019-01-01 PROCEDURE — 25010000002 DEXAMETHASONE PER 1 MG: Performed by: ANESTHESIOLOGY

## 2019-01-01 PROCEDURE — 86923 COMPATIBILITY TEST ELECTRIC: CPT

## 2019-01-01 PROCEDURE — 25010000002 HYDROMORPHONE PER 4 MG: Performed by: EMERGENCY MEDICINE

## 2019-01-01 PROCEDURE — 73700 CT LOWER EXTREMITY W/O DYE: CPT

## 2019-01-01 PROCEDURE — 97162 PT EVAL MOD COMPLEX 30 MIN: CPT

## 2019-01-01 PROCEDURE — 83880 ASSAY OF NATRIURETIC PEPTIDE: CPT | Performed by: NURSE PRACTITIONER

## 2019-01-01 PROCEDURE — C1713 ANCHOR/SCREW BN/BN,TIS/BN: HCPCS | Performed by: ORTHOPAEDIC SURGERY

## 2019-01-01 PROCEDURE — 99220 PR INITIAL OBSERVATION CARE/DAY 70 MINUTES: CPT | Performed by: INTERNAL MEDICINE

## 2019-01-01 PROCEDURE — 25010000002 PROPOFOL 10 MG/ML EMULSION: Performed by: ANESTHESIOLOGY

## 2019-01-01 PROCEDURE — 97530 THERAPEUTIC ACTIVITIES: CPT | Performed by: OCCUPATIONAL THERAPIST

## 2019-01-01 PROCEDURE — 25010000002 MORPHINE SULFATE (PF) 2 MG/ML SOLUTION: Performed by: NURSE PRACTITIONER

## 2019-01-01 PROCEDURE — 71250 CT THORAX DX C-: CPT

## 2019-01-01 PROCEDURE — 73552 X-RAY EXAM OF FEMUR 2/>: CPT

## 2019-01-01 PROCEDURE — 70450 CT HEAD/BRAIN W/O DYE: CPT

## 2019-01-01 PROCEDURE — P9016 RBC LEUKOCYTES REDUCED: HCPCS

## 2019-01-01 PROCEDURE — C1769 GUIDE WIRE: HCPCS | Performed by: ORTHOPAEDIC SURGERY

## 2019-01-01 PROCEDURE — 99024 POSTOP FOLLOW-UP VISIT: CPT | Performed by: PHYSICIAN ASSISTANT

## 2019-01-01 PROCEDURE — 63710000001 ACETAMINOPHEN 325 MG TABLET: Performed by: NURSE PRACTITIONER

## 2019-01-01 PROCEDURE — P9612 CATHETERIZE FOR URINE SPEC: HCPCS

## 2019-01-01 PROCEDURE — 86900 BLOOD TYPING SEROLOGIC ABO: CPT | Performed by: NURSE PRACTITIONER

## 2019-01-01 PROCEDURE — 86901 BLOOD TYPING SEROLOGIC RH(D): CPT | Performed by: NURSE PRACTITIONER

## 2019-01-01 PROCEDURE — 82550 ASSAY OF CK (CPK): CPT | Performed by: EMERGENCY MEDICINE

## 2019-01-01 PROCEDURE — 25010000002 MORPHINE SULFATE (PF) 2 MG/ML SOLUTION: Performed by: EMERGENCY MEDICINE

## 2019-01-01 PROCEDURE — 84100 ASSAY OF PHOSPHORUS: CPT | Performed by: INTERNAL MEDICINE

## 2019-01-01 PROCEDURE — 85610 PROTHROMBIN TIME: CPT | Performed by: NURSE PRACTITIONER

## 2019-01-01 PROCEDURE — 85025 COMPLETE CBC W/AUTO DIFF WBC: CPT | Performed by: INTERNAL MEDICINE

## 2019-01-01 PROCEDURE — 84484 ASSAY OF TROPONIN QUANT: CPT

## 2019-01-01 PROCEDURE — 80048 BASIC METABOLIC PNL TOTAL CA: CPT | Performed by: NURSE PRACTITIONER

## 2019-01-01 PROCEDURE — 85025 COMPLETE CBC W/AUTO DIFF WBC: CPT

## 2019-01-01 DEVICE — SCRW LK STRDRV TI 5X36MM STRL: Type: IMPLANTABLE DEVICE | Site: HIP | Status: FUNCTIONAL

## 2019-01-01 DEVICE — BLD FEM FIX HELI TFN ADV PERF 90MM STRL: Type: IMPLANTABLE DEVICE | Site: HIP | Status: FUNCTIONAL

## 2019-01-01 DEVICE — NAIL FEM TFN ADV PROX 135D SHT 11X170MM STRL: Type: IMPLANTABLE DEVICE | Site: HIP | Status: FUNCTIONAL

## 2019-01-01 RX ORDER — HYDROCHLOROTHIAZIDE 12.5 MG/1
12.5 TABLET ORAL DAILY
Start: 2019-01-01

## 2019-01-01 RX ORDER — NALOXONE HCL 0.4 MG/ML
0.4 VIAL (ML) INJECTION
Status: DISCONTINUED | OUTPATIENT
Start: 2019-01-01 | End: 2019-01-01 | Stop reason: HOSPADM

## 2019-01-01 RX ORDER — FENTANYL CITRATE 50 UG/ML
INJECTION, SOLUTION INTRAMUSCULAR; INTRAVENOUS AS NEEDED
Status: DISCONTINUED | OUTPATIENT
Start: 2019-01-01 | End: 2019-01-01 | Stop reason: SURG

## 2019-01-01 RX ORDER — HYDROMORPHONE HYDROCHLORIDE 1 MG/ML
0.5 INJECTION, SOLUTION INTRAMUSCULAR; INTRAVENOUS; SUBCUTANEOUS ONCE
Status: DISCONTINUED | OUTPATIENT
Start: 2019-01-01 | End: 2019-01-01

## 2019-01-01 RX ORDER — CEFAZOLIN SODIUM 2 G/100ML
2 INJECTION, SOLUTION INTRAVENOUS ONCE
Status: COMPLETED | OUTPATIENT
Start: 2019-01-01 | End: 2019-01-01

## 2019-01-01 RX ORDER — MAGNESIUM SULFATE HEPTAHYDRATE 40 MG/ML
2 INJECTION, SOLUTION INTRAVENOUS
Status: COMPLETED | OUTPATIENT
Start: 2019-01-01 | End: 2019-01-01

## 2019-01-01 RX ORDER — BUSPIRONE HYDROCHLORIDE 10 MG/1
5 TABLET ORAL 3 TIMES DAILY
Status: DISCONTINUED | OUTPATIENT
Start: 2019-01-01 | End: 2019-01-01 | Stop reason: HOSPADM

## 2019-01-01 RX ORDER — SODIUM CHLORIDE 9 MG/ML
50 INJECTION, SOLUTION INTRAVENOUS CONTINUOUS
Status: DISCONTINUED | OUTPATIENT
Start: 2019-01-01 | End: 2019-01-01 | Stop reason: HOSPADM

## 2019-01-01 RX ORDER — NALOXONE HCL 0.4 MG/ML
0.4 VIAL (ML) INJECTION
Status: DISCONTINUED | OUTPATIENT
Start: 2019-01-01 | End: 2019-01-01

## 2019-01-01 RX ORDER — LISINOPRIL 10 MG/1
10 TABLET ORAL
Status: DISCONTINUED | OUTPATIENT
Start: 2019-01-01 | End: 2019-01-01

## 2019-01-01 RX ORDER — SERTRALINE HYDROCHLORIDE 25 MG/1
25 TABLET, FILM COATED ORAL DAILY
COMMUNITY

## 2019-01-01 RX ORDER — ONDANSETRON 2 MG/ML
4 INJECTION INTRAMUSCULAR; INTRAVENOUS ONCE AS NEEDED
Status: DISCONTINUED | OUTPATIENT
Start: 2019-01-01 | End: 2019-01-01 | Stop reason: HOSPADM

## 2019-01-01 RX ORDER — MORPHINE SULFATE 2 MG/ML
1 INJECTION, SOLUTION INTRAMUSCULAR; INTRAVENOUS EVERY 4 HOURS PRN
Status: DISCONTINUED | OUTPATIENT
Start: 2019-01-01 | End: 2019-01-01

## 2019-01-01 RX ORDER — CEFAZOLIN SODIUM 2 G/100ML
2 INJECTION, SOLUTION INTRAVENOUS EVERY 8 HOURS
Status: COMPLETED | OUTPATIENT
Start: 2019-01-01 | End: 2019-01-01

## 2019-01-01 RX ORDER — MAGNESIUM SULFATE HEPTAHYDRATE 40 MG/ML
2 INJECTION, SOLUTION INTRAVENOUS ONCE
Status: COMPLETED | OUTPATIENT
Start: 2019-01-01 | End: 2019-01-01

## 2019-01-01 RX ORDER — QUETIAPINE FUMARATE 25 MG/1
25 TABLET, FILM COATED ORAL 2 TIMES DAILY
Status: DISCONTINUED | OUTPATIENT
Start: 2019-01-01 | End: 2019-01-01 | Stop reason: HOSPADM

## 2019-01-01 RX ORDER — LIDOCAINE HYDROCHLORIDE 10 MG/ML
INJECTION, SOLUTION INFILTRATION; PERINEURAL AS NEEDED
Status: DISCONTINUED | OUTPATIENT
Start: 2019-01-01 | End: 2019-01-01 | Stop reason: SURG

## 2019-01-01 RX ORDER — HYDROCODONE BITARTRATE AND ACETAMINOPHEN 7.5; 325 MG/1; MG/1
1 TABLET ORAL EVERY 4 HOURS PRN
Qty: 12 TABLET | Refills: 0 | Status: SHIPPED | OUTPATIENT
Start: 2019-01-01 | End: 2019-01-01

## 2019-01-01 RX ORDER — ONDANSETRON 4 MG/1
4 TABLET, FILM COATED ORAL EVERY 6 HOURS PRN
Status: DISCONTINUED | OUTPATIENT
Start: 2019-01-01 | End: 2019-01-01 | Stop reason: HOSPADM

## 2019-01-01 RX ORDER — HYDROMORPHONE HYDROCHLORIDE 1 MG/ML
0.25 INJECTION, SOLUTION INTRAMUSCULAR; INTRAVENOUS; SUBCUTANEOUS ONCE
Status: DISCONTINUED | OUTPATIENT
Start: 2019-01-01 | End: 2019-01-01

## 2019-01-01 RX ORDER — NICOTINE POLACRILEX 4 MG
15 LOZENGE BUCCAL
Status: DISCONTINUED | OUTPATIENT
Start: 2019-01-01 | End: 2019-01-01 | Stop reason: HOSPADM

## 2019-01-01 RX ORDER — ONDANSETRON 2 MG/ML
4 INJECTION INTRAMUSCULAR; INTRAVENOUS EVERY 6 HOURS PRN
Status: DISCONTINUED | OUTPATIENT
Start: 2019-01-01 | End: 2019-01-01 | Stop reason: HOSPADM

## 2019-01-01 RX ORDER — SUCRALFATE 1 G/1
1 TABLET ORAL
Status: DISCONTINUED | OUTPATIENT
Start: 2019-01-01 | End: 2019-01-01 | Stop reason: HOSPADM

## 2019-01-01 RX ORDER — BUSPIRONE HYDROCHLORIDE 5 MG/1
5 TABLET ORAL 3 TIMES DAILY
COMMUNITY

## 2019-01-01 RX ORDER — SODIUM CHLORIDE, SODIUM LACTATE, POTASSIUM CHLORIDE, CALCIUM CHLORIDE 600; 310; 30; 20 MG/100ML; MG/100ML; MG/100ML; MG/100ML
INJECTION, SOLUTION INTRAVENOUS CONTINUOUS PRN
Status: DISCONTINUED | OUTPATIENT
Start: 2019-01-01 | End: 2019-01-01 | Stop reason: SURG

## 2019-01-01 RX ORDER — ENALAPRILAT 2.5 MG/2ML
1.25 INJECTION INTRAVENOUS EVERY 6 HOURS PRN
Status: DISCONTINUED | OUTPATIENT
Start: 2019-01-01 | End: 2019-01-01

## 2019-01-01 RX ORDER — POTASSIUM CHLORIDE 1.5 G/1.77G
40 POWDER, FOR SOLUTION ORAL AS NEEDED
Status: DISCONTINUED | OUTPATIENT
Start: 2019-01-01 | End: 2019-01-01 | Stop reason: HOSPADM

## 2019-01-01 RX ORDER — HYDROCODONE BITARTRATE AND ACETAMINOPHEN 7.5; 325 MG/1; MG/1
1 TABLET ORAL EVERY 4 HOURS PRN
Status: DISCONTINUED | OUTPATIENT
Start: 2019-01-01 | End: 2019-01-01 | Stop reason: HOSPADM

## 2019-01-01 RX ORDER — DEXTROSE MONOHYDRATE 25 G/50ML
25 INJECTION, SOLUTION INTRAVENOUS
Status: DISCONTINUED | OUTPATIENT
Start: 2019-01-01 | End: 2019-01-01 | Stop reason: HOSPADM

## 2019-01-01 RX ORDER — ONDANSETRON 2 MG/ML
4 INJECTION INTRAMUSCULAR; INTRAVENOUS ONCE
Status: COMPLETED | OUTPATIENT
Start: 2019-01-01 | End: 2019-01-01

## 2019-01-01 RX ORDER — POTASSIUM CHLORIDE 750 MG/1
10 CAPSULE, EXTENDED RELEASE ORAL DAILY
Status: DISCONTINUED | OUTPATIENT
Start: 2019-01-01 | End: 2019-01-01 | Stop reason: HOSPADM

## 2019-01-01 RX ORDER — ACETAMINOPHEN 325 MG/1
650 TABLET ORAL EVERY 4 HOURS PRN
Status: DISCONTINUED | OUTPATIENT
Start: 2019-01-01 | End: 2019-01-01 | Stop reason: HOSPADM

## 2019-01-01 RX ORDER — MIRTAZAPINE 15 MG/1
15 TABLET, FILM COATED ORAL NIGHTLY
COMMUNITY

## 2019-01-01 RX ORDER — MINERAL OIL AND WHITE PETROLATUM 150; 830 MG/G; MG/G
OINTMENT OPHTHALMIC
Status: DISCONTINUED | OUTPATIENT
Start: 2019-01-01 | End: 2019-01-01 | Stop reason: HOSPADM

## 2019-01-01 RX ORDER — DONEPEZIL HYDROCHLORIDE 10 MG/1
20 TABLET, FILM COATED ORAL NIGHTLY
Status: DISCONTINUED | OUTPATIENT
Start: 2019-01-01 | End: 2019-01-01 | Stop reason: HOSPADM

## 2019-01-01 RX ORDER — LISINOPRIL 40 MG/1
40 TABLET ORAL
Status: DISCONTINUED | OUTPATIENT
Start: 2019-01-01 | End: 2019-01-01 | Stop reason: HOSPADM

## 2019-01-01 RX ORDER — ONDANSETRON 4 MG/1
4 TABLET, FILM COATED ORAL EVERY 8 HOURS PRN
COMMUNITY

## 2019-01-01 RX ORDER — LANOLIN ALCOHOL/MO/W.PET/CERES
1000 CREAM (GRAM) TOPICAL DAILY
Status: DISCONTINUED | OUTPATIENT
Start: 2019-01-01 | End: 2019-01-01 | Stop reason: HOSPADM

## 2019-01-01 RX ORDER — MINERAL OIL AND WHITE PETROLATUM 150; 830 MG/G; MG/G
OINTMENT OPHTHALMIC
Start: 2019-01-01

## 2019-01-01 RX ORDER — ENALAPRILAT 2.5 MG/2ML
2.5 INJECTION INTRAVENOUS EVERY 6 HOURS PRN
Status: DISCONTINUED | OUTPATIENT
Start: 2019-01-01 | End: 2019-01-01 | Stop reason: HOSPADM

## 2019-01-01 RX ORDER — LOPERAMIDE HYDROCHLORIDE 2 MG/1
2 CAPSULE ORAL 2 TIMES DAILY PRN
COMMUNITY

## 2019-01-01 RX ORDER — PROPOFOL 10 MG/ML
VIAL (ML) INTRAVENOUS AS NEEDED
Status: DISCONTINUED | OUTPATIENT
Start: 2019-01-01 | End: 2019-01-01 | Stop reason: SURG

## 2019-01-01 RX ORDER — DOCUSATE SODIUM 100 MG/1
100 CAPSULE, LIQUID FILLED ORAL DAILY
Status: DISCONTINUED | OUTPATIENT
Start: 2019-01-01 | End: 2019-01-01 | Stop reason: HOSPADM

## 2019-01-01 RX ORDER — ASPIRIN 81 MG/1
81 TABLET, CHEWABLE ORAL DAILY
Status: DISCONTINUED | OUTPATIENT
Start: 2019-01-01 | End: 2019-01-01 | Stop reason: HOSPADM

## 2019-01-01 RX ORDER — LISINOPRIL 20 MG/1
20 TABLET ORAL
Status: CANCELLED | OUTPATIENT
Start: 2019-01-01

## 2019-01-01 RX ORDER — SODIUM CHLORIDE 0.9 % (FLUSH) 0.9 %
3 SYRINGE (ML) INJECTION EVERY 12 HOURS SCHEDULED
Status: DISCONTINUED | OUTPATIENT
Start: 2019-01-01 | End: 2019-01-01 | Stop reason: HOSPADM

## 2019-01-01 RX ORDER — DEXAMETHASONE SODIUM PHOSPHATE 10 MG/ML
INJECTION INTRAMUSCULAR; INTRAVENOUS AS NEEDED
Status: DISCONTINUED | OUTPATIENT
Start: 2019-01-01 | End: 2019-01-01 | Stop reason: SURG

## 2019-01-01 RX ORDER — LISINOPRIL 40 MG/1
40 TABLET ORAL
Status: DISCONTINUED | OUTPATIENT
Start: 2019-01-01 | End: 2019-01-01

## 2019-01-01 RX ORDER — FENTANYL CITRATE 50 UG/ML
50 INJECTION, SOLUTION INTRAMUSCULAR; INTRAVENOUS
Status: DISCONTINUED | OUTPATIENT
Start: 2019-01-01 | End: 2019-01-01 | Stop reason: HOSPADM

## 2019-01-01 RX ORDER — FLUOXETINE HYDROCHLORIDE 20 MG/1
20 CAPSULE ORAL DAILY
Status: DISCONTINUED | OUTPATIENT
Start: 2019-01-01 | End: 2019-01-01 | Stop reason: HOSPADM

## 2019-01-01 RX ORDER — HYDROMORPHONE HYDROCHLORIDE 1 MG/ML
0.5 INJECTION, SOLUTION INTRAMUSCULAR; INTRAVENOUS; SUBCUTANEOUS ONCE
Status: COMPLETED | OUTPATIENT
Start: 2019-01-01 | End: 2019-01-01

## 2019-01-01 RX ORDER — QUETIAPINE FUMARATE 25 MG/1
25 TABLET, FILM COATED ORAL EVERY 12 HOURS PRN
Status: DISCONTINUED | OUTPATIENT
Start: 2019-01-01 | End: 2019-01-01 | Stop reason: HOSPADM

## 2019-01-01 RX ORDER — POTASSIUM CHLORIDE 750 MG/1
40 CAPSULE, EXTENDED RELEASE ORAL AS NEEDED
Status: DISCONTINUED | OUTPATIENT
Start: 2019-01-01 | End: 2019-01-01 | Stop reason: HOSPADM

## 2019-01-01 RX ORDER — POTASSIUM CHLORIDE 7.45 MG/ML
10 INJECTION INTRAVENOUS
Status: DISCONTINUED | OUTPATIENT
Start: 2019-01-01 | End: 2019-01-01 | Stop reason: HOSPADM

## 2019-01-01 RX ORDER — HYDROCHLOROTHIAZIDE 12.5 MG/1
12.5 TABLET ORAL DAILY
Status: DISCONTINUED | OUTPATIENT
Start: 2019-01-01 | End: 2019-01-01 | Stop reason: HOSPADM

## 2019-01-01 RX ORDER — DOCUSATE SODIUM 100 MG/1
100 CAPSULE, LIQUID FILLED ORAL 2 TIMES DAILY PRN
Status: DISCONTINUED | OUTPATIENT
Start: 2019-01-01 | End: 2019-01-01 | Stop reason: HOSPADM

## 2019-01-01 RX ORDER — HYDROMORPHONE HYDROCHLORIDE 1 MG/ML
0.5 INJECTION, SOLUTION INTRAMUSCULAR; INTRAVENOUS; SUBCUTANEOUS
Status: DISCONTINUED | OUTPATIENT
Start: 2019-01-01 | End: 2019-01-01 | Stop reason: HOSPADM

## 2019-01-01 RX ORDER — QUETIAPINE FUMARATE 25 MG/1
25 TABLET, FILM COATED ORAL EVERY 12 HOURS SCHEDULED
Status: DISCONTINUED | OUTPATIENT
Start: 2019-01-01 | End: 2019-01-01

## 2019-01-01 RX ORDER — QUETIAPINE FUMARATE 25 MG/1
50 TABLET, FILM COATED ORAL NIGHTLY
Status: DISCONTINUED | OUTPATIENT
Start: 2019-01-01 | End: 2019-01-01 | Stop reason: HOSPADM

## 2019-01-01 RX ORDER — QUETIAPINE FUMARATE 25 MG/1
25 TABLET, FILM COATED ORAL NIGHTLY
Status: DISCONTINUED | OUTPATIENT
Start: 2019-01-01 | End: 2019-01-01

## 2019-01-01 RX ORDER — MAGNESIUM HYDROXIDE 1200 MG/15ML
LIQUID ORAL AS NEEDED
Status: DISCONTINUED | OUTPATIENT
Start: 2019-01-01 | End: 2019-01-01 | Stop reason: HOSPADM

## 2019-01-01 RX ORDER — MORPHINE SULFATE 2 MG/ML
2 INJECTION, SOLUTION INTRAMUSCULAR; INTRAVENOUS ONCE
Status: COMPLETED | OUTPATIENT
Start: 2019-01-01 | End: 2019-01-01

## 2019-01-01 RX ORDER — QUETIAPINE FUMARATE 25 MG/1
25 TABLET, FILM COATED ORAL EVERY 8 HOURS SCHEDULED
Status: DISCONTINUED | OUTPATIENT
Start: 2019-01-01 | End: 2019-01-01

## 2019-01-01 RX ORDER — LORAZEPAM 2 MG/ML
0.25 INJECTION INTRAMUSCULAR EVERY 6 HOURS PRN
Status: DISCONTINUED | OUTPATIENT
Start: 2019-01-01 | End: 2019-01-01 | Stop reason: HOSPADM

## 2019-01-01 RX ORDER — SODIUM CHLORIDE 0.9 % (FLUSH) 0.9 %
10 SYRINGE (ML) INJECTION AS NEEDED
Status: DISCONTINUED | OUTPATIENT
Start: 2019-01-01 | End: 2019-01-01 | Stop reason: HOSPADM

## 2019-01-01 RX ORDER — SODIUM CHLORIDE 0.9 % (FLUSH) 0.9 %
3-10 SYRINGE (ML) INJECTION AS NEEDED
Status: DISCONTINUED | OUTPATIENT
Start: 2019-01-01 | End: 2019-01-01 | Stop reason: HOSPADM

## 2019-01-01 RX ORDER — HYDROMORPHONE HYDROCHLORIDE 1 MG/ML
0.25 INJECTION, SOLUTION INTRAMUSCULAR; INTRAVENOUS; SUBCUTANEOUS ONCE
Status: COMPLETED | OUTPATIENT
Start: 2019-01-01 | End: 2019-01-01

## 2019-01-01 RX ORDER — MORPHINE SULFATE 2 MG/ML
2 INJECTION, SOLUTION INTRAMUSCULAR; INTRAVENOUS EVERY 4 HOURS PRN
Status: DISCONTINUED | OUTPATIENT
Start: 2019-01-01 | End: 2019-01-01 | Stop reason: HOSPADM

## 2019-01-01 RX ORDER — ASPIRIN 81 MG/1
81 TABLET, CHEWABLE ORAL DAILY
Status: DISCONTINUED | OUTPATIENT
Start: 2019-01-01 | End: 2019-01-01

## 2019-01-01 RX ORDER — QUETIAPINE FUMARATE 25 MG/1
12.5 TABLET, FILM COATED ORAL AS NEEDED
Status: COMPLETED | OUTPATIENT
Start: 2019-01-01 | End: 2019-01-01

## 2019-01-01 RX ORDER — ATORVASTATIN CALCIUM 40 MG/1
40 TABLET, FILM COATED ORAL NIGHTLY
Status: DISCONTINUED | OUTPATIENT
Start: 2019-01-01 | End: 2019-01-01 | Stop reason: HOSPADM

## 2019-01-01 RX ORDER — ONDANSETRON 2 MG/ML
INJECTION INTRAMUSCULAR; INTRAVENOUS AS NEEDED
Status: DISCONTINUED | OUTPATIENT
Start: 2019-01-01 | End: 2019-01-01 | Stop reason: SURG

## 2019-01-01 RX ADMIN — INSULIN LISPRO 2 UNITS: 100 INJECTION, SOLUTION INTRAVENOUS; SUBCUTANEOUS at 12:29

## 2019-01-01 RX ADMIN — SUCRALFATE 1 G: 1 TABLET ORAL at 08:45

## 2019-01-01 RX ADMIN — QUETIAPINE FUMARATE 50 MG: 25 TABLET ORAL at 21:02

## 2019-01-01 RX ADMIN — SUCRALFATE 1 G: 1 TABLET ORAL at 10:48

## 2019-01-01 RX ADMIN — BUSPIRONE HYDROCHLORIDE 5 MG: 10 TABLET ORAL at 16:24

## 2019-01-01 RX ADMIN — ENOXAPARIN SODIUM 40 MG: 40 INJECTION SUBCUTANEOUS at 09:14

## 2019-01-01 RX ADMIN — INSULIN LISPRO 3 UNITS: 100 INJECTION, SOLUTION INTRAVENOUS; SUBCUTANEOUS at 21:53

## 2019-01-01 RX ADMIN — SUCRALFATE 1 G: 1 TABLET ORAL at 12:40

## 2019-01-01 RX ADMIN — LORAZEPAM 0.25 MG: 2 INJECTION INTRAMUSCULAR; INTRAVENOUS at 22:29

## 2019-01-01 RX ADMIN — POTASSIUM CHLORIDE 40 MEQ: 1.5 POWDER, FOR SOLUTION ORAL at 06:17

## 2019-01-01 RX ADMIN — SUCRALFATE 1 G: 1 TABLET ORAL at 16:39

## 2019-01-01 RX ADMIN — ATORVASTATIN CALCIUM 40 MG: 40 TABLET, FILM COATED ORAL at 21:53

## 2019-01-01 RX ADMIN — DONEPEZIL HYDROCHLORIDE 20 MG: 10 TABLET, FILM COATED ORAL at 19:46

## 2019-01-01 RX ADMIN — SODIUM CHLORIDE, PRESERVATIVE FREE 3 ML: 5 INJECTION INTRAVENOUS at 12:29

## 2019-01-01 RX ADMIN — MORPHINE SULFATE 2 MG: 2 INJECTION, SOLUTION INTRAMUSCULAR; INTRAVENOUS at 00:11

## 2019-01-01 RX ADMIN — SODIUM CHLORIDE 75 ML/HR: 9 INJECTION, SOLUTION INTRAVENOUS at 10:14

## 2019-01-01 RX ADMIN — HYDROMORPHONE HYDROCHLORIDE 0.25 MG: 1 INJECTION, SOLUTION INTRAMUSCULAR; INTRAVENOUS; SUBCUTANEOUS at 21:00

## 2019-01-01 RX ADMIN — BUSPIRONE HYDROCHLORIDE 5 MG: 10 TABLET ORAL at 20:38

## 2019-01-01 RX ADMIN — SUCRALFATE 1 G: 1 TABLET ORAL at 20:50

## 2019-01-01 RX ADMIN — BUSPIRONE HYDROCHLORIDE 5 MG: 10 TABLET ORAL at 07:55

## 2019-01-01 RX ADMIN — LISINOPRIL 10 MG: 10 TABLET ORAL at 12:06

## 2019-01-01 RX ADMIN — QUETIAPINE FUMARATE 25 MG: 25 TABLET ORAL at 05:07

## 2019-01-01 RX ADMIN — ASPIRIN 81 MG 81 MG: 81 TABLET ORAL at 10:48

## 2019-01-01 RX ADMIN — BUSPIRONE HYDROCHLORIDE 5 MG: 10 TABLET ORAL at 21:03

## 2019-01-01 RX ADMIN — QUETIAPINE FUMARATE 25 MG: 25 TABLET ORAL at 16:54

## 2019-01-01 RX ADMIN — ATORVASTATIN CALCIUM 40 MG: 40 TABLET, FILM COATED ORAL at 21:40

## 2019-01-01 RX ADMIN — ATORVASTATIN CALCIUM 40 MG: 40 TABLET, FILM COATED ORAL at 20:38

## 2019-01-01 RX ADMIN — QUETIAPINE FUMARATE 25 MG: 25 TABLET ORAL at 19:46

## 2019-01-01 RX ADMIN — CEFAZOLIN SODIUM 2 G: 2 INJECTION, SOLUTION INTRAVENOUS at 05:39

## 2019-01-01 RX ADMIN — MAGNESIUM SULFATE HEPTAHYDRATE 2 G: 40 INJECTION, SOLUTION INTRAVENOUS at 19:12

## 2019-01-01 RX ADMIN — CYANOCOBALAMIN TAB 1000 MCG 1000 MCG: 1000 TAB at 08:46

## 2019-01-01 RX ADMIN — POTASSIUM CHLORIDE 10 MEQ: 750 CAPSULE, EXTENDED RELEASE ORAL at 07:55

## 2019-01-01 RX ADMIN — POTASSIUM CHLORIDE 10 MEQ: 750 CAPSULE, EXTENDED RELEASE ORAL at 09:12

## 2019-01-01 RX ADMIN — POTASSIUM CHLORIDE 40 MEQ: 750 CAPSULE, EXTENDED RELEASE ORAL at 12:22

## 2019-01-01 RX ADMIN — HYDROCHLOROTHIAZIDE 12.5 MG: 12.5 TABLET ORAL at 16:53

## 2019-01-01 RX ADMIN — DOCUSATE SODIUM 100 MG: 100 CAPSULE, LIQUID FILLED ORAL at 09:34

## 2019-01-01 RX ADMIN — SODIUM CHLORIDE, PRESERVATIVE FREE 3 ML: 5 INJECTION INTRAVENOUS at 21:40

## 2019-01-01 RX ADMIN — LISINOPRIL 10 MG: 10 TABLET ORAL at 10:43

## 2019-01-01 RX ADMIN — INSULIN LISPRO 2 UNITS: 100 INJECTION, SOLUTION INTRAVENOUS; SUBCUTANEOUS at 12:25

## 2019-01-01 RX ADMIN — ATORVASTATIN CALCIUM 40 MG: 40 TABLET, FILM COATED ORAL at 21:43

## 2019-01-01 RX ADMIN — BUSPIRONE HYDROCHLORIDE 5 MG: 10 TABLET ORAL at 21:43

## 2019-01-01 RX ADMIN — FENTANYL CITRATE 50 MCG: 50 INJECTION, SOLUTION INTRAMUSCULAR; INTRAVENOUS at 14:45

## 2019-01-01 RX ADMIN — MORPHINE SULFATE 2 MG: 2 INJECTION, SOLUTION INTRAMUSCULAR; INTRAVENOUS at 16:40

## 2019-01-01 RX ADMIN — BUSPIRONE HYDROCHLORIDE 5 MG: 10 TABLET ORAL at 10:47

## 2019-01-01 RX ADMIN — TRANEXAMIC ACID 1000 MG: 100 INJECTION, SOLUTION INTRAVENOUS at 13:57

## 2019-01-01 RX ADMIN — ENALAPRILAT 2.5 MG: 1.25 INJECTION INTRAVENOUS at 15:28

## 2019-01-01 RX ADMIN — INSULIN LISPRO 2 UNITS: 100 INJECTION, SOLUTION INTRAVENOUS; SUBCUTANEOUS at 12:11

## 2019-01-01 RX ADMIN — DONEPEZIL HYDROCHLORIDE 20 MG: 10 TABLET, FILM COATED ORAL at 21:43

## 2019-01-01 RX ADMIN — ASPIRIN 81 MG 81 MG: 81 TABLET ORAL at 08:50

## 2019-01-01 RX ADMIN — SODIUM CHLORIDE 125 ML/HR: 9 INJECTION, SOLUTION INTRAVENOUS at 23:40

## 2019-01-01 RX ADMIN — QUETIAPINE FUMARATE 25 MG: 25 TABLET ORAL at 23:07

## 2019-01-01 RX ADMIN — SUCRALFATE 1 G: 1 TABLET ORAL at 06:28

## 2019-01-01 RX ADMIN — SERTRALINE HYDROCHLORIDE 25 MG: 50 TABLET ORAL at 17:21

## 2019-01-01 RX ADMIN — MORPHINE SULFATE 2 MG: 2 INJECTION, SOLUTION INTRAMUSCULAR; INTRAVENOUS at 22:04

## 2019-01-01 RX ADMIN — DEXAMETHASONE SODIUM PHOSPHATE 4 MG: 10 INJECTION INTRAMUSCULAR; INTRAVENOUS at 13:24

## 2019-01-01 RX ADMIN — FLUOXETINE HYDROCHLORIDE 20 MG: 20 CAPSULE ORAL at 08:15

## 2019-01-01 RX ADMIN — PROPOFOL 100 MG: 10 INJECTION, EMULSION INTRAVENOUS at 13:19

## 2019-01-01 RX ADMIN — QUETIAPINE FUMARATE 25 MG: 25 TABLET ORAL at 06:28

## 2019-01-01 RX ADMIN — SERTRALINE HYDROCHLORIDE 25 MG: 50 TABLET ORAL at 07:56

## 2019-01-01 RX ADMIN — POTASSIUM CHLORIDE 10 MEQ: 750 CAPSULE, EXTENDED RELEASE ORAL at 08:14

## 2019-01-01 RX ADMIN — POTASSIUM CHLORIDE 10 MEQ: 750 CAPSULE, EXTENDED RELEASE ORAL at 08:50

## 2019-01-01 RX ADMIN — HYDROCODONE BITARTRATE AND ACETAMINOPHEN 1 TABLET: 7.5; 325 TABLET ORAL at 09:24

## 2019-01-01 RX ADMIN — SUCRALFATE 1 G: 1 TABLET ORAL at 09:12

## 2019-01-01 RX ADMIN — BUSPIRONE HYDROCHLORIDE 5 MG: 10 TABLET ORAL at 08:15

## 2019-01-01 RX ADMIN — ACETAMINOPHEN 650 MG: 325 TABLET ORAL at 08:03

## 2019-01-01 RX ADMIN — SUCRALFATE 1 G: 1 TABLET ORAL at 21:53

## 2019-01-01 RX ADMIN — SODIUM CHLORIDE, PRESERVATIVE FREE 3 ML: 5 INJECTION INTRAVENOUS at 19:48

## 2019-01-01 RX ADMIN — ONDANSETRON 4 MG: 2 INJECTION INTRAMUSCULAR; INTRAVENOUS at 22:12

## 2019-01-01 RX ADMIN — HYDROMORPHONE HYDROCHLORIDE 0.5 MG: 1 INJECTION, SOLUTION INTRAMUSCULAR; INTRAVENOUS; SUBCUTANEOUS at 19:17

## 2019-01-01 RX ADMIN — SODIUM CHLORIDE 75 ML/HR: 9 INJECTION, SOLUTION INTRAVENOUS at 21:49

## 2019-01-01 RX ADMIN — QUETIAPINE FUMARATE 25 MG: 25 TABLET ORAL at 21:53

## 2019-01-01 RX ADMIN — SUCRALFATE 1 G: 1 TABLET ORAL at 16:19

## 2019-01-01 RX ADMIN — MORPHINE SULFATE 2 MG: 2 INJECTION, SOLUTION INTRAMUSCULAR; INTRAVENOUS at 07:30

## 2019-01-01 RX ADMIN — ATORVASTATIN CALCIUM 40 MG: 40 TABLET, FILM COATED ORAL at 21:02

## 2019-01-01 RX ADMIN — ASPIRIN 81 MG 81 MG: 81 TABLET ORAL at 08:15

## 2019-01-01 RX ADMIN — POTASSIUM CHLORIDE 40 MEQ: 1.5 POWDER, FOR SOLUTION ORAL at 14:03

## 2019-01-01 RX ADMIN — ASPIRIN 81 MG 81 MG: 81 TABLET ORAL at 08:45

## 2019-01-01 RX ADMIN — MAGNESIUM SULFATE HEPTAHYDRATE 2 G: 40 INJECTION, SOLUTION INTRAVENOUS at 16:03

## 2019-01-01 RX ADMIN — ATORVASTATIN CALCIUM 40 MG: 40 TABLET, FILM COATED ORAL at 20:50

## 2019-01-01 RX ADMIN — POTASSIUM CHLORIDE 40 MEQ: 1.5 POWDER, FOR SOLUTION ORAL at 10:49

## 2019-01-01 RX ADMIN — FLUOXETINE HYDROCHLORIDE 20 MG: 20 CAPSULE ORAL at 09:23

## 2019-01-01 RX ADMIN — ENOXAPARIN SODIUM 40 MG: 40 INJECTION SUBCUTANEOUS at 08:13

## 2019-01-01 RX ADMIN — CYANOCOBALAMIN TAB 1000 MCG 1000 MCG: 1000 TAB at 08:14

## 2019-01-01 RX ADMIN — MORPHINE SULFATE 2 MG: 2 INJECTION, SOLUTION INTRAMUSCULAR; INTRAVENOUS at 22:13

## 2019-01-01 RX ADMIN — BUSPIRONE HYDROCHLORIDE 5 MG: 10 TABLET ORAL at 08:50

## 2019-01-01 RX ADMIN — SUCRALFATE 1 G: 1 TABLET ORAL at 09:32

## 2019-01-01 RX ADMIN — LISINOPRIL 40 MG: 40 TABLET ORAL at 09:12

## 2019-01-01 RX ADMIN — FLUOXETINE HYDROCHLORIDE 20 MG: 20 CAPSULE ORAL at 10:49

## 2019-01-01 RX ADMIN — BUSPIRONE HYDROCHLORIDE 5 MG: 10 TABLET ORAL at 08:43

## 2019-01-01 RX ADMIN — HYDROCODONE BITARTRATE AND ACETAMINOPHEN 1 TABLET: 7.5; 325 TABLET ORAL at 05:04

## 2019-01-01 RX ADMIN — SUCRALFATE 1 G: 1 TABLET ORAL at 16:54

## 2019-01-01 RX ADMIN — BUSPIRONE HYDROCHLORIDE 5 MG: 10 TABLET ORAL at 23:06

## 2019-01-01 RX ADMIN — ATORVASTATIN CALCIUM 40 MG: 40 TABLET, FILM COATED ORAL at 23:07

## 2019-01-01 RX ADMIN — HYDROCODONE BITARTRATE AND ACETAMINOPHEN 1 TABLET: 7.5; 325 TABLET ORAL at 16:22

## 2019-01-01 RX ADMIN — BUSPIRONE HYDROCHLORIDE 5 MG: 10 TABLET ORAL at 17:21

## 2019-01-01 RX ADMIN — DONEPEZIL HYDROCHLORIDE 20 MG: 10 TABLET, FILM COATED ORAL at 21:39

## 2019-01-01 RX ADMIN — CYANOCOBALAMIN TAB 1000 MCG 1000 MCG: 1000 TAB at 08:50

## 2019-01-01 RX ADMIN — LISINOPRIL 10 MG: 10 TABLET ORAL at 12:40

## 2019-01-01 RX ADMIN — QUETIAPINE FUMARATE 25 MG: 25 TABLET ORAL at 14:01

## 2019-01-01 RX ADMIN — CYANOCOBALAMIN TAB 1000 MCG 1000 MCG: 1000 TAB at 10:48

## 2019-01-01 RX ADMIN — CYANOCOBALAMIN TAB 1000 MCG 1000 MCG: 1000 TAB at 09:23

## 2019-01-01 RX ADMIN — MORPHINE SULFATE 1 MG: 2 INJECTION, SOLUTION INTRAMUSCULAR; INTRAVENOUS at 00:00

## 2019-01-01 RX ADMIN — QUETIAPINE FUMARATE 25 MG: 25 TABLET ORAL at 21:40

## 2019-01-01 RX ADMIN — LORAZEPAM 0.25 MG: 2 INJECTION INTRAMUSCULAR; INTRAVENOUS at 13:41

## 2019-01-01 RX ADMIN — MAGNESIUM SULFATE HEPTAHYDRATE 2 G: 40 INJECTION, SOLUTION INTRAVENOUS at 21:42

## 2019-01-01 RX ADMIN — SUCRALFATE 1 G: 1 TABLET ORAL at 17:21

## 2019-01-01 RX ADMIN — MORPHINE SULFATE 2 MG: 2 INJECTION, SOLUTION INTRAMUSCULAR; INTRAVENOUS at 00:56

## 2019-01-01 RX ADMIN — SODIUM CHLORIDE 75 ML/HR: 9 INJECTION, SOLUTION INTRAVENOUS at 12:29

## 2019-01-01 RX ADMIN — LISINOPRIL 10 MG: 10 TABLET ORAL at 07:54

## 2019-01-01 RX ADMIN — ASPIRIN 81 MG 81 MG: 81 TABLET ORAL at 10:10

## 2019-01-01 RX ADMIN — POTASSIUM CHLORIDE 10 MEQ: 750 CAPSULE, EXTENDED RELEASE ORAL at 08:45

## 2019-01-01 RX ADMIN — SUCRALFATE 1 G: 1 TABLET ORAL at 23:07

## 2019-01-01 RX ADMIN — MORPHINE SULFATE 2 MG: 2 INJECTION, SOLUTION INTRAMUSCULAR; INTRAVENOUS at 14:39

## 2019-01-01 RX ADMIN — SUCRALFATE 1 G: 1 TABLET ORAL at 12:29

## 2019-01-01 RX ADMIN — FLUOXETINE HYDROCHLORIDE 20 MG: 20 CAPSULE ORAL at 08:50

## 2019-01-01 RX ADMIN — HYDROCODONE BITARTRATE AND ACETAMINOPHEN 1 TABLET: 7.5; 325 TABLET ORAL at 16:54

## 2019-01-01 RX ADMIN — SUCRALFATE 1 G: 1 TABLET ORAL at 08:49

## 2019-01-01 RX ADMIN — TRANEXAMIC ACID 1000 MG: 100 INJECTION, SOLUTION INTRAVENOUS at 13:25

## 2019-01-01 RX ADMIN — SODIUM CHLORIDE 50 ML/HR: 9 INJECTION, SOLUTION INTRAVENOUS at 08:46

## 2019-01-01 RX ADMIN — MORPHINE SULFATE 2 MG: 2 INJECTION, SOLUTION INTRAMUSCULAR; INTRAVENOUS at 04:02

## 2019-01-01 RX ADMIN — DONEPEZIL HYDROCHLORIDE 20 MG: 10 TABLET, FILM COATED ORAL at 21:02

## 2019-01-01 RX ADMIN — INSULIN LISPRO 2 UNITS: 100 INJECTION, SOLUTION INTRAVENOUS; SUBCUTANEOUS at 07:57

## 2019-01-01 RX ADMIN — SODIUM CHLORIDE, PRESERVATIVE FREE 3 ML: 5 INJECTION INTRAVENOUS at 20:50

## 2019-01-01 RX ADMIN — QUETIAPINE FUMARATE 25 MG: 25 TABLET ORAL at 09:43

## 2019-01-01 RX ADMIN — QUETIAPINE FUMARATE 25 MG: 25 TABLET ORAL at 20:46

## 2019-01-01 RX ADMIN — CYANOCOBALAMIN TAB 1000 MCG 1000 MCG: 1000 TAB at 08:44

## 2019-01-01 RX ADMIN — DONEPEZIL HYDROCHLORIDE 20 MG: 10 TABLET, FILM COATED ORAL at 21:53

## 2019-01-01 RX ADMIN — HYDROCHLOROTHIAZIDE 12.5 MG: 12.5 TABLET ORAL at 08:14

## 2019-01-01 RX ADMIN — INSULIN LISPRO 2 UNITS: 100 INJECTION, SOLUTION INTRAVENOUS; SUBCUTANEOUS at 12:07

## 2019-01-01 RX ADMIN — INSULIN LISPRO 2 UNITS: 100 INJECTION, SOLUTION INTRAVENOUS; SUBCUTANEOUS at 08:13

## 2019-01-01 RX ADMIN — QUETIAPINE FUMARATE 25 MG: 25 TABLET ORAL at 08:50

## 2019-01-01 RX ADMIN — SERTRALINE HYDROCHLORIDE 25 MG: 50 TABLET ORAL at 08:46

## 2019-01-01 RX ADMIN — BUSPIRONE HYDROCHLORIDE 5 MG: 10 TABLET ORAL at 16:53

## 2019-01-01 RX ADMIN — HYDROCHLOROTHIAZIDE 12.5 MG: 12.5 TABLET ORAL at 09:14

## 2019-01-01 RX ADMIN — MORPHINE SULFATE 2 MG: 2 INJECTION, SOLUTION INTRAMUSCULAR; INTRAVENOUS at 05:40

## 2019-01-01 RX ADMIN — HYDROCODONE BITARTRATE AND ACETAMINOPHEN 1 TABLET: 7.5; 325 TABLET ORAL at 12:43

## 2019-01-01 RX ADMIN — LORAZEPAM 0.25 MG: 2 INJECTION INTRAMUSCULAR; INTRAVENOUS at 13:15

## 2019-01-01 RX ADMIN — BUSPIRONE HYDROCHLORIDE 5 MG: 10 TABLET ORAL at 09:25

## 2019-01-01 RX ADMIN — INSULIN LISPRO 2 UNITS: 100 INJECTION, SOLUTION INTRAVENOUS; SUBCUTANEOUS at 08:00

## 2019-01-01 RX ADMIN — FENTANYL CITRATE 50 MCG: 50 INJECTION, SOLUTION INTRAMUSCULAR; INTRAVENOUS at 13:35

## 2019-01-01 RX ADMIN — POTASSIUM CHLORIDE 10 MEQ: 750 CAPSULE, EXTENDED RELEASE ORAL at 09:24

## 2019-01-01 RX ADMIN — DOCUSATE SODIUM 100 MG: 100 CAPSULE, LIQUID FILLED ORAL at 08:14

## 2019-01-01 RX ADMIN — CEFAZOLIN SODIUM 2 G: 2 INJECTION, SOLUTION INTRAVENOUS at 21:49

## 2019-01-01 RX ADMIN — BUSPIRONE HYDROCHLORIDE 5 MG: 10 TABLET ORAL at 08:46

## 2019-01-01 RX ADMIN — HYDROCODONE BITARTRATE AND ACETAMINOPHEN 1 TABLET: 7.5; 325 TABLET ORAL at 12:00

## 2019-01-01 RX ADMIN — SUCRALFATE 1 G: 1 TABLET ORAL at 21:02

## 2019-01-01 RX ADMIN — HYDROCODONE BITARTRATE AND ACETAMINOPHEN 1 TABLET: 7.5; 325 TABLET ORAL at 17:59

## 2019-01-01 RX ADMIN — SUCRALFATE 1 G: 1 TABLET ORAL at 12:22

## 2019-01-01 RX ADMIN — LORAZEPAM 0.25 MG: 2 INJECTION INTRAMUSCULAR; INTRAVENOUS at 16:24

## 2019-01-01 RX ADMIN — BUSPIRONE HYDROCHLORIDE 5 MG: 10 TABLET ORAL at 18:06

## 2019-01-01 RX ADMIN — ENALAPRILAT 1.25 MG: 1.25 INJECTION INTRAVENOUS at 17:20

## 2019-01-01 RX ADMIN — DONEPEZIL HYDROCHLORIDE 20 MG: 10 TABLET, FILM COATED ORAL at 23:45

## 2019-01-01 RX ADMIN — SUCRALFATE 1 G: 1 TABLET ORAL at 12:00

## 2019-01-01 RX ADMIN — HYDROCODONE BITARTRATE AND ACETAMINOPHEN 1 TABLET: 7.5; 325 TABLET ORAL at 02:14

## 2019-01-01 RX ADMIN — MORPHINE SULFATE 2 MG: 2 INJECTION, SOLUTION INTRAMUSCULAR; INTRAVENOUS at 19:45

## 2019-01-01 RX ADMIN — ASPIRIN 81 MG 81 MG: 81 TABLET ORAL at 09:14

## 2019-01-01 RX ADMIN — LISINOPRIL 10 MG: 10 TABLET ORAL at 08:45

## 2019-01-01 RX ADMIN — HYDROCODONE BITARTRATE AND ACETAMINOPHEN 1 TABLET: 7.5; 325 TABLET ORAL at 08:47

## 2019-01-01 RX ADMIN — MORPHINE SULFATE 2 MG: 2 INJECTION, SOLUTION INTRAMUSCULAR; INTRAVENOUS at 16:24

## 2019-01-01 RX ADMIN — ONDANSETRON 4 MG: 2 INJECTION INTRAMUSCULAR; INTRAVENOUS at 13:58

## 2019-01-01 RX ADMIN — SODIUM CHLORIDE, PRESERVATIVE FREE 3 ML: 5 INJECTION INTRAVENOUS at 23:09

## 2019-01-01 RX ADMIN — SUCRALFATE 1 G: 1 TABLET ORAL at 20:38

## 2019-01-01 RX ADMIN — SODIUM CHLORIDE 75 ML/HR: 9 INJECTION, SOLUTION INTRAVENOUS at 11:13

## 2019-01-01 RX ADMIN — SUCRALFATE 1 G: 1 TABLET ORAL at 08:17

## 2019-01-01 RX ADMIN — SODIUM CHLORIDE 75 ML/HR: 9 INJECTION, SOLUTION INTRAVENOUS at 01:56

## 2019-01-01 RX ADMIN — LIDOCAINE HYDROCHLORIDE 50 MG: 10 INJECTION, SOLUTION INFILTRATION; PERINEURAL at 13:19

## 2019-01-01 RX ADMIN — DOCUSATE SODIUM 100 MG: 100 CAPSULE, LIQUID FILLED ORAL at 09:14

## 2019-01-01 RX ADMIN — QUETIAPINE FUMARATE 25 MG: 25 TABLET ORAL at 20:50

## 2019-01-01 RX ADMIN — LISINOPRIL 30 MG: 20 TABLET ORAL at 08:52

## 2019-01-01 RX ADMIN — SUCRALFATE 1 G: 1 TABLET ORAL at 16:53

## 2019-01-01 RX ADMIN — LISINOPRIL 40 MG: 40 TABLET ORAL at 08:14

## 2019-01-01 RX ADMIN — ENOXAPARIN SODIUM 40 MG: 40 INJECTION SUBCUTANEOUS at 10:49

## 2019-01-01 RX ADMIN — MORPHINE SULFATE 1 MG: 2 INJECTION, SOLUTION INTRAMUSCULAR; INTRAVENOUS at 15:18

## 2019-01-01 RX ADMIN — BUSPIRONE HYDROCHLORIDE 5 MG: 10 TABLET ORAL at 16:08

## 2019-01-01 RX ADMIN — QUETIAPINE FUMARATE 25 MG: 25 TABLET ORAL at 21:43

## 2019-01-01 RX ADMIN — HYDROCODONE BITARTRATE AND ACETAMINOPHEN 1 TABLET: 7.5; 325 TABLET ORAL at 12:29

## 2019-01-01 RX ADMIN — LORAZEPAM 0.25 MG: 2 INJECTION INTRAMUSCULAR; INTRAVENOUS at 15:07

## 2019-01-01 RX ADMIN — ENOXAPARIN SODIUM 40 MG: 40 INJECTION SUBCUTANEOUS at 08:49

## 2019-01-01 RX ADMIN — SERTRALINE HYDROCHLORIDE 25 MG: 50 TABLET ORAL at 08:44

## 2019-01-01 RX ADMIN — ACETAMINOPHEN 650 MG: 325 TABLET ORAL at 23:40

## 2019-01-01 RX ADMIN — POTASSIUM CHLORIDE 10 MEQ: 750 CAPSULE, EXTENDED RELEASE ORAL at 10:49

## 2019-01-01 RX ADMIN — DOCUSATE SODIUM 100 MG: 100 CAPSULE, LIQUID FILLED ORAL at 08:45

## 2019-01-01 RX ADMIN — ATORVASTATIN CALCIUM 40 MG: 40 TABLET, FILM COATED ORAL at 19:46

## 2019-01-01 RX ADMIN — HYDROCODONE BITARTRATE AND ACETAMINOPHEN 1 TABLET: 7.5; 325 TABLET ORAL at 09:09

## 2019-01-01 RX ADMIN — BUSPIRONE HYDROCHLORIDE 5 MG: 10 TABLET ORAL at 09:14

## 2019-01-01 RX ADMIN — QUETIAPINE FUMARATE 25 MG: 25 TABLET ORAL at 23:46

## 2019-01-01 RX ADMIN — QUETIAPINE FUMARATE 25 MG: 25 TABLET ORAL at 12:40

## 2019-01-01 RX ADMIN — FENTANYL CITRATE 50 MCG: 50 INJECTION, SOLUTION INTRAMUSCULAR; INTRAVENOUS at 13:19

## 2019-01-01 RX ADMIN — SODIUM CHLORIDE, POTASSIUM CHLORIDE, SODIUM LACTATE AND CALCIUM CHLORIDE: 600; 310; 30; 20 INJECTION, SOLUTION INTRAVENOUS at 12:40

## 2019-01-01 RX ADMIN — QUETIAPINE FUMARATE 25 MG: 25 TABLET ORAL at 07:56

## 2019-01-01 RX ADMIN — SERTRALINE HYDROCHLORIDE 25 MG: 50 TABLET ORAL at 10:48

## 2019-01-01 RX ADMIN — POTASSIUM CHLORIDE 40 MEQ: 750 CAPSULE, EXTENDED RELEASE ORAL at 16:03

## 2019-01-01 RX ADMIN — SUCRALFATE 1 G: 1 TABLET ORAL at 07:54

## 2019-01-01 RX ADMIN — INSULIN LISPRO 2 UNITS: 100 INJECTION, SOLUTION INTRAVENOUS; SUBCUTANEOUS at 21:44

## 2019-01-01 RX ADMIN — MORPHINE SULFATE 2 MG: 2 INJECTION, SOLUTION INTRAMUSCULAR; INTRAVENOUS at 16:19

## 2019-01-01 RX ADMIN — QUETIAPINE FUMARATE 25 MG: 25 TABLET ORAL at 14:53

## 2019-01-01 RX ADMIN — HYDROCODONE BITARTRATE AND ACETAMINOPHEN 1 TABLET: 7.5; 325 TABLET ORAL at 18:07

## 2019-01-01 RX ADMIN — CYANOCOBALAMIN TAB 1000 MCG 1000 MCG: 1000 TAB at 09:12

## 2019-01-01 RX ADMIN — SUCRALFATE 1 G: 1 TABLET ORAL at 08:44

## 2019-01-01 RX ADMIN — ENALAPRILAT 1.25 MG: 1.25 INJECTION INTRAVENOUS at 04:16

## 2019-01-01 RX ADMIN — SERTRALINE HYDROCHLORIDE 25 MG: 50 TABLET ORAL at 09:31

## 2019-01-01 RX ADMIN — CEFAZOLIN SODIUM 2 G: 2 INJECTION, SOLUTION INTRAVENOUS at 13:19

## 2019-01-01 RX ADMIN — FLUOXETINE HYDROCHLORIDE 20 MG: 20 CAPSULE ORAL at 08:47

## 2019-01-01 RX ADMIN — INSULIN LISPRO 2 UNITS: 100 INJECTION, SOLUTION INTRAVENOUS; SUBCUTANEOUS at 16:45

## 2019-01-01 RX ADMIN — MORPHINE SULFATE 1 MG: 2 INJECTION, SOLUTION INTRAMUSCULAR; INTRAVENOUS at 04:54

## 2019-01-01 RX ADMIN — DOCUSATE SODIUM 100 MG: 100 CAPSULE, LIQUID FILLED ORAL at 09:28

## 2019-01-01 RX ADMIN — SUCRALFATE 1 G: 1 TABLET ORAL at 18:14

## 2019-01-01 RX ADMIN — SODIUM CHLORIDE 125 ML/HR: 9 INJECTION, SOLUTION INTRAVENOUS at 21:00

## 2019-01-01 RX ADMIN — INSULIN LISPRO 2 UNITS: 100 INJECTION, SOLUTION INTRAVENOUS; SUBCUTANEOUS at 17:00

## 2019-01-01 RX ADMIN — BUSPIRONE HYDROCHLORIDE 5 MG: 10 TABLET ORAL at 21:39

## 2019-01-01 RX ADMIN — SODIUM CHLORIDE 125 ML/HR: 9 INJECTION, SOLUTION INTRAVENOUS at 06:43

## 2019-01-01 RX ADMIN — SUCRALFATE 1 G: 1 TABLET ORAL at 16:40

## 2019-01-01 RX ADMIN — CYANOCOBALAMIN TAB 1000 MCG 1000 MCG: 1000 TAB at 07:55

## 2019-01-01 RX ADMIN — HYDROMORPHONE HYDROCHLORIDE 0.5 MG: 1 INJECTION, SOLUTION INTRAMUSCULAR; INTRAVENOUS; SUBCUTANEOUS at 14:35

## 2019-01-01 RX ADMIN — SUCRALFATE 1 G: 1 TABLET ORAL at 19:46

## 2019-01-01 RX ADMIN — BUSPIRONE HYDROCHLORIDE 5 MG: 10 TABLET ORAL at 21:53

## 2019-01-01 RX ADMIN — DONEPEZIL HYDROCHLORIDE 20 MG: 10 TABLET, FILM COATED ORAL at 23:06

## 2019-01-01 RX ADMIN — FLUOXETINE HYDROCHLORIDE 20 MG: 20 CAPSULE ORAL at 09:12

## 2019-01-01 RX ADMIN — MAGNESIUM HYDROXIDE 10 ML: 2400 SUSPENSION ORAL at 09:28

## 2019-01-01 RX ADMIN — LORAZEPAM 0.25 MG: 2 INJECTION INTRAMUSCULAR; INTRAVENOUS at 07:54

## 2019-01-01 RX ADMIN — DOCUSATE SODIUM 100 MG: 100 CAPSULE, LIQUID FILLED ORAL at 10:48

## 2019-01-01 RX ADMIN — SERTRALINE HYDROCHLORIDE 25 MG: 50 TABLET ORAL at 08:50

## 2019-01-01 RX ADMIN — HYDROCODONE BITARTRATE AND ACETAMINOPHEN 1 TABLET: 7.5; 325 TABLET ORAL at 02:33

## 2019-01-01 RX ADMIN — SUCRALFATE 1 G: 1 TABLET ORAL at 21:40

## 2019-01-01 RX ADMIN — SERTRALINE HYDROCHLORIDE 25 MG: 50 TABLET ORAL at 09:13

## 2019-01-01 RX ADMIN — SUCRALFATE 1 G: 1 TABLET ORAL at 12:11

## 2019-01-01 RX ADMIN — QUETIAPINE FUMARATE 25 MG: 25 TABLET ORAL at 08:27

## 2019-01-01 RX ADMIN — SUCRALFATE 1 G: 1 TABLET ORAL at 21:43

## 2019-01-01 RX ADMIN — BUSPIRONE HYDROCHLORIDE 5 MG: 10 TABLET ORAL at 20:50

## 2019-01-01 RX ADMIN — DONEPEZIL HYDROCHLORIDE 20 MG: 10 TABLET, FILM COATED ORAL at 20:50

## 2019-01-01 RX ADMIN — FLUOXETINE HYDROCHLORIDE 20 MG: 20 CAPSULE ORAL at 07:55

## 2019-01-01 RX ADMIN — HYDROCODONE BITARTRATE AND ACETAMINOPHEN 1 TABLET: 7.5; 325 TABLET ORAL at 05:40

## 2019-01-01 RX ADMIN — HYDROCODONE BITARTRATE AND ACETAMINOPHEN 1 TABLET: 7.5; 325 TABLET ORAL at 08:44

## 2019-01-01 RX ADMIN — DONEPEZIL HYDROCHLORIDE 20 MG: 10 TABLET, FILM COATED ORAL at 20:37

## 2019-01-01 RX ADMIN — BUSPIRONE HYDROCHLORIDE 5 MG: 10 TABLET ORAL at 16:19

## 2019-01-01 RX ADMIN — ENOXAPARIN SODIUM 40 MG: 40 INJECTION SUBCUTANEOUS at 10:10

## 2019-01-01 RX ADMIN — SERTRALINE HYDROCHLORIDE 25 MG: 50 TABLET ORAL at 08:15

## 2019-01-01 RX ADMIN — QUETIAPINE FUMARATE 12.5 MG: 25 TABLET ORAL at 18:54

## 2019-01-01 RX ADMIN — SUCRALFATE 1 G: 1 TABLET ORAL at 12:25

## 2019-01-05 PROBLEM — K80.20 GALLSTONES: Status: ACTIVE | Noted: 2019-01-01

## 2019-01-05 PROBLEM — S72.92XA CLOSED FRACTURE OF LEFT FEMUR (HCC): Status: ACTIVE | Noted: 2019-01-01

## 2019-01-06 PROBLEM — I50.32 CHRONIC DIASTOLIC CHF (CONGESTIVE HEART FAILURE) (HCC): Status: ACTIVE | Noted: 2019-01-01

## 2019-01-06 NOTE — ANESTHESIA PREPROCEDURE EVALUATION
Anesthesia Evaluation     Patient summary reviewed and Nursing notes reviewed   no history of anesthetic complications:  NPO Solid Status: > 8 hours  NPO Liquid Status: > 8 hours           Airway   Mallampati: II  TM distance: >3 FB  Neck ROM: full  No difficulty expected  Dental - normal exam     Pulmonary - negative pulmonary ROS and normal exam    breath sounds clear to auscultation  Cardiovascular - normal exam    ECG reviewed  Rhythm: regular  Rate: normal    (+) hypertension,     ROS comment: Echo 11/18 - EF 60%    Neuro/Psych  (+) CVA, dementia,     GI/Hepatic/Renal/Endo    (+)   diabetes mellitus type 2,     Musculoskeletal         ROS comment: Left hip fx  Abdominal    Substance History      OB/GYN          Other - negative ROS                       Anesthesia Plan    ASA 3 - emergent     general   (Pt DNR, speaking with son he wishes to have DNR upheld during surgery.)  intravenous induction   Anesthetic plan, all risks, benefits, and alternatives have been provided, discussed and informed consent has been obtained with: patient.

## 2019-01-06 NOTE — ANESTHESIA POSTPROCEDURE EVALUATION
Patient: Val Hill    Procedure Summary     Date:  01/06/19 Room / [facility]:   [facility]    Anesthesia Start:  1316 Anesthesia Stop:  1419    Procedure:  HIP TROCANTERIC NAILING WITH INTRAMEDULLARY HIP SCREW (Left Hip) Diagnosis:      Surgeon:  Hola Yanez Jr., MD Provider:  Michel Wen MD    Anesthesia Type:  general ASA Status:  3 - Emergent          Anesthesia Type: general  Last vitals  BP   143/67 (01/06/19 1418)   Temp   97.9 °F (36.6 °C) (01/06/19 1418)   Pulse   73 (01/06/19 1418)   Resp   20 (01/06/19 1149)     SpO2   98 % (01/06/19 1418)     Post Anesthesia Care and Evaluation    Patient location during evaluation: PACU  Patient participation: complete - patient participated  Level of consciousness: awake and alert  Pain score: 0  Pain management: adequate  Airway patency: patent  Anesthetic complications: No anesthetic complications  PONV Status: none  Cardiovascular status: hemodynamically stable and acceptable  Respiratory status: nonlabored ventilation, acceptable and nasal cannula  Hydration status: acceptable

## 2019-01-06 NOTE — H&P
[facility] Medicine Services  HISTORY AND PHYSICAL    Patient Name: Val Hill  : 1930  MRN: [id number]  Primary Care Physician: Provider, No Known  Date of admission: 2019      Subjective   Subjective     Chief Complaint:  Left hip pain    HPI:  Val Hill is a 88 y.o. female with a history of HLD, DM, HTN, CVA, dementia, was transferred from [facility] tonight after having an unwitnessed fall.  History is limited secondary to the patient's baseline dementia, and she does not recall falling tonight.  Patient complains of some left hip pain but denies back or neck pain, headache, shortness of air, chest pain, or any other complaints at this time.  At baseline patient is nonambulatory secondary to previous CVA.  Xray shows a fractured upper aspect of the left femur.  CT of the head, cervical spine, abdomen and pelvis unremarkable.  The ED physician discussed to case with Dr. Yanez who is on call for orthopedics and he plans to see the patient in the am.  Patient is being admitted to the Hospitalist for further evaluation and management.       Review of Systems   Musculoskeletal:        Left hip pain   All other systems reviewed and are negative.         Otherwise 10-system ROS reviewed and is negative except as mentioned in the HPI.    Personal History     Past Medical History:   Diagnosis Date   • Dementia    • Diabetes mellitus (CMS/HCC)    • Hyperlipidemia    • Hypertension    • Stroke (CMS/HCC)        Past Surgical History:   Procedure Laterality Date   • ELBOW PROCEDURE     • HYSTERECTOMY         Family History: family history includes Cancer in her father. Otherwise pertinent FHx was reviewed and unremarkable.     Social History:  reports that she has quit smoking. she has never used smokeless tobacco. She reports that she does not drink alcohol or use drugs.  Social History     Social History Narrative   • Not on file        Medications:    Available home medication information reviewed.    (Not in a hospital admission)    No Known Allergies    Objective   Objective     Vital Signs:   Temp:  [97.9 °F (36.6 °C)] 97.9 °F (36.6 °C)  Heart Rate:  [72-84] 72  Resp:  [20] 20  BP: (169)/(89) 169/89        Physical Exam   Constitutional: She appears well-developed. No distress.   HENT:   Head: Normocephalic.   Eyes: Pupils are equal, round, and reactive to light.   Neck: Normal range of motion. Neck supple.   Cardiovascular: Normal rate, regular rhythm, normal heart sounds and intact distal pulses. Exam reveals no gallop and no friction rub.   No murmur heard.  Pulmonary/Chest: Effort normal and breath sounds normal. No stridor. No respiratory distress. She has no wheezes. She has no rales.   Abdominal: Soft. Bowel sounds are normal. She exhibits no distension and no mass. There is no tenderness. There is no rebound and no guarding.   Musculoskeletal: She exhibits no edema or tenderness.   LLE externally rotated and shortened.   Neurological: She is alert.   Oriented to person and place, follows simple commands, no sensory deficit   Skin: Skin is warm and dry. No rash noted. She is not diaphoretic. No erythema. No pallor.   Psychiatric: She has a normal mood and affect. Her behavior is normal.          Results Reviewed:  I have personally reviewed current lab, radiology, and data and agree.    Results from last 7 days   Lab Units  01/05/19 2018   WBC 10*3/mm3  14.11*   HEMOGLOBIN g/dL  12.2   HEMATOCRIT %  37.8   PLATELETS 10*3/mm3  330     Results from last 7 days   Lab Units  01/05/19 2023 01/05/19 2018   SODIUM mmol/L   --   137   POTASSIUM mmol/L   --   3.9   CHLORIDE mmol/L   --   102   CO2 mmol/L   --   29.0   BUN mg/dL   --   10   CREATININE mg/dL   --   0.66   GLUCOSE mg/dL   --   186*   CALCIUM mg/dL   --   9.5   ALT (SGPT) U/L   --   31   AST (SGOT) U/L   --   23   TROPONIN I ng/mL  0.00   --      Estimated Creatinine  Clearance: 35.1 mL/min (by C-G formula based on SCr of 0.66 mg/dL).  Brief Urine Lab Results  (Last result in the past 365 days)      Color   Clarity   Blood   Leuk Est   Nitrite   Protein   CREAT   Urine HCG        01/05/19 2238 Yellow Clear Negative Negative Negative Negative             BNP   Date Value Ref Range Status   01/05/2019 24.0 0.0 - 100.0 pg/mL Final     Comment:     Results may be falsely decreased if patient taking Biotin.     Imaging Results (last 24 hours)     Procedure Component Value Units Date/Time    XR Hip With or Without Pelvis 2 - 3 View Left [[id number]] Updated:  01/05/19 2242    CT Abdomen Pelvis Without Contrast [[id number]] Collected:  01/05/19 2029     Updated:  01/05/19 2140    Narrative:       EXAM:    CT Abdomen and Pelvis Without Contrast     EXAM DATE/TIME:    1/5/2019 8:29 PM     CLINICAL HISTORY:    88 years old, female; Injury or trauma; Fall; Initial encounter;   Concussion/head injury; Additional info: Demented elder with HX of unwitnessed   fall     TECHNIQUE:    Axial computed tomography images of the abdomen and pelvis without contrast.    All CT scans at this facility use at least one of these dose optimization   techniques: automated exposure control; mA and/or kV adjustment per patient   size (includes targeted exams where dose is matched to clinical indication); or   iterative reconstruction.    Coronal and sagittal reformatted images were created and reviewed.     COMPARISON:    No comparison abdominal and pelvic imaging studies are available.    FINDINGS:   Please note: Sensitivity for the detection of traumatic injury is suboptimal   given the absence of contrast enhancement. Some overlying artifact from the   patient's  arm positioning. Otherwise:    Liver: Unremarkable.     Gallbladder: A few small calcified gallstones without definite evidence of   acute cholecystitis.     Spleen: Unremarkable.     Pancreas: Unremarkable.     Adrenal glands: Unremarkable right  adrenal gland. Relatively prominent,   perhaps hyperplastic left adrenal gland.    Kidneys and urinary bladder: No urinary tract stones. No hydronephrosis or   acute kidney inflammation bilaterally. Grossly unremarkable urinary bladder.     Uterus and adnexa: No well-defined adnexal mass is seen bilaterally. Status   post hysterectomy.     Gastrointestinal tract: The stomach, small bowel, and colon are variably   opacified or decompressed but otherwise no definite acute obstruction or   inflammation is seen.  Relative prominence of the walls of some segments of the   stomach, small bowel, and colon may reflect lack of distention rather than   inflammation. At least sigmoid colon diverticula without acute diverticulitis.   No acute appendicitis.    Fluid: No free fluid, or acute hematoma or abscess.     Gas: No free air.     Lymph nodes: No definite pathologic lymph node enlargement is seen.     Aorta: Nonaneurysmal abdominal aorta. No periaortic fluid. Aortic and branch   arterial calcified plaques.      Abdominal wall: There appears to be a degree of pelvic floor laxity.     Lung bases: No definite acute traumatic lung injury bilaterally. No pleural   effusions.     Skeletal structures: Somewhat displaced, impacted, and comminuted fracture of   the upper aspect of the left femur, largely involving the intertrochanteric and   to some extent the basicervical regions. Normally located left femoral head. No   acute fracture or subluxation is seen elsewhere. Spinal degenerative changes.   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 A degree of bone   demineralization is also noted.       Impression:       1. Fractured upper aspect of the left femur.  2. No acute traumatic injury is seen elsewhere in the abdomen or pelvis.   3. A few small gallstones without acute cholecystitis.  4. Colonic diverticula without acute diverticulitis.    5. Other findings as above.     THIS DOCUMENT HAS BEEN ELECTRONICALLY SIGNED BY SMILEY LOZADA MD    CT Chest Without Contrast [[id number]] Collected:  01/05/19 2029     Updated:  01/05/19 2127    Addenda:        Addition to the TECHNIQUE section: Sagittal reformatted images were also   created and reviewed.     THIS DOCUMENT HAS BEEN ELECTRONICALLY SIGNED BY SMILEY LOZADA MD  Signed:  01/05/19 2127 by Smiley Lozada MD    Narrative:       EXAM:    CT Chest Without Contrast     EXAM DATE/TIME:    1/5/2019 8:29 PM     CLINICAL HISTORY:    88 years old, female; Injury or trauma; Fall; Initial encounter; Concussion   /head injury; Additional info: Demented elder with HX of unwitnessed fall     TECHNIQUE:    Axial computed tomography images of the chest without intravenous contrast.    All CT scans at this facility use at least one of these dose optimization   techniques: automated exposure control; mA and/or kV adjustment per patient   size (includes targeted exams where dose is matched to clinical indication); or   iterative reconstruction.    Coronal reformatted images were created and reviewed.     COMPARISON:    CR XR CHEST 1 VW 12/26/2018 6:58 PM; no comparison CT scan of the chest is   available.    FINDINGS:     Please note: Detection of traumatic injury is limited by the absence of   intravenous contrast enhancement. Some overlying artifact from the patient's    arm positioning. Otherwise:    No mediastinal hematoma or pneumomediastinum. No pericardial effusion or   pneumopericardium. Poorly defined coronary artery calcified plaques.   Nonaneurysmal thoracic aorta.     No traumatic lung injury is seen in either lung. No dense consolidations are   seen. Otherwise noted are some tiny cystic-appearing spaces largely   peripherally with scarring and somewhat heterogeneous lung parenchyma that may   reflect groundglass pneumonitis and/or air trapping. It is not certain that an   interstitial  pneumonia is present.    No definite pathologic lymph node enlargement.     No pleural effusion bilaterally.     No pneumothorax bilaterally.     No acute fracture or dislocation is seen. Chronic fracture of the posterior   aspects of the left 9th and 10th ribs. Spinal degenerative changes.    No definite acute traumatic injury is seen in the visualized upper abdomen.       Impression:       1. No acute traumatic injury is seen in the chest.  2. No dense consolidations in either lung. Some groundglass pneumonitis and/or   air trapping might otherwise be present.  Other findings as above.     THIS DOCUMENT HAS BEEN ELECTRONICALLY SIGNED BY SMILEY LOZADA MD    CT Cervical Spine Without Contrast [[id number]] Collected:  01/05/19 2029     Updated:  01/05/19 2113    Narrative:       EXAM:    CT Cervical Spine Without Contrast     EXAM DATE/TIME:    1/5/2019 8:29 PM     CLINICAL HISTORY:    88 years old, female; Injury or trauma; Fall; Initial encounter; Concussion   /head injury; Additional info: Demented elder with HX of unwitnessed fall     TECHNIQUE:    Axial computed tomography images of the cervical spine without intravenous   contrast.    All CT scans at this facility use at least one of these dose optimization   techniques: automated exposure control; mA and/or kV adjustment per patient   size (includes targeted exams where dose is matched to clinical indication); or   iterative reconstruction.    Coronal and sagittal reformatted images were created and reviewed.     COMPARISON:    CT CERVICAL SPINE WO CONTRAST 12/29/2018 11:28 AM     FINDINGS:     No acute fracture or subluxation is seen. Degenerative disc disease, and   facet and uncovertebral arthropathy at multiple levels with neural foraminal   and spinal canal narrowing particularly at C5-C6, although the intervertebral   discs and other soft tissues are overall not optimally characterized. No   suspicious focal blastic or lytic bone lesion is seen. A  degree of bone   demineralization is noted. Small accessory C7 ribs.    No gross paraspinous soft tissue injury is seen.       Impression:       1. No acute fracture or subluxation of the cervical spine.   2. Other findings as above.       THIS DOCUMENT HAS BEEN ELECTRONICALLY SIGNED BY SMILEY LOZADA MD    CT Head Without Contrast [[id number]] Collected:  01/05/19 2029     Updated:  01/05/19 2106    Narrative:       EXAM:    CT Head Without Contrast     EXAM DATE/TIME:    1/5/2019 8:29 PM     CLINICAL HISTORY:    88 years old, female; Injury or trauma; Fall; Initial encounter; Concussion /   head injury; Additional info: Demented elder with HX of unwitnessed fall     TECHNIQUE:    Axial computed tomography images of the head/brain without contrast.    All CT scans at this facility use at least one of these dose optimization   techniques: automated exposure control; mA and/or kV adjustment per patient   size (includes targeted exams where dose is matched to clinical indication); or   iterative reconstruction.     COMPARISON:    CT HEAD WO CONTRAST 12/29/2018 11:28 AM     FINDINGS:     No intracranial hematoma, definite acute infarct, mass or mass-effect,   hydrocephalus, or skull fracture is seen. Please note that CT can underestimate   ischemia up to at least 24 hours after the event. Otherwise, chronic lacunar   infarct toward the left basal ganglia and internal capsule. Probable   microvascular ischemic white matter changes elsewhere. The gray-white matter   interface is preserved. Atrophic changes.     The visualized paranasal sinuses and mastoid air cells are overall   well-aerated.       Impression:       1. No acute intracranial injury or skull fracture.   2. Other findings as above.     THIS DOCUMENT HAS BEEN ELECTRONICALLY SIGNED BY SMILEY LOZADA MD    XR Femur 2 View Left [[id number]] Collected:  01/05/19 1920     Updated:  01/05/19 2101    Narrative:       EXAM:    XR Left Femur, 2 Views     EXAM  DATE/TIME:    1/5/2019 7:20 PM     CLINICAL HISTORY:    88 years old, female; Pain; Other: Fall from standing left leg pain     TECHNIQUE:    XR Left femur, AP and lateral views     COMPARISON:    No comparison left femur imaging studies are available.     FINDINGS:   Some overlying artifact. Otherwise:    Impacted fracture toward the intertrochanteric and basicervical and region of   the left femur with a degree of medial angulation of the proximal major   fragment. The femoral head is otherwise normally located.  No left femur   fracture or dislocation is seen elsewhere. No suspicious focal blastic or lytic   bone lesion.     No radiopaque foreign body.       Impression:       1. Fractured upper aspect of the left femur.  2. Other findings as above.     THIS DOCUMENT HAS BEEN ELECTRONICALLY SIGNED BY SMILEY LOZADA MD        Results for orders placed during the hospital encounter of 11/16/18   Adult Transthoracic Echo Complete W/ Cont if Necessary Per Protocol (With Agitated Saline)    Narrative · Left ventricular systolic function is normal. Estimated EF = 60%.  · Left ventricular diastolic dysfunction (grade I) consistent with   impaired relaxation.  · Mild mitral valve regurgitation is present  · Mild tricuspid valve regurgitation is present.  · Calculated right ventricular systolic pressure from tricuspid   regurgitation is 32 mmHg.  · Negative saline bubble study.          Assessment/Plan   Assessment / Plan     Active Hospital Problems    Diagnosis Date Noted   • **Closed fracture of left femur (CMS/HCC) [S72.92XA] 01/05/2019   • Gallstones [K80.20] 01/05/2019   • Dementia [F03.90] 11/17/2018   • Leukocytosis [D72.829] 11/17/2018   • Type 2 diabetes mellitus (CMS/HCC) [E11.9] 11/17/2018         ASSESSMENT AND PLAN:    1.  Closed fracture of the left femur   -consult Dr. Yanez in the am   -npo after midnight   -pain control   -neurovascular checks   -incentive spirometry   -Type and screen   -hold aspirin  for surgery   -cbc, bmp in the am    2.  Dementia   -fall precautions   -continue aricept and seroquel    3.  T2DM   -a1c in the am   -fsbg with ssi   -hold metformin    4.  Gallstones   -no cholecystitis    5.  Leukocytosis   -cbc in the am      DVT prophylaxis:  Mechanical    CODE STATUS:    Code Status and Medical Interventions:   Ordered at: 01/05/19 7664     Limited Support to NOT Include:    Dialysis    Intubation    Artificial Nutrition     Level Of Support Discussed With:    Next of Kin (If No Surrogate)     Code Status:    No CPR     Medical Interventions (Level of Support Prior to Arrest):    Limited       Admission Status:  I believe this patient meets INPATIENT status due to the need for care which can only be reasonably provided in an hospital setting such as possible need for aggressive/expedited ancillary services and/or consultation services, IV medications, close physician monitoring, and/or procedures.  In such, I feel patient’s risk for adverse outcomes and need for care warrant INPATIENT evaluation and predict the patient’s care encounter to likely last beyond 2 midnights.       Electronically signed by JOLYNN Lewis, 01/05/19, 10:12 PM.    PHYSICIAN NOTE(ADDENDUM)       Vitals:    01/05/19 2300   BP: 170/90   Pulse: 67   Resp: 16   Temp: 97.6 °F (36.4 °C)   SpO2: 95%       Exam  RS- CTA-BL, ,  No wheezing , no crackles, good effort.  CVS- s1s2 regular, no murmur.  ABD- soft, non tender, not distended, no organomegaly.  EXT- no edema.L.ext ext rotated, shortenend.  NEURO- Awake, but not oriented, currently calm, no focal defecit.      Old records reviewed and summarized in PM hx.    HPI.  88 Y F, to ed, with unwitnessed fall-x-ray-L.femur #.  This is 3rd fall , in past few weeks as per family.  No co of syncope or lightheadedness.  Overall poor functional status, currently not walking-transfers in chair.    Pm hx  Recent L.basal ganglia CVA-on asa  HLP  Dementia  DM2-on  metformin.    EKG-sr 65, no acute changes.  Echo- ED-60 %, dd, on 11/18.    A/P.  Dr. Yanez to see in am  -pt did get oversedated with 1 mg of dilaudid.  -pt is DNR.  -no known heart dis, no active co of chest pain, or pedal edema, overall poor functional capacity with age.  -monitor for falls, delerium sx.  DVT pxl-per ortho.          See above for further detailed assessment and plan developed with APC which I have reviewed and/or edited.     I have discussed with findings, diagnosis and plans with the patient and family.     Vickie Colbert MD 01/05/19 11:44 PM

## 2019-01-06 NOTE — CONSULTS
Orthopedic Consult      Patient: Val Hill    Date of Admission: 1/5/2019  6:32 PM    YOB: 1930    Medical Record Number: [id number]    Attending Physician: Stefan Chirinos MD    Consulting Physician: Hola Yanez Jr., MD      Chief Complaints: Closed fracture of left femur, unspecified fracture morphology, unspecified portion of femur, initial encounter (CMS/[facility]) [S72.92XA]      History of Present Illness: 88 y.o. female admitted to [facility] with Closed fracture of left femur, unspecified fracture morphology, unspecified portion of femur, initial encounter (CMS/[facility]) [S72.92XA]. I was consulted for further evaluation and treatment of left hip pain. Onset of symptoms was abrupt starting 1 day ago.  Symptoms are associated with left hip and groin pain.  Symptoms are aggravated by movement of the LLE.   Symptoms improve with pain medication. This is an 87 yo F with 1 day h/o L hip pain after GLF from standing height. She presented to [facility] ED and was found to have a L IT/basicervical femur fracture. At baseline, she ambulates minimally and mainly uses a wheelchair for getting around. She does have significant dementia and her POA is her son Tapan Hill.      No Known Allergies     Home Medications:  Medications Prior to Admission   Medication Sig Dispense Refill Last Dose   • aspirin 81 MG tablet Take 81 mg by mouth Daily.   1/5/2019 at 0800   • atorvastatin (LIPITOR) 40 MG tablet Take 1 tablet by mouth Every Night. 30 tablet 0 1/5/2019 at 0800   • busPIRone (BUSPAR) 5 MG tablet Take 5 mg by mouth 3 (Three) Times a Day.   1/5/2019 at 1400   • cholecalciferol (VITAMIN D3) 1000 units tablet Take 1,000 Units by mouth Daily.   1/5/2019 at 0800   • docusate sodium (COLACE) 100 MG capsule Take 100 mg by mouth Daily.   1/5/2019 at 0800   • lisinopril (PRINIVIL,ZESTRIL) 10 MG tablet Take 1 tablet by mouth Daily. 30 tablet 0 1/5/2019 at 0800   • loperamide (IMODIUM) 2 MG capsule Take 2 mg  by mouth 2 (Two) Times a Day As Needed for Diarrhea.   1/1/2019 at 0500   • METFORMIN HCL PO Take 500 mg by mouth 2 (Two) Times a Day.   1/4/2019 at 2000   • mirtazapine (REMERON) 15 MG tablet Take 15 mg by mouth Every Night.   1/4/2019 at 2100   • Multiple Vitamins-Minerals (MULTIVITAMIN ADULT PO) Take  by mouth.   1/5/2019 at 0800   • potassium chloride (K-DUR) 10 MEQ CR tablet Take 1 tablet by mouth Daily. (Patient taking differently: Take 10 mEq by mouth 2 (Two) Times a Day.) 7 tablet 0 1/5/2019 at 0800   • sertraline (ZOLOFT) 25 MG tablet Take 25 mg by mouth Daily.   1/5/2019 at 0800   • sucralfate (CARAFATE) 1 G tablet Take 1 tablet by mouth 4 (Four) Times a Day Before Meals & at Bedtime. 60 tablet 0 1/5/2019 at 1600   • vitamin B-12 (CYANOCOBALAMIN) 1000 MCG tablet Take 1,000 mcg by mouth Daily.   1/5/2019 at 0800   • acetaminophen (TYLENOL) 325 MG tablet Take 2 tablets by mouth Every 4 (Four) Hours As Needed for Mild Pain  or Fever (Temperature greater than or equal to 37 C). 1 bottle 0 Unknown at Unknown time   • Menthol-Zinc Oxide (CALMOSEPTINE EX) Apply 1 application topically As Needed. Applied to buttocks   Unknown at Unknown time   • ondansetron (ZOFRAN) 4 MG tablet Take 4 mg by mouth Every 8 (Eight) Hours As Needed for Nausea or Vomiting.   Unknown at Unknown time   • QUEtiapine (SEROquel) 25 MG tablet Take 1 tablet by mouth Every Night. (Patient taking differently: Take 25 mg by mouth Daily.) 30 tablet 0 Taking         Past Medical History:   Diagnosis Date   • Dementia    • Diabetes mellitus (CMS/HCC)    • Hyperlipidemia    • Hypertension    • Stroke (CMS/HCC)         Past Surgical History:   Procedure Laterality Date   • ELBOW PROCEDURE     • HYSTERECTOMY          Social History     Occupational History   • Not on file   Tobacco Use   • Smoking status: Former Smoker   • Smokeless tobacco: Never Used   Substance and Sexual Activity   • Alcohol use: No   • Drug use: No   • Sexual activity: Defer       Social History     Social History Narrative   • Not on file        Family History   Problem Relation Age of Onset   • Cancer Father          Review of Systems:   UTO secondary to baseline mental status    Physical Exam: 88 y.o. female  General Appearance:     cooperative, in no acute distress                   Vitals:    01/05/19 2330 01/06/19 0511 01/06/19 0653 01/06/19 1043   BP: 163/76 156/71 118/65 125/73   BP Location:  Right arm Right arm    Patient Position:  Lying Lying    Pulse: 68 67 61 83   Resp:  17 16    Temp:  98 °F (36.7 °C) 94.5 °F (34.7 °C)    TempSrc:  Oral Axillary    SpO2:  99% 100%    Weight:       Height:            Head:    Normocephalic, without obvious abnormality, atraumatic      Right Upper Extremity:  No obvious deformity, painless ROM shoulder, elbow, wrist, no joint instability, normal distal strength and sensation to light touch, skin intact without cyanosis, clubbing, edema; +2 radial pulse  Left Upper Extremity:  No obvious deformity, painless ROM shoulder, elbow, wrist, no joint instability, normal distal strength and sensation to light touch, skin intact without cyanosis, clubbing, edema; +2 radial pulse  Right Lower Extremity:  No obvious deformity, painless ROM hip, knee, ankle, compartments soft, normal distal strength and sensation to light touch, skin intact without cyanosis, clubbing, edema; +2 dorsalis pedis pulse  Left Lower Extremity:  No obvious deformity, painless ROM hip, knee, ankle, compartments soft, normal distal strength and sensation to light touch, skin intact without cyanosis, clubbing, edema; +2 dorsalis pedis pulse         Diagnostic Tests:    I have reviewed the labs, radiology results and diagnostic studies:AP/lateral of L hip demonstrates a displaced basicervical femoral neck fracture    Results from last 7 days   Lab Units  01/06/19   0259   WBC 10*3/mm3  13.59*   HEMOGLOBIN g/dL  10.9*   PLATELETS 10*3/mm3  281     Results from last 7 days   Lab Units   01/06/19   0259   SODIUM mmol/L  138   POTASSIUM mmol/L  3.5   CO2 mmol/L  27.0   CREATININE mg/dL  0.59*   GLUCOSE mg/dL  182*           Assessment: L basicervical hip fracture, displaced  Patient Active Problem List   Diagnosis   • Stroke (CMS/[facility])   • UTI (urinary tract infection)   • Metabolic encephalopathy   • Type 2 diabetes mellitus (CMS/[facility])   • Dementia   • Closed fracture of left femur (CMS/[facility])   • Gallstones   • Chronic diastolic CHF (congestive heart failure) (CMS/[facility])           Plan:  Given the patient's mental status, I did discuss this case with her son in depth. Patient's son understands the risks and benefits of operative versus non operative intervention. Benefits of non op management do include avoiding anesthesia, risks of worsening dementia and general risks of operative intervention. Risks of non operative intervention are risk of issues such as PNA, decubitus ulcer, urinary tract infections. We also discussed the benefits of oeprative intervention to include pain control, higher union rate, and ability to mobilize to a greater degree. We also discussed the risks of operative intervention. The risks and benefits were discussed with the patient to include, but not limited to: bleeding, infection, nerve injury, failure of fixation, need for further procedures, loss of limb or life. These risks are obviously greater in her situation. At this point, we will proceed with operative intervention with TFN L femur fracture.    NPO.    Posted/consented/marked.  To OR today.  CLEM Yanez Jr., MD  01/06/19  10:57 AM

## 2019-01-06 NOTE — PROGRESS NOTES
"                  Clinical Nutrition     Nutrition Assessment  Reason for Visit:   BMI      Patient Name: Val Hill  YOB: 1930  MRN: [id number]  Date of Encounter: 01/06/19 2:20 PM  Admission date: 1/5/2019      Nutrition Assessment   Assessment       Hospital Problem List    Closed fracture of left femur (CMS/HCC)    Type 2 diabetes mellitus (CMS/HCC)    Dementia    Gallstones    Chronic diastolic CHF (congestive heart failure) (CMS/HCC)    Applicable PMH:  -HLD  -HTN  -Hx of CVA (11/2018)    Reported/Observed/Food/Nutrition Related History:   Pt in OR at time of RD visit. Will follow up at a later time to further assess.     Anthropometrics     Height: 170.2 cm (67\")  Last filed wt: Weight: 45.7 kg (100 lb 12 oz) (01/05/19 1830)       BMI: BMI (Calculated): 15.8  Underweight:<18.5kg/m2    Ideal Body Weight (IBW) (kg): 61.86  Admission wt: 100 lb 12 oz  Method obtained: unknown method (1/5)      Labs reviewed   Yes      Medications reviewed   Pertinent: Lipitor, colace, prozac, insulin, KCl, VIT B12, IVF    Current Nutrition Prescription     PO: NPO Diet  No active supplement orders      Intake: Insufficient Data    Nutrition Diagnosis     1/6  Problem Underweight   Etiology dementia   Signs/Symptoms BMI=15.78     Nutrition Intervention   1.  Follow treatment progress, Care plan reviewed    Goal:   General: Nutrition support treatment  PO: Advance diet as medically appropriate    Monitoring/Evaluation:   Per protocol, PO intake, Pertinent labs, Weight, Symptoms, POC/GOC, Swallow function      Will Continue to follow per protocol      Jeniffer Fuchs RD  Time Spent: 30 minutes      "

## 2019-01-06 NOTE — ANESTHESIA PROCEDURE NOTES
ANESTHESIA INTUBATION  Urgency: elective    Airway not difficult    General Information and Staff    Patient location during procedure: OR    Indications and Patient Condition  Indications for airway management: airway protection    Preoxygenated: yes  Mask difficulty assessment: 1 - vent by mask    Final Airway Details  Final airway type: supraglottic airway      Successful airway: I-gel  Size 4    Number of attempts at approach: 1    Additional Comments  LMA placed without difficulty, ventilation with assist, equal breath sounds and symmetric chest rise and fall

## 2019-01-06 NOTE — ED PROVIDER NOTES
Subjective   Val Hill is an 88 y.o. female with a hx of HLD, DM, HTN, CVA, and dementia who presents to the ED s/p fall. Per the family, the patient had a fall in her room at [facility] tonight as reported by the nursing home staff. The fall was unwitnessed and the history is limited secondary to her baseline dementia. EMS was called and she was then brought to the ED. She indicates some pain to her right hip, but denies neck pain, back pain, headache, or any other acute complaints at this time. At baseline the patient is not ambulatory secondary to a prior CVA. Family notes that this is the patient's 4th ED visit in 2 weeks. No hx of hip surgeries or fractures. No hx of cardiac related problems.        History provided by:  Patient and relative  History limited by:  Dementia  Fall   Mechanism of injury: fall    Incident location:  Nursing home  Arrived directly from scene: yes    Fall:     Fall occurred:  Unable to specify    Impact surface:  Unable to specify  Current pain details:     Pain quality:  Unable to specify    Pain timing:  Constant  Associated symptoms: no back pain and no neck pain        Review of Systems   Constitutional: Negative for chills and fever.   Musculoskeletal: Negative for back pain and neck pain.        Right hip pain   All other systems reviewed and are negative.      Past Medical History:   Diagnosis Date   • Dementia    • Diabetes mellitus (CMS/HCC)    • Hyperlipidemia    • Hypertension    • Stroke (CMS/HCC)        No Known Allergies    Past Surgical History:   Procedure Laterality Date   • ELBOW PROCEDURE     • HYSTERECTOMY         Family History   Problem Relation Age of Onset   • Cancer Father        Social History     Socioeconomic History   • Marital status:      Spouse name: Not on file   • Number of children: Not on file   • Years of education: Not on file   • Highest education level: Not on file   Tobacco Use   • Smoking status: Former Smoker    • Smokeless tobacco: Never Used   Substance and Sexual Activity   • Alcohol use: No   • Drug use: No   • Sexual activity: Defer         Objective   Physical Exam   Constitutional: She appears well-developed and well-nourished. No distress.   HENT:   Head: Normocephalic and atraumatic.   Nose: Nose normal.   Eyes: No scleral icterus.   Pinpoint pupils after administration of opiates   Neck: Normal range of motion. Neck supple.   Neck is nontender   Cardiovascular: Normal rate, regular rhythm, normal heart sounds and intact distal pulses.   No murmur heard.  Pulmonary/Chest: Effort normal. No respiratory distress. She has decreased breath sounds. She exhibits no tenderness.   Abdominal: Soft. Bowel sounds are normal. There is no tenderness.   Musculoskeletal: Normal range of motion. She exhibits no edema.   She is holding her hip in a flexed position. It is initially rotated. No gross deformity externally. Right leg is atraumatic. Passive ROM to all joints elicit no pain   Neurological: She is alert.   Skin: Skin is warm and dry.   Psychiatric: She has a normal mood and affect. Her behavior is normal.   Nursing note and vitals reviewed.      Procedures         ED Course  ED Course as of Jan 05 2212   Sat Jan 05, 2019 2131 Paged the hospitalist  [DT]   2131 Paged the on call orthopedic surgeon  [DT]   2134 Discussed the case with Dr. Yanez, orthopedic surgeon, who will see the patient in the morning  [DT]   2141 I have discussed the plan of care for admission with consultation with orthopedics, Dr. Yanez and the family understands and concurs.  Consultation with hospitalist is pending   [ML]   2156 Discussed the case with Dr. Colbert, hospitalist, who agrees to admit the patient.   [DT]      ED Course User Index  [DT] Guy Pollard  [ML] Susan Cotter APRN     Recent Results (from the past 24 hour(s))   Comprehensive Metabolic Panel    Collection Time: 01/05/19  8:18 PM   Result Value Ref Range    Glucose  186 (H) 70 - 100 mg/dL    BUN 10 9 - 23 mg/dL    Creatinine 0.66 0.60 - 1.30 mg/dL    Sodium 137 132 - 146 mmol/L    Potassium 3.9 3.5 - 5.5 mmol/L    Chloride 102 99 - 109 mmol/L    CO2 29.0 20.0 - 31.0 mmol/L    Calcium 9.5 8.7 - 10.4 mg/dL    Total Protein 5.7 5.7 - 8.2 g/dL    Albumin 3.49 3.20 - 4.80 g/dL    ALT (SGPT) 31 7 - 40 U/L    AST (SGOT) 23 0 - 33 U/L    Alkaline Phosphatase 98 25 - 100 U/L    Total Bilirubin 0.3 0.3 - 1.2 mg/dL    eGFR Non African Amer 85 >60 mL/min/1.73    Globulin 2.2 gm/dL    A/G Ratio 1.6 1.5 - 2.5 g/dL    BUN/Creatinine Ratio 15.2 7.0 - 25.0    Anion Gap 6.0 3.0 - 11.0 mmol/L   BNP    Collection Time: 01/05/19  8:18 PM   Result Value Ref Range    BNP 24.0 0.0 - 100.0 pg/mL   Lactic Acid, Plasma    Collection Time: 01/05/19  8:18 PM   Result Value Ref Range    Lactate 0.9 0.5 - 2.0 mmol/L   CBC Auto Differential    Collection Time: 01/05/19  8:18 PM   Result Value Ref Range    WBC 14.11 (H) 3.50 - 10.80 10*3/mm3    RBC 4.13 3.89 - 5.14 10*6/mm3    Hemoglobin 12.2 11.5 - 15.5 g/dL    Hematocrit 37.8 34.5 - 44.0 %    MCV 91.5 80.0 - 99.0 fL    MCH 29.5 27.0 - 31.0 pg    MCHC 32.3 32.0 - 36.0 g/dL    RDW 12.8 11.3 - 14.5 %    RDW-SD 42.8 37.0 - 54.0 fl    MPV 10.0 6.0 - 12.0 fL    Platelets 330 150 - 450 10*3/mm3    Neutrophil % 84.1 (H) 41.0 - 71.0 %    Lymphocyte % 11.1 (L) 24.0 - 44.0 %    Monocyte % 4.3 0.0 - 12.0 %    Eosinophil % 0.4 0.0 - 3.0 %    Basophil % 0.1 0.0 - 1.0 %    Immature Grans % 0.4 0.0 - 0.6 %    Neutrophils, Absolute 11.86 (H) 1.50 - 8.30 10*3/mm3    Lymphocytes, Absolute 1.57 0.60 - 4.80 10*3/mm3    Monocytes, Absolute 0.60 0.00 - 1.00 10*3/mm3    Eosinophils, Absolute 0.06 0.00 - 0.30 10*3/mm3    Basophils, Absolute 0.02 0.00 - 0.20 10*3/mm3    Immature Grans, Absolute 0.05 (H) 0.00 - 0.03 10*3/mm3   CK    Collection Time: 01/05/19  8:18 PM   Result Value Ref Range    Creatine Kinase 32 26 - 174 U/L   POC Troponin, Rapid    Collection Time: 01/05/19  8:23 PM    Result Value Ref Range    Troponin I 0.00 0.00 - 0.07 ng/mL     Note: In addition to lab results from this visit, the labs listed above may include labs taken at another facility or during a different encounter within the last 24 hours. Please correlate lab times with ED admission and discharge times for further clarification of the services performed during this visit.    CT Abdomen Pelvis Without Contrast   Final Result   1. Fractured upper aspect of the left femur.   2. No acute traumatic injury is seen elsewhere in the abdomen or pelvis.    3. A few small gallstones without acute cholecystitis.   4. Colonic diverticula without acute diverticulitis.    5. Other findings as above.       THIS DOCUMENT HAS BEEN ELECTRONICALLY SIGNED BY SMILEY LOZADA MD      CT Chest Without Contrast   Final Result   Addendum 1 of 1   Addition to the TECHNIQUE section: Sagittal reformatted images were also    created and reviewed.       THIS DOCUMENT HAS BEEN ELECTRONICALLY SIGNED BY SMILEY LOZADA MD      Final   1. No acute traumatic injury is seen in the chest.   2. No dense consolidations in either lung. Some groundglass pneumonitis and/or    air trapping might otherwise be present.   Other findings as above.       THIS DOCUMENT HAS BEEN ELECTRONICALLY SIGNED BY SMILEY LOZADA MD      CT Cervical Spine Without Contrast   Final Result   1. No acute fracture or subluxation of the cervical spine.    2. Other findings as above.          THIS DOCUMENT HAS BEEN ELECTRONICALLY SIGNED BY SMILEY LOZADA MD      CT Head Without Contrast   Final Result   1. No acute intracranial injury or skull fracture.    2. Other findings as above.       THIS DOCUMENT HAS BEEN ELECTRONICALLY SIGNED BY SMILEY LOZADA MD      XR Femur 2 View Left   Final Result   1. Fractured upper aspect of the left femur.   2. Other findings as above.       THIS DOCUMENT HAS BEEN ELECTRONICALLY SIGNED BY SMILEY LOZADA MD      XR Hip With or Without Pelvis  "2 - 3 View Left    (Results Pending)     Vitals:    01/05/19 1830 01/05/19 1930 01/05/19 2127 01/05/19 2200   BP: 169/89      Pulse: 84 72     Resp: 20      Temp: 97.9 °F (36.6 °C)      TempSrc: Oral      SpO2: 94% (!) 83% 97% 93%   Weight: 45.7 kg (100 lb 12 oz)      Height: 170.2 cm (67\")        Medications   sodium chloride 0.9 % flush 10 mL (not administered)   Sodium chloride 0.9 % infusion (125 mL/hr Intravenous New Bag 1/5/19 2100)   Morphine sulfate (PF) injection 2 mg (not administered)   ondansetron (ZOFRAN) injection 4 mg (not administered)   HYDROmorphone (DILAUDID) injection 0.5 mg (0.5 mg Intravenous Given 1/5/19 1917)   HYDROmorphone (DILAUDID) injection 0.25 mg (0.25 mg Intravenous Given 1/5/19 2100)     ECG/EMG Results (last 24 hours)     Procedure Component Value Units Date/Time    ECG 12 Lead [[id number]] Collected:  01/05/19 2045     Updated:  01/05/19 2048    Narrative:       Test Reason : demented elder with hx of unwitnessed fall  Blood Pressure : **/** mmHG  Vent. Rate : 065 BPM     Atrial Rate : 065 BPM     P-R Int : 162 ms          QRS Dur : 080 ms      QT Int : 402 ms       P-R-T Axes : 057 -37 056 degrees     QTc Int : 418 ms    Normal sinus rhythm  Left axis deviation  Abnormal ECG  When compared with ECG of 26-DEC-2018 19:08,  No significant change was found  Confirmed by LENNY RAMOS MD ([id number]) on 1/5/2019 8:48:21 PM    Referred By:  RACHEL           Confirmed By:LENNY RAMOS MD                        [facility]    Final diagnoses:   Closed fracture of left femur, unspecified fracture morphology, unspecified portion of femur, initial encounter (CMS/[facility])   Dementia without behavioral disturbance, unspecified dementia type   History of CVA (cerebrovascular accident)   History of diabetes mellitus, type II       Documentation assistance provided by radha Pollard.  Information recorded by the scribe was done at my direction and has been verified and validated by me.     Guy Pollard  01/05/19 " 2002       Guy Pollard  01/05/19 2200       Susan Cotter, JOLYNN  01/05/19 7802

## 2019-01-06 NOTE — PROGRESS NOTES
[facility] Medicine Services  PROGRESS NOTE    Patient Name: Val Hill  : 1930  MRN: [id number]    Date of Admission: 2019  Length of Stay: 0  Primary Care Physician: Provider, No Known    Subjective   Subjective     CC:  F/u left hip fracture    HPI:  No family present.  Patient unable to provide reliable history.  Complains of sore throat, but denies hip pain.  No issues overnight.    Review of Systems  Unable to obtain secondary to dementia.    Objective   Objective     Vital Signs:   Temp:  [94.5 °F (34.7 °C)-98 °F (36.7 °C)] 94.5 °F (34.7 °C)  Heart Rate:  [61-84] 61  Resp:  [16-20] 16  BP: (118-169)/(65-89) 118/65        Physical Exam:  Constitutional: No acute distress, sleeping but arouses  HENT: NCAT, mucous membranes moist  Respiratory: Clear to auscultation bilaterally, respiratory effort normal   Cardiovascular: RRR, no murmurs, rubs, or gallops  Gastrointestinal: Positive bowel sounds, soft, nontender, nondistended  Musculoskeletal: No bilateral ankle edema, pain with passive motion of left leg.  Cast to left wrist.  Psychiatric: Appropriate affect, cooperative  Neurologic: Oriented x 1 only, speech with some mild dysarthria  Skin: No rashes      Results Reviewed:  I have personally reviewed current lab, radiology, and data and agree.    Results from last 7 days   Lab Units  19   02519   WBC 10*3/mm3  13.59*  14.11*   HEMOGLOBIN g/dL  10.9*  12.2   HEMATOCRIT %  33.8*  37.8   PLATELETS 10*3/mm3  281  330   INR   1.10*   --      Results from last 7 days   Lab Units  19   0259  19   SODIUM mmol/L  138   --   137   POTASSIUM mmol/L  3.5   --   3.9   CHLORIDE mmol/L  105   --   102   CO2 mmol/L  27.0   --   29.0   BUN mg/dL  8*   --   10   CREATININE mg/dL  0.59*   --   0.66   GLUCOSE mg/dL  182*   --   186*   CALCIUM mg/dL  8.2*   --   9.5   ALT (SGPT) U/L   --    --   31   AST (SGOT) U/L   --    --    23   TROPONIN I ng/mL   --   0.00   --      Estimated Creatinine Clearance: 35.1 mL/min (A) (by C-G formula based on SCr of 0.59 mg/dL (L)).    BNP   Date Value Ref Range Status   01/05/2019 24.0 0.0 - 100.0 pg/mL Final     Comment:     Results may be falsely decreased if patient taking Biotin.       Microbiology Results Abnormal     None          Imaging Results (last 24 hours)     Procedure Component Value Units Date/Time    XR Hip With or Without Pelvis 2 - 3 View Left [[id number]] Collected:  01/05/19 2157     Updated:  01/06/19 0003    Narrative:       EXAM:    XR Left Hip with Pelvis when Performed, 2 or 3 Views     EXAM DATE/TIME:    1/5/2019 9:57 PM     CLINICAL HISTORY:    88 years old, female; Unspecified dementia without behavioral disturbance;   Unspecified fracture of left femur, initial encounter for closed fracture;   Personal history of other endocrine, nutritional and metabolic disease;   Personal history of transient ischemic attack (tia), and cerebral infarction   without residual deficits; Pain; Hip pain; Left hip; Additional info: Need true   ap pelvis with internal rotation and lateral of l hip for operative planning     TECHNIQUE:    XR Left hip with pelvis when performed, 2 or 3 views     COMPARISON:    CT ABDOMEN PELVIS WO CONTRAST 1/5/2019 8:28 PM     FINDINGS:    Bones/joints:  No change in alignment of the acute displaced and comminuted   intertrochanteric fracture of the left femur. Mild osteoarthrosis of both hips,   osteopenia, degenerative spondylosis of the spine, and transitional anatomy of   the lumbosacral junction are again noted.    Soft tissues:  Soft tissue swelling of the left hip.       Impression:       No change in alignment of the acute displaced and comminuted intertrochanteric   fracture the left femur.     THIS DOCUMENT HAS BEEN ELECTRONICALLY SIGNED BY CRISTAL LEVINE MD    CT Abdomen Pelvis Without Contrast [[id number]] Collected:  01/05/19 2029     Updated:  01/05/19  2140    Narrative:       EXAM:    CT Abdomen and Pelvis Without Contrast     EXAM DATE/TIME:    1/5/2019 8:29 PM     CLINICAL HISTORY:    88 years old, female; Injury or trauma; Fall; Initial encounter;   Concussion/head injury; Additional info: Demented elder with HX of unwitnessed   fall     TECHNIQUE:    Axial computed tomography images of the abdomen and pelvis without contrast.    All CT scans at this facility use at least one of these dose optimization   techniques: automated exposure control; mA and/or kV adjustment per patient   size (includes targeted exams where dose is matched to clinical indication); or   iterative reconstruction.    Coronal and sagittal reformatted images were created and reviewed.     COMPARISON:    No comparison abdominal and pelvic imaging studies are available.    FINDINGS:   Please note: Sensitivity for the detection of traumatic injury is suboptimal   given the absence of contrast enhancement. Some overlying artifact from the   patient's  arm positioning. Otherwise:    Liver: Unremarkable.     Gallbladder: A few small calcified gallstones without definite evidence of   acute cholecystitis.     Spleen: Unremarkable.     Pancreas: Unremarkable.     Adrenal glands: Unremarkable right adrenal gland. Relatively prominent,   perhaps hyperplastic left adrenal gland.    Kidneys and urinary bladder: No urinary tract stones. No hydronephrosis or   acute kidney inflammation bilaterally. Grossly unremarkable urinary bladder.     Uterus and adnexa: No well-defined adnexal mass is seen bilaterally. Status   post hysterectomy.     Gastrointestinal tract: The stomach, small bowel, and colon are variably   opacified or decompressed but otherwise no definite acute obstruction or   inflammation is seen.  Relative prominence of the walls of some segments of the   stomach, small bowel, and colon may reflect lack of distention rather than   inflammation. At least sigmoid colon diverticula without acute  diverticulitis.   No acute appendicitis.    Fluid: No free fluid, or acute hematoma or abscess.     Gas: No free air.     Lymph nodes: No definite pathologic lymph node enlargement is seen.     Aorta: Nonaneurysmal abdominal aorta. No periaortic fluid. Aortic and branch   arterial calcified plaques.      Abdominal wall: There appears to be a degree of pelvic floor laxity.     Lung bases: No definite acute traumatic lung injury bilaterally. No pleural   effusions.     Skeletal structures: Somewhat displaced, impacted, and comminuted fracture of   the upper aspect of the left femur, largely involving the intertrochanteric and   to some extent the basicervical regions. Normally located left femoral head. No   acute fracture or subluxation is seen elsewhere. Spinal degenerative changes.   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 A degree of bone   demineralization is also noted.       Impression:       1. Fractured upper aspect of the left femur.  2. No acute traumatic injury is seen elsewhere in the abdomen or pelvis.   3. A few small gallstones without acute cholecystitis.  4. Colonic diverticula without acute diverticulitis.   5. Other findings as above.     THIS DOCUMENT HAS BEEN ELECTRONICALLY SIGNED BY SMILEY LOZADA MD    CT Chest Without Contrast [[id number]] Collected:  01/05/19 2029     Updated:  01/05/19 2127    Addenda:        Addition to the TECHNIQUE section: Sagittal reformatted images were also   created and reviewed.     THIS DOCUMENT HAS BEEN ELECTRONICALLY SIGNED BY SMILEY LOZADA MD  Signed:  01/05/19 2127 by Smiley Lozada MD    Narrative:       EXAM:    CT Chest Without Contrast     EXAM DATE/TIME:    1/5/2019 8:29 PM     CLINICAL HISTORY:    88 years old, female; Injury or trauma; Fall; Initial encounter; Concussion   /head injury; Additional info: Demented elder with HX of unwitnessed fall      TECHNIQUE:    Axial computed tomography images of the chest without intravenous contrast.    All CT scans at this facility use at least one of these dose optimization   techniques: automated exposure control; mA and/or kV adjustment per patient   size (includes targeted exams where dose is matched to clinical indication); or   iterative reconstruction.    Coronal reformatted images were created and reviewed.     COMPARISON:    CR XR CHEST 1 VW 12/26/2018 6:58 PM; no comparison CT scan of the chest is   available.    FINDINGS:     Please note: Detection of traumatic injury is limited by the absence of   intravenous contrast enhancement. Some overlying artifact from the patient's    arm positioning. Otherwise:    No mediastinal hematoma or pneumomediastinum. No pericardial effusion or   pneumopericardium. Poorly defined coronary artery calcified plaques.   Nonaneurysmal thoracic aorta.     No traumatic lung injury is seen in either lung. No dense consolidations are   seen. Otherwise noted are some tiny cystic-appearing spaces largely   peripherally with scarring and somewhat heterogeneous lung parenchyma that may   reflect groundglass pneumonitis and/or air trapping. It is not certain that an   interstitial pneumonia is present.    No definite pathologic lymph node enlargement.     No pleural effusion bilaterally.     No pneumothorax bilaterally.     No acute fracture or dislocation is seen. Chronic fracture of the posterior   aspects of the left 9th and 10th ribs. Spinal degenerative changes.    No definite acute traumatic injury is seen in the visualized upper abdomen.       Impression:       1. No acute traumatic injury is seen in the chest.  2. No dense consolidations in either lung. Some groundglass pneumonitis and/or   air trapping might otherwise be present.  Other findings as above.     THIS DOCUMENT HAS BEEN ELECTRONICALLY SIGNED BY SMILEY LOZADA MD    CT Cervical Spine Without Contrast [[id number]]  Collected:  01/05/19 2029     Updated:  01/05/19 2113    Narrative:       EXAM:    CT Cervical Spine Without Contrast     EXAM DATE/TIME:    1/5/2019 8:29 PM     CLINICAL HISTORY:    88 years old, female; Injury or trauma; Fall; Initial encounter; Concussion   /head injury; Additional info: Demented elder with HX of unwitnessed fall     TECHNIQUE:    Axial computed tomography images of the cervical spine without intravenous   contrast.    All CT scans at this facility use at least one of these dose optimization   techniques: automated exposure control; mA and/or kV adjustment per patient   size (includes targeted exams where dose is matched to clinical indication); or   iterative reconstruction.    Coronal and sagittal reformatted images were created and reviewed.     COMPARISON:    CT CERVICAL SPINE WO CONTRAST 12/29/2018 11:28 AM     FINDINGS:     No acute fracture or subluxation is seen. Degenerative disc disease, and   facet and uncovertebral arthropathy at multiple levels with neural foraminal   and spinal canal narrowing particularly at C5-C6, although the intervertebral   discs and other soft tissues are overall not optimally characterized. No   suspicious focal blastic or lytic bone lesion is seen. A degree of bone   demineralization is noted. Small accessory C7 ribs.    No gross paraspinous soft tissue injury is seen.       Impression:       1. No acute fracture or subluxation of the cervical spine.   2. Other findings as above.       THIS DOCUMENT HAS BEEN ELECTRONICALLY SIGNED BY SMILEY LOZADA MD    CT Head Without Contrast [[id number]] Collected:  01/05/19 2029     Updated:  01/05/19 2106    Narrative:       EXAM:    CT Head Without Contrast     EXAM DATE/TIME:    1/5/2019 8:29 PM     CLINICAL HISTORY:    88 years old, female; Injury or trauma; Fall; Initial encounter; Concussion /   head injury; Additional info: Demented elder with HX of unwitnessed fall     TECHNIQUE:    Axial computed tomography  images of the head/brain without contrast.    All CT scans at this facility use at least one of these dose optimization   techniques: automated exposure control; mA and/or kV adjustment per patient   size (includes targeted exams where dose is matched to clinical indication); or   iterative reconstruction.     COMPARISON:    CT HEAD WO CONTRAST 12/29/2018 11:28 AM     FINDINGS:     No intracranial hematoma, definite acute infarct, mass or mass-effect,   hydrocephalus, or skull fracture is seen. Please note that CT can underestimate   ischemia up to at least 24 hours after the event. Otherwise, chronic lacunar   infarct toward the left basal ganglia and internal capsule. Probable   microvascular ischemic white matter changes elsewhere. The gray-white matter   interface is preserved. Atrophic changes.     The visualized paranasal sinuses and mastoid air cells are overall   well-aerated.       Impression:       1. No acute intracranial injury or skull fracture.   2. Other findings as above.     THIS DOCUMENT HAS BEEN ELECTRONICALLY SIGNED BY SMILEY LOZADA MD    XR Femur 2 View Left [[id number]] Collected:  01/05/19 1920     Updated:  01/05/19 2101    Narrative:       EXAM:    XR Left Femur, 2 Views     EXAM DATE/TIME:    1/5/2019 7:20 PM     CLINICAL HISTORY:    88 years old, female; Pain; Other: Fall from standing left leg pain     TECHNIQUE:    XR Left femur, AP and lateral views     COMPARISON:    No comparison left femur imaging studies are available.     FINDINGS:   Some overlying artifact. Otherwise:    Impacted fracture toward the intertrochanteric and basicervical and region of   the left femur with a degree of medial angulation of the proximal major   fragment. The femoral head is otherwise normally located.  No left femur   fracture or dislocation is seen elsewhere. No suspicious focal blastic or lytic   bone lesion.     No radiopaque foreign body.       Impression:       1. Fractured upper aspect of the  left femur.  2. Other findings as above.     THIS DOCUMENT HAS BEEN ELECTRONICALLY SIGNED BY SMILEY LOZADA MD          Results for orders placed during the hospital encounter of 11/16/18   Adult Transthoracic Echo Complete W/ Cont if Necessary Per Protocol (With Agitated Saline)    Narrative · Left ventricular systolic function is normal. Estimated EF = 60%.  · Left ventricular diastolic dysfunction (grade I) consistent with   impaired relaxation.  · Mild mitral valve regurgitation is present  · Mild tricuspid valve regurgitation is present.  · Calculated right ventricular systolic pressure from tricuspid   regurgitation is 32 mmHg.  · Negative saline bubble study.          I have reviewed the medications:    Current Facility-Administered Medications:   •  acetaminophen (TYLENOL) tablet 650 mg, 650 mg, Oral, Q4H PRN, Jenny Groves APRN, 650 mg at 01/05/19 2340  •  atorvastatin (LIPITOR) tablet 40 mg, 40 mg, Oral, Nightly, Jenny Groves, APRN  •  dextrose (D50W) 25 g/ 50mL Intravenous Solution 25 g, 25 g, Intravenous, Q15 Min PRN, Jenny Groves APRN  •  dextrose (GLUTOSE) oral gel 15 g, 15 g, Oral, Q15 Min PRN, Jenny Groves, APRN  •  docusate sodium (COLACE) capsule 100 mg, 100 mg, Oral, Daily, Jenny Groves APRN  •  donepezil (ARICEPT) tablet 20 mg, 20 mg, Oral, Nightly, Jenny Groves, APRN, 20 mg at 01/05/19 2345  •  FLUoxetine (PROzac) capsule 20 mg, 20 mg, Oral, Daily, Jenny Groves APRN  •  glucagon (human recombinant) (GLUCAGEN DIAGNOSTIC) injection 1 mg, 1 mg, Subcutaneous, PRN, Jenny Groves, APRN  •  insulin lispro (humaLOG) injection 0-7 Units, 0-7 Units, Subcutaneous, 4x Daily With Meals & Nightly, Jenny Groves APRN  •  lisinopril (PRINIVIL,ZESTRIL) tablet 10 mg, 10 mg, Oral, Q24H, Jenny Groves, APRN  •  Morphine sulfate (PF) injection 1 mg, 1 mg, Intravenous, Q4H PRN, 1 mg at 01/06/19 4274 **AND** naloxone (NARCAN) injection  0.4 mg, 0.4 mg, Intravenous, Q5 Min PRN, Jenny Groves APRN  •  ondansetron (ZOFRAN) tablet 4 mg, 4 mg, Oral, Q6H PRN **OR** ondansetron (ZOFRAN) injection 4 mg, 4 mg, Intravenous, Q6H PRN, Jenny Groves, APRDOREEN  •  potassium chloride (MICRO-K) CR capsule 10 mEq, 10 mEq, Oral, Daily, Jenny Groves APRN  •  QUEtiapine (SEROquel) tablet 25 mg, 25 mg, Oral, Nightly, Jenny Groves APRN, 25 mg at 01/05/19 2346  •  [COMPLETED] Insert peripheral IV, , , Once **AND** sodium chloride 0.9 % flush 10 mL, 10 mL, Intravenous, PRN, Susan Cotter APRN  •  sodium chloride 0.9 % flush 3 mL, 3 mL, Intravenous, Q12H, Jenny Groves APRN  •  sodium chloride 0.9 % flush 3-10 mL, 3-10 mL, Intravenous, PRN, Jenny Groves APRDOREEN  •  Sodium chloride 0.9 % infusion, 75 mL/hr, Intravenous, Continuous, Stefan Chirinos MD, Last Rate: 125 mL/hr at 01/06/19 0643, 125 mL/hr at 01/06/19 0643  •  sucralfate (CARAFATE) tablet 1 g, 1 g, Oral, 4x Daily AC & at Bedtime, Jenny Groves APRN  •  vitamin B-12 (CYANOCOBALAMIN) tablet 1,000 mcg, 1,000 mcg, Oral, Daily, Jenny Groves APRN      Assessment/Plan   Assessment / Plan     Active Hospital Problems    Diagnosis Date Noted   • **Closed fracture of left femur (CMS/[facility]) [S72.92XA] 01/05/2019     Priority: Medium   • Chronic diastolic CHF (congestive heart failure) (CMS/[facility]) [I50.32] 01/06/2019   • Gallstones [K80.20] 01/05/2019   • Dementia [F03.90] 11/17/2018   • Type 2 diabetes mellitus (CMS/[facility]) [E11.9] 11/17/2018          Brief Hospital Course to date:  Val Hill is a 88 y.o. female with history of dementia, CVA in 11/2018, DM2 who presents with unwitnessed fall and found to have left femur fracture.  Also recent fall with left wrist fracture that was casted by Dr. Erwin.      - Dr. Yanez will evaluate patient today.  No family currently present to discuss, but ED notes that since CVA she has been non-ambulatory.  Will discuss  with Dr. Yanez re: decision re: possible surgery.  NPO for now.  - recent wrist fracture, casted.  Has scheduled f/u with Dr. Erwin  - hold metformin, continue SSI for now  - continue ASA and statin for recent stroke  - patient is DNR.  Expect further decline with this event.  +/- palliative eval depending on family wishes and how she does.    DVT Prophylaxis:  Mechanical in anticipation possible surgery    Disposition: I expect the patient to be discharged to SNF when medically ready..    CODE STATUS:   Code Status and Medical Interventions:   Ordered at: 01/05/19 3323     Limited Support to NOT Include:    Dialysis    Intubation    Artificial Nutrition     Level Of Support Discussed With:    Next of Kin (If No Surrogate)     Code Status:    No CPR     Medical Interventions (Level of Support Prior to Arrest):    Limited         Electronically signed by Stefan Chirnios MD, 01/06/19, 7:54 AM.

## 2019-01-06 NOTE — OP NOTE
HIP TROCANTERIC NAILING WITH INTRAMEDULLARY HIP SCREW  Procedure Report    Patient Name:  Val Hill  YOB: 1930    Date of Surgery:  01/06/19       Indications:  87 yo F s/p fall with resultant left hip pain. Clinical and radiographic studies were consistent with a basicervical femoral neck fracture. The risks and benefits of open/closed reduction and placement of a trochanteric femoral nail were discussed with the patient and family members, who expressed their understanding and wished to proceed with the procedure.    Pre-op Diagnosis:     Left basicervical femoral neck fracture      Post-op Diagnosis:     same    Procedure/CPT® Codes: 30992      Procedure(s):  HIP TROCANTERIC NAILING WITH INTRAMEDULLARY HIP SCREW    Staff:  Surgeon(s):  Hola Yanez Jr., MD    Assistant: none    Anesthesia: General    Estimated Blood Loss: 100 mL    Implants:  Synthes short TFNa Nail 11 mm. 90 mm helical blade with cement. 36 mm distal locking screw.        Specimen:                None      Findings: displaced basicervical femoral neck fracture    Complications: none apparent    Description of Procedure: After informed consent had been obtained and the operative site had been marked, the patient was taken back to the operating suite, where a timeout was performed with the entire surgical staff present. The patient underwent general anesthesia and was intubated, then transferred to the Beatriz table. All bony prominences were well padded, a perineal post was placed, and the well leg was placed in a well leg marti. The fractured leg was then placed in a well padded boot and attached to the Beatriz table. Appropriate gross traction was then placed to the adducted leg and slightly internally rotated leg. AP and lateral fluoroscopic views were then taken of the operative hip, demonstrating excellent reduction of the fracture. Using the fluoroscopic guides, we chose the appropriate diameter TFN and neck angle.  We then prepped and draped the operative extremity in the usual sterile fashion.    We first began the operation by aligning the entry guide wire with our starting point, along the tip of the greater trochanter. A small stab incision was then made proximal to the greater trochanter. The starting guide wire was then passed percutaneously to the greater trochanter. The guide wire was aligned so as to travel down the central aspect of the femoral canal on both the AP and lateral fluoroscopic views. The guide wire was then passed through the tip of the greater trochanter down the central aspect of the femoral canal, just past the level of the lesser trochanter. The small stab incision was then enlarged using a #10 blade. The soft tissue protector was then placed over top of the entry guide wire, followed by placement of the entry reamer. The entry reamer was then passed over top of the guide wire, down the canal of the femur approximate 1-2 cm distal to the level of the lesser trochanter. The entry guide wire and reamer were then removed from the hip. The appropriate diameter nail was then passed down the canal of the femur and malletted to the depth that would allow the helical blade to pass up the central aspect of the femoral neck and into the central aspect of the femoral head.    We then attached the outrigger guide for the helical blade onto the proximal aspect of the nail. A 3 cm incision was then made through the skin and ITB at the appropriate site for placement of our helical blade guide. The guide was then attached and advanced to the lateral cortex of the femur and locked in place. We then ensured on both the lateral and AP views that the trajectory of the guide would allow our guide pin to pass to the central aspect of the femoral head. We then passed our guide wire, ensuring placement into the central aspect of the head in both the AP and lateral views, so as to ensure a Tip-apex distance of <25 mm. After  our guide wire had been passed to approximately 3 mm short of the articular surface at the central aspect of the femoral head, we placed our measuring device over top of the guide wire. We obtained a measurement of 95 mm, thus we chose to use a 90 mm helical blade. The helical blade was then passed over top of the guide wire and malletted into place. We ensured via fluoroscopic guidance that the helical blade stopped short of the articular surface, but that it obtained a TAD of <25 mm. After placing the helical blade, we then removed the guide wire. The blade was locked into the nail proximally. Due to the porosity of her bone, we did inject cement into her femoral head though the foramina in the blade.    We then placed one distal interlocking screw through the distal aspect of the implant.    Final fluoroscopic views were taken, demonstrating excellent fracture reduction with a helical blade that had a TAD <25 mm.     The wounds were irrigated thoroughly. The subcutaenous tissue was closed with 2-0 vicryl in an interrupted fashion. The skin was closed with staples. Sterile dressings were then applied.    Then patient was then awakened from anesthesia, extubated, and transferred to the [facility]. The patient was then transferred to the post anesthesia care unit in stable condition.    The plan for the patient will be to be WBAT LLE. The patient will have a postoperative appointment in approximately 2 weeks for a wound check and suture removal.      Hola Yanez MD  01/06/19 12:01 PM

## 2019-01-07 NOTE — PLAN OF CARE
Problem: Patient Care Overview  Goal: Plan of Care Review  Outcome: Ongoing (interventions implemented as appropriate)   01/07/19 0840   Coping/Psychosocial   Plan of Care Reviewed With patient   OTHER   Outcome Summary Pt transferred bed to chair with MaxAx2 and B UE support. Pt limited by pain and confusion. PT will follow to increase strength and progress functional mobility. Recommend rehab at discharge

## 2019-01-07 NOTE — PLAN OF CARE
Problem: Patient Care Overview  Goal: Plan of Care Review  Outcome: Ongoing (interventions implemented as appropriate)   01/07/19 5269   Coping/Psychosocial   Plan of Care Reviewed With patient   Plan of Care Review   Progress improving   OTHER   Outcome Summary Pt. rested most of the night. Her pain is well controlled. VS WNL, will continue to monitor.

## 2019-01-07 NOTE — PROGRESS NOTES
[facility] Medicine Services  PROGRESS NOTE    Patient Name: Val Hill  : 1930  MRN: [id number]    Date of Admission: 2019  Length of Stay: 1  Primary Care Physician: Provider, No Known    Subjective   Subjective     CC:  F/u left hip fracture    HPI:  Resting in bed in no acute distress but overall she is not very comfortable.  Earlier today patient had orthostasis.    Review of Systems  Unable to obtain secondary to dementia.    Objective   Objective     Vital Signs:   Temp:  [96.5 °F (35.8 °C)-97.8 °F (36.6 °C)] 97.3 °F (36.3 °C)  Heart Rate:  [] 74  Resp:  [16-18] 16  BP: (107-150)/(59-92) 128/72        Physical Exam:  Constitutional: No acute distress, sleeping but arouses  HENT: NCAT, mucous membranes moist  Respiratory: Clear to auscultation bilaterally, respiratory effort normal   Cardiovascular: RRR, no murmurs, rubs, or gallops  Gastrointestinal: Positive bowel sounds, soft, nontender, nondistended  Musculoskeletal: No bilateral ankle edema, pain with passive motion of left leg.  Cast to left wrist.  Psychiatric: Appropriate affect, cooperative  Neurologic: Oriented x 1 only, speech with some mild dysarthria  Skin: No rashes      Results Reviewed:  I have personally reviewed current lab, radiology, and data and agree.    Results from last 7 days   Lab Units  19   WBC 10*3/mm3  13.59*  14.11*   HEMOGLOBIN g/dL  10.9*  12.2   HEMATOCRIT %  33.8*  37.8   PLATELETS 10*3/mm3  281  330   INR   1.10*   --      Results from last 7 days   Lab Units  19   02519   SODIUM mmol/L  138   --   137   POTASSIUM mmol/L  3.5   --   3.9   CHLORIDE mmol/L  105   --   102   CO2 mmol/L  27.0   --   29.0   BUN mg/dL  8*   --   10   CREATININE mg/dL  0.59*   --   0.66   GLUCOSE mg/dL  182*   --   186*   CALCIUM mg/dL  8.2*   --   9.5   ALT (SGPT) U/L   --    --   31   AST (SGOT) U/L   --    --   23   TROPONIN  I ng/mL   --   0.00   --      Estimated Creatinine Clearance: 35.1 mL/min (A) (by C-G formula based on SCr of 0.59 mg/dL (L)).    BNP   Date Value Ref Range Status   01/05/2019 24.0 0.0 - 100.0 pg/mL Final     Comment:     Results may be falsely decreased if patient taking Biotin.       Microbiology Results Abnormal     None          Imaging Results (last 24 hours)     Procedure Component Value Units Date/Time    FL C Arm During Surgery [[id number]] Collected:  01/06/19 1518     Updated:  01/07/19 1413    Narrative:       EXAMINATION: FL C ARM DURING SURGERY - 1/6/2019     INDICATION:  S72.92XA-Unspecified fracture of left femur, initial  encounter for closed fracture; F03.90-Unspecified dementia without  behavioral disturbance; Z86.73-Personal history of transient ischemic  attack (TIA), and cerebral infarction without residual deficits;  Z86.39-Personal history of other endocrine, nutritional and metabolic  disease. Left hip pain.     COMPARISON: NONE     FINDINGS: 52 seconds of fluoroscopy and 3 images used for nailing of the  left hip.  Please see the procedure report for full details.       Impression:       Fluoroscopy for nailing of the left hip.     DICTATED:   1/6/2019  EDITED/ls :   1/6/2019          This report was finalized on 1/7/2019 2:11 PM by Dr. Paula Crow MD.             Results for orders placed during the hospital encounter of 11/16/18   Adult Transthoracic Echo Complete W/ Cont if Necessary Per Protocol (With Agitated Saline)    Narrative · Left ventricular systolic function is normal. Estimated EF = 60%.  · Left ventricular diastolic dysfunction (grade I) consistent with   impaired relaxation.  · Mild mitral valve regurgitation is present  · Mild tricuspid valve regurgitation is present.  · Calculated right ventricular systolic pressure from tricuspid   regurgitation is 32 mmHg.  · Negative saline bubble study.          I have reviewed the medications:    Current Facility-Administered  Medications:   •  acetaminophen (TYLENOL) tablet 650 mg, 650 mg, Oral, Q4H PRN, Jenny Groves, APRN, 650 mg at 01/05/19 2340  •  aspirin chewable tablet 81 mg, 81 mg, Oral, Daily, Stefan Chirinos MD, 81 mg at 01/07/19 0845  •  atorvastatin (LIPITOR) tablet 40 mg, 40 mg, Oral, Nightly, Jenny Groves APRN, 40 mg at 01/06/19 2153  •  busPIRone (BUSPAR) tablet 5 mg, 5 mg, Oral, TID, Stefan Chirinos MD, 5 mg at 01/07/19 0846  •  dextrose (D50W) 25 g/ 50mL Intravenous Solution 25 g, 25 g, Intravenous, Q15 Min PRN, Jenny Groves, APRN  •  dextrose (GLUTOSE) oral gel 15 g, 15 g, Oral, Q15 Min PRN, Jenny Groves, APRN  •  docusate sodium (COLACE) capsule 100 mg, 100 mg, Oral, Daily, Jenny Groves, APRN, 100 mg at 01/07/19 0845  •  donepezil (ARICEPT) tablet 20 mg, 20 mg, Oral, Nightly, Jenny Groves, APRN, 20 mg at 01/06/19 2153  •  FLUoxetine (PROzac) capsule 20 mg, 20 mg, Oral, Daily, Jenny Groves, APRN, 20 mg at 01/07/19 0847  •  glucagon (human recombinant) (GLUCAGEN DIAGNOSTIC) injection 1 mg, 1 mg, Subcutaneous, PRN, Jenny Groves, APRN  •  HYDROcodone-acetaminophen (NORCO) 7.5-325 MG per tablet 1 tablet, 1 tablet, Oral, Q4H PRN, Hola Yanez Jr., MD, 1 tablet at 01/07/19 1229  •  insulin lispro (humaLOG) injection 0-7 Units, 0-7 Units, Subcutaneous, 4x Daily With Meals & Nightly, Jenny Groves, APRN, 2 Units at 01/07/19 1229  •  LORazepam (ATIVAN) injection 0.25 mg, 0.25 mg, Intravenous, Q6H PRN, Stefan Chirinos MD, 0.25 mg at 01/06/19 1624  •  Morphine sulfate (PF) injection 2 mg, 2 mg, Intravenous, Q4H PRN, 2 mg at 01/07/19 0730 **AND** naloxone (NARCAN) injection 0.4 mg, 0.4 mg, Intravenous, Q5 Min PRN, Stefan Chirinos MD  •  ondansetron (ZOFRAN) tablet 4 mg, 4 mg, Oral, Q6H PRN **OR** ondansetron (ZOFRAN) injection 4 mg, 4 mg, Intravenous, Q6H PRN, Jenny Groves, APRN  •  potassium chloride (MICRO-K) CR capsule 10 mEq, 10 mEq, Oral,  Daily, Jenny Groves APRN, 10 mEq at 01/07/19 0845  •  QUEtiapine (SEROquel) tablet 25 mg, 25 mg, Oral, Nightly, Jenny Groves APRN, 25 mg at 01/06/19 2153  •  sertraline (ZOLOFT) tablet 25 mg, 25 mg, Oral, Daily, Stefan Chirinos MD, 25 mg at 01/07/19 0846  •  [COMPLETED] Insert peripheral IV, , , Once **AND** sodium chloride 0.9 % flush 10 mL, 10 mL, Intravenous, PRN, Susan Cotter APRN  •  sodium chloride 0.9 % flush 3 mL, 3 mL, Intravenous, Q12H, Jenny Groves APRN, 3 mL at 01/07/19 1229  •  sodium chloride 0.9 % flush 3-10 mL, 3-10 mL, Intravenous, PRN, Jenny Groves APRN  •  Sodium chloride 0.9 % infusion, 75 mL/hr, Intravenous, Continuous, Stefan Chirinos MD, Last Rate: 75 mL/hr at 01/07/19 1229, 75 mL/hr at 01/07/19 1229  •  sucralfate (CARAFATE) tablet 1 g, 1 g, Oral, 4x Daily AC & at Bedtime, Jenny Groves APRN, 1 g at 01/07/19 1229  •  vitamin B-12 (CYANOCOBALAMIN) tablet 1,000 mcg, 1,000 mcg, Oral, Daily, Jenny Groves APRN, 1,000 mcg at 01/07/19 0846      Assessment/Plan   Assessment / Plan     Active Hospital Problems    Diagnosis Date Noted   • **Closed fracture of left femur (CMS/[facility]) [S72.92XA] 01/05/2019   • Chronic diastolic CHF (congestive heart failure) (CMS/[facility]) [I50.32] 01/06/2019   • Gallstones [K80.20] 01/05/2019   • Dementia [F03.90] 11/17/2018   • Type 2 diabetes mellitus (CMS/[facility]) [E11.9] 11/17/2018          Brief Hospital Course to date:  Val Hill is a 88 y.o. female with history of dementia, CVA in 11/2018, DM2 who presents with unwitnessed fall and found to have left femur fracture.  Also recent fall with left wrist fracture that was casted by Dr. Erwin.      - left femur fracture, S/P surgery on 1/6/19.    - DM    - advanced dementia.    - patient is DNR.  Expect further decline with this event.  +/- palliative eval depending on family wishes and how she does.   PLAN:  - cont current care  - labs nadia lee.    DVT Prophylaxis:   Mechanical in anticipation possible surgery    Disposition: I expect the patient to be discharged to SNF when medically ready..    CODE STATUS:   Code Status and Medical Interventions:   Ordered at: 01/05/19 2247     Limited Support to NOT Include:    Dialysis    Intubation    Artificial Nutrition     Level Of Support Discussed With:    Next of Kin (If No Surrogate)     Code Status:    No CPR     Medical Interventions (Level of Support Prior to Arrest):    Limited         Electronically signed by Leon Ma MD, 01/07/19, 4:37 PM.

## 2019-01-07 NOTE — PROGRESS NOTES
Orthopedic Daily Progress Note      CC: Patient with slight confusion overnight.    Pain well controlled  General: no fevers, chills  Abdomen: no nausea, vomiting, or diarrhea    No other complaints    Physical Exam:  I have reviewed the vital signs.  Temp:  [94.5 °F (34.7 °C)-98.1 °F (36.7 °C)] 96.5 °F (35.8 °C)  Heart Rate:  [65-89] 89  Resp:  [14-20] 16  BP: (115-170)/(59-92) 132/92    Objective  General Appearance:    Alert, cooperative, no distress  Extremities: No clubbing, cyanosis, or edema to lower extremities  Pulses:  2+ in distal surgical extremity  Skin: Dressing Clean/dry/intact      Results Review:    I have reviewed the labs, radiology results and diagnostic studies:no new labs this AM    Results from last 7 days   Lab Units  01/06/19   0259   WBC 10*3/mm3  13.59*   HEMOGLOBIN g/dL  10.9*   PLATELETS 10*3/mm3  281     Results from last 7 days   Lab Units  01/06/19   0259   SODIUM mmol/L  138   POTASSIUM mmol/L  3.5   CO2 mmol/L  27.0   CREATININE mg/dL  0.59*   GLUCOSE mg/dL  182*       I have reviewed the medications.    Assessment/Problem List  POD# 1 Day Post-Op   S/p L TFN for IT femur fracture    Plan  WBAT LLE  PT/OT  DVT ppx with Lovenox 40 mg sq daily x 2 weeks followed by  mg daily x 4 weeks        Discharge Planning: I expect patient to be discharged to TBD in TBD days.    Hola Yanez Jr., MD  01/07/19  8:51 AM

## 2019-01-07 NOTE — PROGRESS NOTES
Discharge Planning Assessment  [facility]     Patient Name: Val Hill  MRN: [id number]  Today's Date: 1/7/2019    Admit Date: 1/5/2019    Discharge Needs Assessment     Row Name 01/07/19 1150       Living Environment    Lives With  facility resident    Current Living Arrangements  independent/assisted living facility    Primary Care Provided by  other (see comments) [facility]    Provides Primary Care For  no one, unable/limited ability to care for self    Family Caregiver if Needed  child(jeovany), adult    Able to Return to Prior Arrangements  yes       Transition Planning    Patient/Family Anticipates Transition to  home       Discharge Needs Assessment    Equipment Currently Used at Home  wheelchair        Discharge Plan     Row Name 01/07/19 1151       Plan    Plan  Short term rehab    Patient/Family in Agreement with Plan  yes    Plan Comments  Spoke with patient's son, Tapan, via phone. Patient lives in an assisted living apt on the Memory Care Unit at [facility] at [facility]. PTA she required assist with ADL's and was WC dependent, requiring assist with transfers. Per therapy recs patient would benefit from short term rehab at NH, patient will also need to be back to [facility] before returning to assisted living at [facility]. Patient's son is agreeable and is looking at [facility], [place], and [facility]. He will notify CM once he decides where he would like referrals to be made. CM following.     Final Discharge Disposition Code  03 - skilled nursing facility (SNF)        Destination      No service coordination in this encounter.      Durable Medical Equipment      No service coordination in this encounter.      Dialysis/Infusion      No service coordination in this encounter.      Home Medical Care      No service coordination in this encounter.      Community Resources      No service coordination in this encounter.        Expected Discharge Date and Time     Expected Discharge  Date Expected Discharge Time    Jan 11, 2019         Demographic Summary     Row Name 01/07/19 1150       General Information    Arrived From  home    Reason for Consult  discharge planning        Functional Status     Row Name 01/07/19 1150       Functional Status    Usual Activity Tolerance  fair    Current Activity Tolerance  poor        Psychosocial    No documentation.       Abuse/Neglect    No documentation.       Legal    No documentation.       Substance Abuse    No documentation.       Patient Forms    No documentation.           Rhona Mace RN

## 2019-01-07 NOTE — THERAPY EVALUATION
Acute Care - Physical Therapy Initial Evaluation  [facility]     Patient Name: Val Hill  : 1930  MRN: [id number]  Today's Date: 2019   Onset of Illness/Injury or Date of Surgery: 19  Date of Referral to PT: 19  Referring Physician: MD Beau      Admit Date: 2019    Visit Dx:     ICD-10-CM ICD-9-CM   1. Closed fracture of left femur, unspecified fracture morphology, unspecified portion of femur, initial encounter (CMS/Formerly [facility]) S72.92XA 821.00   2. Dementia without behavioral disturbance, unspecified dementia type F03.90 294.20   3. History of CVA (cerebrovascular accident) Z86.73 V12.54   4. History of diabetes mellitus, type II Z86.39 V12.29   5. Impaired functional mobility, balance, gait, and endurance Z74.09 V49.89     Patient Active Problem List   Diagnosis   • Stroke (CMS/Formerly [facility])   • UTI (urinary tract infection)   • Metabolic encephalopathy   • Type 2 diabetes mellitus (CMS/Formerly [facility])   • Dementia   • Closed fracture of left femur (CMS/Formerly [facility])   • Gallstones   • Chronic diastolic CHF (congestive heart failure) (CMS/Formerly [facility])     Past Medical History:   Diagnosis Date   • Dementia    • Diabetes mellitus (CMS/Formerly [facility])    • Hyperlipidemia    • Hypertension    • Stroke (CMS/Formerly [facility])      Past Surgical History:   Procedure Laterality Date   • ELBOW PROCEDURE     • HYSTERECTOMY          PT ASSESSMENT (last 12 hours)      Physical Therapy Evaluation     Row Name 19 0840          PT Evaluation Time/Intention    Subjective Information  no complaints  -MJ     Document Type  evaluation  -MJ     Mode of Treatment  physical therapy  -MJ     Patient Effort  adequate  -MJ     Symptoms Noted During/After Treatment  dizziness;increased pain  -MJ     Comment  BP taken as below; RN present and aware; pt yells out with any movement of the L LE  -MJ     Row Name 19 0806          General Information    Patient Profile Reviewed?  yes  -MJ     Onset of Illness/Injury or Date of Surgery  19  -MJ      Referring Physician  MD Beau  -     Patient Observations  alert;cooperative;agree to therapy  -     General Observations of Patient  Pt supine in bed, IV, SCDs, exit alarm, cast to L UE. RN present  -MJ     Prior Level of Function  -- unknown, pt poor historian and no family present  -MJ     Equipment Currently Used at Home  -- unknown, pt poor historian and no family present  -MJ     Pertinent History of Current Functional Problem  Pt presents with L hip pain after a fall, found to have L basicervical femoral neck fx  -MJ     Existing Precautions/Restrictions  fall;other (see comments) dementia/exit alarms  -     Limitations/Impairments  safety/cognitive  -     Risks Reviewed  patient:;LOB;increased discomfort;dizziness  -     Benefits Reviewed  patient:;improve function;increase independence;increase strength;increase balance;decrease pain  -     Barriers to Rehab  previous functional deficit;cognitive status  -     Row Name 01/07/19 0840          Relationship/Environment    Lives With  facility resident  -     Row Name 01/07/19 0840          Resource/Environmental Concerns    Current Living Arrangements  extended care facility  -     Row Name 01/07/19 0840          Cognitive Assessment/Intervention- PT/OT    Orientation Status (Cognition)  oriented to;person;disoriented to;place;situation;time  -     Follows Commands (Cognition)  follows one step commands;25-49% accuracy;verbal cues/prompting required;repetition of directions required  -     Safety Deficit (Cognitive)  severe deficit  -     Row Name 01/07/19 0840          Safety Issues, Functional Mobility    Impairments Affecting Function (Mobility)  balance;cognition;pain;range of motion (ROM);strength  -     Row Name 01/07/19 0840          Mobility Assessment/Treatment    Extremity Weight-bearing Status  left lower extremity  -     Left Lower Extremity (Weight-bearing Status)  weight-bearing as tolerated (WBAT)  -     Row Name  01/07/19 0840          Bed Mobility Assessment/Treatment    Bed Mobility Assessment/Treatment  supine-sit  -     Supine-Sit [place] (Bed Mobility)  maximum assist (25% patient effort);2 person assist;verbal cues  -MJ     Bed Mobility, Safety Issues  cognitive deficits limit understanding;decreased use of arms for pushing/pulling;decreased use of legs for bridging/pushing  -     Assistive Device (Bed Mobility)  bed rails;head of bed elevated;draw sheet  -     Comment (Bed Mobility)  Verbal cues to move LEs off EOB and to use UEs to push trunk to sitting. Pt initially assists with scooting legs and reaches for bedrail, but then required MaxAx2 with trunk and legs due to pain  -     Row Name 01/07/19 0840          Transfer Assessment/Treatment    Transfer Assessment/Treatment  sit-stand transfer;stand-sit transfer;bed-chair transfer  -     Comment (Transfers)  Verbal cues for correct hand placement on PT and tech's arms. Assisted pt from bed to chair by stand pivot, pt yelled out in pain during transfer.  -MJ     Bed-Chair [place] (Transfers)  maximum assist (25% patient effort);2 person assist;verbal cues  -     Assistive Device (Bed-Chair Transfers)  other (see comments) B UE support, gait belt  -     Sit-Stand [place] (Transfers)  maximum assist (25% patient effort);2 person assist  -     Stand-Sit [place] (Transfers)  maximum assist (25% patient effort);2 person assist;verbal cues  -     Row Name 01/07/19 0840          Sit-Stand Transfer    Assistive Device (Sit-Stand Transfers)  other (see comments) B UE support, gait belt  -     Row Name 01/07/19 0840          Stand-Sit Transfer    Assistive Device (Stand-Sit Transfers)  other (see comments) B UE support, gait belt  -     Row Name 01/07/19 0840          Gait/Stairs Assessment/Training    [place] Level (Gait)  unable to assess  -     Row Name 01/07/19 0840          General ROM    GENERAL ROM COMMENTS  L LE AROM  impaired 25%  -     Row Name 01/07/19 0840          MMT (Manual Muscle Testing)    General MMT Comments  L LE 3-/5; R LE 4/5  -     Row Name 01/07/19 0840          Motor Assessment/Intervention    Additional Documentation  Balance (Group)  -     Row Name 01/07/19 0840          Balance    Balance  static sitting balance  -     Row Name 01/07/19 0840          Static Sitting Balance    Level of [place] (Unsupported Sitting, Static Balance)  supervision  -     Sitting Position (Unsupported Sitting, Static Balance)  sitting on edge of bed  -     Time Able to Maintain Position (Unsupported Sitting, Static Balance)  more than 5 minutes  -     Row Name 01/07/19 0840          Sensory Assessment/Intervention    Sensory General Assessment  no sensation deficits identified denies numbness/tingling; can actively DF both ankles  -     Row Name 01/07/19 0840          Pain Assessment    Additional Documentation  Pain Scale: Numbers Pre/Post-Treatment (Group);Pain Scale: FACES Pre/Post-Treatment (Group)  -[facility] Name 01/07/19 0840          Pain Scale: Numbers Pre/Post-Treatment    Pain Location - Side  Left  -     Pain Location  hip  -     Pain Intervention(s)  Repositioned;Ambulation/increased activity  -     Row Name 01/07/19 0840          Pain Scale: FACES Pre/Post-Treatment    Pain: FACES Scale, Pretreatment  0-->no hurt  -     Pain: FACES Scale, Post-Treatment  2-->hurts little bit  -     Row Name             Wound 01/06/19 1407 Left hip incision    Wound - Properties Group Date first assessed: 01/06/19  -TB Time first assessed: 1407  -TB Side: Left  -TB Location: hip  -TB Type: incision  -TB    Row Name 01/07/19 0840          Plan of Care Review    Plan of Care Reviewed With  patient  -     Row Name 01/07/19 0840          Physical Therapy Clinical Impression    Date of Referral to PT  01/07/19  -     PT Diagnosis (PT Clinical Impression)  s/p L hip trochanteric nailing w/ IM nailing  -      Criteria for Skilled Interventions Met (PT Clinical Impression)  yes;treatment indicated  -MJ     Care Plan Review (PT)  evaluation/treatment results reviewed;care plan/treatment goals reviewed;risks/benefits reviewed;patient/other agree to care plan  -MJ     Row Name 01/07/19 0840          Vital Signs    Intra Systolic BP Rehab  96  -MJ     Intra Treatment Diastolic BP  60  -MJ     Intra Patient Position  Sitting  -MJ     Row Name 01/07/19 0840          Physical Therapy Goals    Bed Mobility Goal Selection (PT)  bed mobility, PT goal 1  -MJ     Transfer Goal Selection (PT)  transfer, PT goal 1  -MJ     Gait Training Goal Selection (PT)  gait training, PT goal 1  -MJ     Row Name 01/07/19 0840          Bed Mobility Goal 1 (PT)    Activity/Assistive Device (Bed Mobility Goal 1, PT)  sit to supine/supine to sit  -MJ     [place] Level/Cues Needed (Bed Mobility Goal 1, PT)  moderate assist (50-74% patient effort)  -MJ     Time Frame (Bed Mobility Goal 1, PT)  5 days;long term goal (LTG)  -MJ     Progress/Outcomes (Bed Mobility Goal 1, PT)  goal ongoing  -     Row Name 01/07/19 0840          Transfer Goal 1 (PT)    Activity/Assistive Device (Transfer Goal 1, PT)  sit-to-stand/stand-to-sit;walker, rolling  -MJ     [place] Level/Cues Needed (Transfer Goal 1, PT)  moderate assist (50-74% patient effort)  -MJ     Time Frame (Transfer Goal 1, PT)  5 days;long term goal (LTG)  -MJ     Progress/Outcome (Transfer Goal 1, PT)  goal ongoing  -     Row Name 01/07/19 0840          Gait Training Goal 1 (PT)    Activity/Assistive Device (Gait Training Goal 1, PT)  gait (walking locomotion);walker, rolling  -MJ     [place] Level (Gait Training Goal 1, PT)  moderate assist (50-74% patient effort) x2  -MJ     Distance (Gait Goal 1, PT)  5  -MJ     Time Frame (Gait Training Goal 1, PT)  5 days;long term goal (LTG)  -MJ     Progress/Outcome (Gait Training Goal 1, PT)  goal ongoing  -     Row Name 01/07/19 0840           Positioning and Restraints    Pre-Treatment Position  in bed  -     Post Treatment Position  chair  -MJ     In Chair  notified nsg;reclined;call light within reach;encouraged to call for assist;exit alarm on;on mechanical lift sling;waffle cushion;with nsg;LLE elevated  -       User Key  (r) = Recorded By, (t) = Taken By, (c) = Cosigned By    Initials Name Provider Type    TB Kirti Bishop, RN Registered Nurse    Forrest Singh, PT Physical Therapist        Physical Therapy Education     Title: PT OT SLP Therapies (In Progress)     Topic: Physical Therapy (In Progress)     Point: Mobility training (In Progress)     Learning Progress Summary           Patient Acceptance, E,D, NR by  at 1/7/2019  8:40 AM    Comment:  Edu re: benefits of mobility, WB status                   Point: Body mechanics (In Progress)     Learning Progress Summary           Patient Acceptance, E,D, NR by  at 1/7/2019  8:40 AM    Comment:  Edu re: benefits of mobility, WB status                   Point: Precautions (In Progress)     Learning Progress Summary           Patient Acceptance, E,D, NR by  at 1/7/2019  8:40 AM    Comment:  Edu re: benefits of mobility, WB status                               User Key     Initials Effective Dates Name Provider Type Discipline     04/03/18 -  Forrest Doe, PT Physical Therapist PT              PT Recommendation and Plan  Anticipated Discharge Disposition (PT): skilled nursing facility  Planned Therapy Interventions (PT Eval): balance training, bed mobility training, gait training, home exercise program, patient/family education, strengthening, transfer training  Therapy Frequency (PT Clinical Impression): daily  Outcome Summary/Treatment Plan (PT)  Anticipated Equipment Needs at Discharge (PT): (none)  Anticipated Discharge Disposition (PT): skilled nursing facility  Plan of Care Reviewed With: patient  Outcome Summary: Pt transferred bed to chair with MaxAx2 and B UE support. Pt  limited by pain and confusion. PT will follow to increase strength and progress functional mobility. Recommend rehab at discharge  Outcome Measures     Row Name 01/07/19 0840             How much help from another person do you currently need...    Turning from your back to your side while in flat bed without using bedrails?  2  -MJ      Moving from lying on back to sitting on the side of a flat bed without bedrails?  2  -MJ      Moving to and from a bed to a chair (including a wheelchair)?  2  -MJ      Standing up from a chair using your arms (e.g., wheelchair, bedside chair)?  2  -MJ      Climbing 3-5 steps with a railing?  1  -MJ      To walk in hospital room?  1  -MJ      AM-PAC 6 Clicks Score  10  -         Functional Assessment    Outcome Measure Options  AM-PAC 6 Clicks Basic Mobility (PT)  -        User Key  (r) = Recorded By, (t) = Taken By, (c) = Cosigned By    Initials Name Provider Type    Forrest Singh, PT Physical Therapist         Time Calculation:   PT Charges     Row Name 01/07/19 0930             Time Calculation    Start Time  0840  -MJ      PT Received On  01/07/19  -      PT Goal Re-Cert Due Date  01/17/19  -         Time Calculation- PT    Total Timed Code Minutes- PT  0 minute(s)  -        User Key  (r) = Recorded By, (t) = Taken By, (c) = Cosigned By    Initials Name Provider Type    Forrest Singh, PT Physical Therapist        Therapy Suggested Charges     Code   Minutes Charges    None           Therapy Charges for Today     Code Description Service Date Service Provider Modifiers Qty    76977931056 HC PT EVAL MOD COMPLEXITY 4 1/7/2019 Forrest Doe, PT GP 1    93793812193 HC PT THER SUPP EA 15 MIN 1/7/2019 Forrest Doe, PT GP 2          PT G-Codes  Outcome Measure Options: AM-PAC 6 Clicks Basic Mobility (PT)  AM-PAC 6 Clicks Score: 10      Forrest Doe PT  1/7/2019

## 2019-01-08 NOTE — THERAPY TREATMENT NOTE
Acute Care - Physical Therapy Treatment Note  [facility]     Patient Name: Val Hill  : 1930  MRN: [id number]  Today's Date: 2019  Onset of Illness/Injury or Date of Surgery: 18  Date of Referral to PT: 19  Referring Physician: MD Beau     Admit Date: 2019    Visit Dx:    ICD-10-CM ICD-9-CM   1. Closed fracture of left femur, unspecified fracture morphology, unspecified portion of femur, initial encounter (CMS/[facility]) S72.92XA 821.00   2. Dementia without behavioral disturbance, unspecified dementia type F03.90 294.20   3. History of CVA (cerebrovascular accident) Z86.73 V12.54   4. History of diabetes mellitus, type II Z86.39 V12.29   5. Impaired functional mobility, balance, gait, and endurance Z74.09 V49.89   6. Impaired mobility and ADLs Z74.09 799.89     Patient Active Problem List   Diagnosis   • Stroke (CMS/[facility])   • UTI (urinary tract infection)   • Metabolic encephalopathy   • Type 2 diabetes mellitus (CMS/[facility])   • Dementia   • Closed fracture of left femur (CMS/[facility])   • Gallstones   • Chronic diastolic CHF (congestive heart failure) (CMS/[facility])       Therapy Treatment    Rehabilitation Treatment Summary     Row Name 19 1410             Treatment Time/Intention    Discipline  physical therapist  -      Document Type  therapy note (daily note)  -      Subjective Information  complains of;pain  -MJ      Mode of Treatment  physical therapy  -MJ      Patient/Family Observations  Pt sitting [facility]  -      Care Plan Review  patient/other agree to care plan  -MJ      Patient Effort  adequate  -      Existing Precautions/Restrictions  fall;other (see comments) dementia/exit alarm; cast to L UE  -MJ      Recorded by [MJ] Forrest Doe, PT 19 1434      Row Name 19 1410             Cognitive Assessment/Intervention    Additional Documentation  Cognitive Assessment/Intervention (Group)  -MJ      Recorded by [ALONSO] Forrest Doe, PT 19 1434      Row Name  01/08/19 1410             Cognitive Assessment/Intervention- PT/OT    Affect/Mental Status (Cognitive)  confused  -MJ      Orientation Status (Cognition)  oriented to;person;disoriented to;place;situation;time  -MJ      Follows Commands (Cognition)  follows one step commands;50-74% accuracy;verbal cues/prompting required  -MJ      Safety Deficit (Cognitive)  severe deficit  -MJ      Recorded by [MJ] Forrest Doe, PT 01/08/19 1434      Row Name 01/08/19 1410             Mobility Assessment/Intervention    Extremity Weight-bearing Status  left lower extremity  -MJ      Left Lower Extremity (Weight-bearing Status)  weight-bearing as tolerated (WBAT)  -MJ      Recorded by [MJ] Forrest Doe, PT 01/08/19 1434      Row Name 01/08/19 1410             Bed Mobility Assessment/Treatment    Comment (Bed Mobility)  NT- pt UIC  -MJ      Recorded by [MJ] Forrest Doe, PT 01/08/19 1434      Row Name 01/08/19 1410             Transfer Assessment/Treatment    Transfer Assessment/Treatment  sit-stand transfer;stand-sit transfer  -MJ      Comment (Transfers)  Verbal cues for hand placement on PT/tech's arms. Cues for upright posture in standing  -MJ      Recorded by [MJ] Forrest Doe, PT 01/08/19 1434      Row Name 01/08/19 1410             Sit-Stand Transfer    Sit-Stand [place] (Transfers)  maximum assist (25% patient effort);2 person assist;verbal cues  -MJ      Assistive Device (Sit-Stand Transfers)  other (see comments) B UE support, gait belt  -MJ      Recorded by [MJ] Forrest Doe, PT 01/08/19 1434      Row Name 01/08/19 1410             Stand-Sit Transfer    Stand-Sit [place] (Transfers)  maximum assist (25% patient effort);2 person assist;verbal cues  -MJ      Assistive Device (Stand-Sit Transfers)  other (see comments) B UE support, gait belt  -MJ      Recorded by [MJ] Forrest Doe, PT 01/08/19 1434      Row Name 01/08/19 1410             Gait/Stairs Assessment/Training    [place] Level (Gait)  unable to assess   -MJ      Comment (Gait/Stairs)  Pt unable to achieve fully upright posture and sits back down quickly after standing, refused further attempts at gait  -MJ      Recorded by [MJ] Forrest Doe, PT 01/08/19 1434      Row Name 01/08/19 1410             Motor Skills Assessment/Interventions    Additional Documentation  Therapeutic Exercise (Group);Therapeutic Exercise Interventions (Group)  -MJ      Recorded by [MJ] Forrest Doe, PT 01/08/19 1434      Row Name 01/08/19 1410             Therapeutic Exercise    11665 - PT Therapeutic Exercise Minutes  10  -MJ      73135 - PT Therapeutic Activity Minutes  5  -MJ      Recorded by [MJ] Forrest Doe, PT 01/08/19 1434      Row Name 01/08/19 1410             Therapeutic Exercise    Lower Extremity (Therapeutic Exercise)  heel slides, left;LAQ (long arc quad), left;quad sets, left;SAQ (short arc quad), left;SLR (straight leg raise), left  -MJ      Lower Extremity Range of Motion (Therapeutic Exercise)  hip flexion/extension, left;hip abduction/adduction, left;ankle dorsiflexion/plantar flexion, left  -MJ      Exercise Type (Therapeutic Exercise)  AAROM (active assistive range of motion)  -MJ      Position (Therapeutic Exercise)  seated  -MJ      Sets/Reps (Therapeutic Exercise)  10x each  -MJ      Comment (Therapeutic Exercise)  Cues for technique. ModA w/ SLR, heel slides; [place] w/ hip abd, hip flx, SAQ  -MJ      Recorded by [MJ] Forrest Doe, PT 01/08/19 1434      Row Name 01/08/19 1410             Balance    Balance  static standing balance  -MJ      Recorded by [MJ] Forrest Doe, PT 01/08/19 1434      Row Name 01/08/19 1410             Static Standing Balance    Level of [place] (Supported Standing, Static Balance)  maximal assist, 25 to 49% patient effort x2  -MJ      Time Able to Maintain Position (Supported Standing, Static Balance)  15 to 30 seconds  -MJ      Assistive Device Utilized (Supported Standing, Static Balance)  other (see comments) B UE support  -MJ       Comment (Supported Standing, Static Balance)  Cues for upright posture, pt remains forward flexed and yells out in pain  -MJ      Recorded by [MJ] Forrest Doe, PT 01/08/19 1434      Row Name 01/08/19 1410             Positioning and Restraints    Pre-Treatment Position  sitting in chair/recliner  -MJ      Post Treatment Position  chair  -MJ      In Chair  notified nsg;reclined;call light within reach;encouraged to call for assist;exit alarm on;on mechanical lift sling  -MJ      Recorded by [MJ] Forrest Doe, PT 01/08/19 1434      Row Name 01/08/19 1410             Pain Scale: Numbers Pre/Post-Treatment    Pain Location - Side  Left  -MJ      Pain Location  hip  -MJ      Pain Intervention(s)  Repositioned;Ambulation/increased activity  -MJ      Recorded by [MJ] Forrest Doe, PT 01/08/19 1434      Row Name 01/08/19 1410             Pain Scale: FACES Pre/Post-Treatment    Pain: FACES Scale, Pretreatment  0-->no hurt  -MJ      Pain: FACES Scale, Post-Treatment  2-->hurts little bit  -MJ      Recorded by [MJ] Forrest Doe, PT 01/08/19 1434      Row Name                Wound 01/06/19 1407 Left hip incision    Wound - Properties Group Date first assessed: 01/06/19 [TB] Time first assessed: 1407 [TB] Side: Left [TB] Location: hip [TB] Type: incision [TB] Recorded by:  [TB] Kirti Bishop RN 01/06/19 1407    Row Name 01/08/19 1410             Plan of Care Review    Plan of Care Reviewed With  patient  -MJ      Recorded by [MJ] Forrest Doe, PT 01/08/19 1434      Row Name 01/08/19 1410             Outcome Summary/Treatment Plan (PT)    Daily Summary of Progress (PT)  progress toward functional goals is gradual  -MJ      Recorded by [MJ] Forrest Doe, PT 01/08/19 1434        User Key  (r) = Recorded By, (t) = Taken By, (c) = Cosigned By    Initials Name Effective Dates Discipline    TB Kirti Bishop RN 06/16/16 -  Nurse    Forrest Singh, PT 04/03/18 -  PT          Wound 01/06/19 1407 Left hip incision (Active)    Dressing Appearance dry;intact 1/8/2019 12:00 PM   Closure JULISA 1/8/2019  8:00 AM   Drainage Amount scant 1/8/2019  8:00 AM   Dressing Care, Wound other (see comments) 1/7/2019  8:30 PM           Physical Therapy Education     Title: PT OT SLP Therapies (In Progress)     Topic: Physical Therapy (In Progress)     Point: Mobility training (In Progress)     Learning Progress Summary           Patient Acceptance, E,D, NR by  at 1/8/2019  2:10 PM    Comment:  Reviewed HEP, benefits of mobility    Acceptance, E,D, NR by  at 1/7/2019  8:40 AM    Comment:  Edu re: benefits of mobility, WB status                   Point: Home exercise program (In Progress)     Learning Progress Summary           Patient Acceptance, E,D, NR by  at 1/8/2019  2:10 PM    Comment:  Reviewed HEP, benefits of mobility                   Point: Body mechanics (In Progress)     Learning Progress Summary           Patient Acceptance, E,D, NR by  at 1/8/2019  2:10 PM    Comment:  Reviewed HEP, benefits of mobility    Acceptance, E,D, NR by  at 1/7/2019  8:40 AM    Comment:  Edu re: benefits of mobility, WB status                   Point: Precautions (In Progress)     Learning Progress Summary           Patient Acceptance, E,D, NR by  at 1/8/2019  2:10 PM    Comment:  Reviewed HEP, benefits of mobility    Acceptance, E,D, NR by  at 1/7/2019  8:40 AM    Comment:  Edu re: benefits of mobility, WB status                               User Key     Initials Effective Dates Name Provider Type Discipline     04/03/18 -  Forrest Doe, PT Physical Therapist PT                PT Recommendation and Plan  Anticipated Discharge Disposition (PT): skilled nursing facility  Planned Therapy Interventions (PT Eval): balance training, bed mobility training, gait training, home exercise program, patient/family education, strengthening, transfer training  Therapy Frequency (PT Clinical Impression): daily  Outcome Summary/Treatment Plan (PT)  Daily Summary of  Progress (PT): progress toward functional goals is gradual  Anticipated Equipment Needs at Discharge (PT): (none)  Anticipated Discharge Disposition (PT): skilled nursing facility  Plan of Care Reviewed With: patient  Progress: improving  Outcome Summary: Pt performed sit to stand with MaxAx2 and B UE support. Unable to progress to gait training this date due to poor posture and pain. Pt demo better participation with ther ex. Will continue to progress mobility as able  Outcome Measures     Row Name 01/08/19 1410 01/07/19 0840          How much help from another person do you currently need...    Turning from your back to your side while in flat bed without using bedrails?  2  -MJ  2  -MJ     Moving from lying on back to sitting on the side of a flat bed without bedrails?  2  -MJ  2  -MJ     Moving to and from a bed to a chair (including a wheelchair)?  2  -MJ  2  -MJ     Standing up from a chair using your arms (e.g., wheelchair, bedside chair)?  2  -MJ  2  -MJ     Climbing 3-5 steps with a railing?  1  -MJ  1  -MJ     To walk in hospital room?  1  -MJ  1  -MJ     AM-PAC 6 Clicks Score  10  -MJ  10  -MJ        Functional Assessment    Outcome Measure Options  AM-PAC 6 Clicks Basic Mobility (PT)  -MJ  AM-PAC 6 Clicks Basic Mobility (PT)  -MJ       User Key  (r) = Recorded By, (t) = Taken By, (c) = Cosigned By    Initials Name Provider Type    Forrest Singh, PT Physical Therapist         Time Calculation:   PT Charges     Row Name 01/08/19 1410             Time Calculation    Start Time  1410  -MJ      PT Received On  01/08/19  -MJ      PT Goal Re-Cert Due Date  01/17/19  -MJ         Time Calculation- PT    Total Timed Code Minutes- PT  15 minute(s)  -MJ         Timed Charges    37774 - PT Therapeutic Exercise Minutes  10  -MJ      93223 - PT Therapeutic Activity Minutes  5  -MJ        User Key  (r) = Recorded By, (t) = Taken By, (c) = Cosigned By    Initials Name Provider Type    Forrest Singh PT Physical  Therapist        Therapy Suggested Charges     Code   Minutes Charges    03809 (CPT®) Hc Pt Neuromusc Re Education Ea 15 Min      97549 (CPT®) Hc Pt Ther Proc Ea 15 Min 10 1    78812 (CPT®) Hc Gait Training Ea 15 Min      49278 (CPT®) Hc Pt Therapeutic Act Ea 15 Min 5     14156 (CPT®) Hc Pt Manual Therapy Ea 15 Min      86896 (CPT®) Hc Pt Iontophoresis Ea 15 Min      18377 (CPT®) Hc Pt Elec Stim Ea-Per 15 Min      50604 (CPT®) Hc Pt Ultrasound Ea 15 Min      79342 (CPT®) Hc Pt Self Care/Mgmt/Train Ea 15 Min      91853 (CPT®) Hc Pt Prosthetic (S) Train Initial Encounter, Each 15 Min      85801 (CPT®) Hc Pt Orthotic(S)/Prosthetic(S) Encounter, Each 15 Min      19904 (CPT®) Hc Orthotic(S) Mgmt/Train Initial Encounter, Each 15min      Total  15 1        Therapy Charges for Today     Code Description Service Date Service Provider Modifiers Qty    46113681898 HC PT EVAL MOD COMPLEXITY 4 1/7/2019 Forrest Doe, PT GP 1    71575128183 HC PT THER SUPP EA 15 MIN 1/7/2019 Forrest Doe, PT GP 2    27613495213 HC PT THER PROC EA 15 MIN 1/8/2019 Forrest Doe, PT GP 1    65422532088 HC PT THER SUPP EA 15 MIN 1/8/2019 Forrest Doe, PT GP 1          PT G-Codes  Outcome Measure Options: AM-PAC 6 Clicks Basic Mobility (PT)  AM-PAC 6 Clicks Score: 10    Forrest Doe PT  1/8/2019

## 2019-01-08 NOTE — PROGRESS NOTES
[facility]  PROGRESS NOTE    Patient Name: Val Hill  : 1930  MRN: [id number]    Date of Admission: 2019  Length of Stay: 2  Primary Care Physician: Provider, No Known    Subjective   Subjective     CC:  F/u left hip fracture    HPI:  Resting in bed in no acute distress but she is drowsy this morning.  Denies any pain except when she moves the right lower extremity.    Review of Systems  Unable to obtain secondary to dementia.    Objective   Objective     Vital Signs:   Temp:  [97.6 °F (36.4 °C)-98.4 °F (36.9 °C)] 97.6 °F (36.4 °C)  Heart Rate:  [64-87] 76  Resp:  [16-17] 17  BP: (112-179)/(44-86) 179/84        Physical Exam:  Constitutional: No acute distress, sleeping but arouses  HENT: NCAT, mucous membranes moist  Respiratory: Clear to auscultation bilaterally, respiratory effort normal   Cardiovascular: RRR, no murmurs, rubs, or gallops  Gastrointestinal: Positive bowel sounds, soft, nontender, nondistended  Musculoskeletal: No bilateral ankle edema, pain with passive motion of left leg.  Cast to left wrist.  Psychiatric: Appropriate affect, cooperative  Neurologic: Oriented x 1 only, speech with some mild dysarthria  Skin: No rashes      Results Reviewed:  I have personally reviewed current lab, radiology, and data and agree.    Results from last 7 days   Lab Units  19   1235  19   WBC 10*3/mm3   --   8.59  13.59*  14.11*   HEMOGLOBIN g/dL  7.7*  7.5*  10.9*  12.2   HEMATOCRIT %  23.8*  23.1*  33.8*  37.8   PLATELETS 10*3/mm3   --   260  281  330   INR    --    --   1.10*   --      Results from last 7 days   Lab Units  19   0625  19   02519   SODIUM mmol/L  136  138   --   137   POTASSIUM mmol/L  3.2*  3.5   --   3.9   CHLORIDE mmol/L  100  105   --   102   CO2 mmol/L  30.0  27.0   --   29.0   BUN mg/dL  9  8*   --   10   CREATININE mg/dL  0.50*   0.59*   --   0.66   GLUCOSE mg/dL  100  182*   --   186*   CALCIUM mg/dL  8.9  8.2*   --   9.5   ALT (SGPT) U/L  19   --    --   31   AST (SGOT) U/L  21   --    --   23   TROPONIN I ng/mL   --    --   0.00   --      Estimated Creatinine Clearance: 35.1 mL/min (A) (by C-G formula based on SCr of 0.5 mg/dL (L)).    BNP   Date Value Ref Range Status   01/05/2019 24.0 0.0 - 100.0 pg/mL Final     Comment:     Results may be falsely decreased if patient taking Biotin.       Microbiology Results Abnormal     None          Imaging Results (last 24 hours)     Procedure Component Value Units Date/Time    CT Lower Extremity Left Without Contrast [[id number]] Collected:  01/08/19 1546     Updated:  01/08/19 1549    Narrative:       EXAMINATION: CT LOWER EXTREMITY, LEFT, WO CONTRAST-01/08/2019:      INDICATION: Injury of muscle, fascia and tendon at hip and thigh level.     TECHNIQUE:  Axial CT data of the left lower extremity (femur) were  acquired helically without contrast. Multiplanar reformatted images were  generated and reviewed.      The radiation dose reduction device was turned on for each scan per the  ALARA (As Low as Reasonably Achievable) protocol     COMPARISONS:  Radiograph 01/05/2019.     FINDINGS:  Interval operative fixation of the intertrochanteric left  femur fracture. Expected position of hardware and bones identified.  There is expected soft tissue emphysema and swelling. Small soft tissue  hematoma is seen deep to the skin closure staples. Colonic  diverticulosis incidentally noted.       Impression:       Postop ORIF left hip. Small hematoma deep to the inferior  margin of the skin closure staples.     D:  01/08/2019  E:  01/08/2019           This report was finalized on 1/8/2019 3:47 PM by Vin Johnston.             Results for orders placed during the hospital encounter of 11/16/18   Adult Transthoracic Echo Complete W/ Cont if Necessary Per Protocol (With Agitated Saline)    Narrative · Left ventricular  systolic function is normal. Estimated EF = 60%.  · Left ventricular diastolic dysfunction (grade I) consistent with   impaired relaxation.  · Mild mitral valve regurgitation is present  · Mild tricuspid valve regurgitation is present.  · Calculated right ventricular systolic pressure from tricuspid   regurgitation is 32 mmHg.  · Negative saline bubble study.          I have reviewed the medications:    Current Facility-Administered Medications:   •  acetaminophen (TYLENOL) tablet 650 mg, 650 mg, Oral, Q4H PRN, Jenny Groves APRN, 650 mg at 01/05/19 2340  •  artificial tears (LUBRIFRESH P.M.) ophthalmic ointment, , Topical, Q1H PRN, Salome Velazquez, APRN  •  atorvastatin (LIPITOR) tablet 40 mg, 40 mg, Oral, Nightly, Jenny rGoves APRN, 40 mg at 01/07/19 2038  •  busPIRone (BUSPAR) tablet 5 mg, 5 mg, Oral, TID, Stefan Chirinos MD, 5 mg at 01/08/19 1608  •  dextrose (D50W) 25 g/ 50mL Intravenous Solution 25 g, 25 g, Intravenous, Q15 Min PRN, Jenny Groves, APRN  •  dextrose (GLUTOSE) oral gel 15 g, 15 g, Oral, Q15 Min PRN, Jenny Groves, APRN  •  docusate sodium (COLACE) capsule 100 mg, 100 mg, Oral, Daily, Jenny Groves, APRN, 100 mg at 01/08/19 0934  •  donepezil (ARICEPT) tablet 20 mg, 20 mg, Oral, Nightly, Jenny Groves, APRN, 20 mg at 01/07/19 2037  •  FLUoxetine (PROzac) capsule 20 mg, 20 mg, Oral, Daily, Jenny Groves, APRN, 20 mg at 01/08/19 0923  •  glucagon (human recombinant) (GLUCAGEN DIAGNOSTIC) injection 1 mg, 1 mg, Subcutaneous, PRN, Jenyn Groves, APRN  •  HYDROcodone-acetaminophen (NORCO) 7.5-325 MG per tablet 1 tablet, 1 tablet, Oral, Q4H PRN, Hola Yanez Jr., MD, 1 tablet at 01/08/19 1622  •  insulin lispro (humaLOG) injection 0-7 Units, 0-7 Units, Subcutaneous, 4x Daily With Meals & Nightly, Jenny Groves, APRN, 2 Units at 01/07/19 1229  •  LORazepam (ATIVAN) injection 0.25 mg, 0.25 mg, Intravenous, Q6H PRN, Stefan Chirinos MD,  0.25 mg at 01/06/19 1624  •  Magnesium Sulfate 2 gram / 50mL Infusion (GIVE X 3 BAGS TO EQUAL 6GM TOTAL DOSE) - Mg 1.1 - 1.5 mg/dl, 2 g, Intravenous, Once, Leon Ma MD, Last Rate: 25 mL/hr at 01/08/19 1603, 2 g at 01/08/19 1603  •  Morphine sulfate (PF) injection 2 mg, 2 mg, Intravenous, Q4H PRN, 2 mg at 01/07/19 0730 **AND** naloxone (NARCAN) injection 0.4 mg, 0.4 mg, Intravenous, Q5 Min PRN, Stefan Chirinos MD  •  ondansetron (ZOFRAN) tablet 4 mg, 4 mg, Oral, Q6H PRN **OR** ondansetron (ZOFRAN) injection 4 mg, 4 mg, Intravenous, Q6H PRN, Jenny Groves APRN  •  potassium chloride (MICRO-K) CR capsule 40 mEq, 40 mEq, Oral, PRN, 40 mEq at 01/08/19 1603 **OR** potassium chloride (KLOR-CON) packet 40 mEq, 40 mEq, Oral, PRN **OR** potassium chloride 10 mEq in 100 mL IVPB, 10 mEq, Intravenous, Q1H PRN, Leon Ma MD  •  potassium chloride (MICRO-K) CR capsule 10 mEq, 10 mEq, Oral, Daily, Jenny Groves APRN, 10 mEq at 01/08/19 0924  •  QUEtiapine (SEROquel) tablet 25 mg, 25 mg, Oral, Nightly, Jenny Groves, APRN, 25 mg at 01/07/19 2046  •  sertraline (ZOLOFT) tablet 25 mg, 25 mg, Oral, Daily, Stefan Chirinos MD, 25 mg at 01/08/19 0931  •  [COMPLETED] Insert peripheral IV, , , Once **AND** sodium chloride 0.9 % flush 10 mL, 10 mL, Intravenous, PRN, Susan Cotter APRN  •  sodium chloride 0.9 % flush 3 mL, 3 mL, Intravenous, Q12H, Jenny Groves APRN, 3 mL at 01/07/19 1229  •  sodium chloride 0.9 % flush 3-10 mL, 3-10 mL, Intravenous, PRN, Jenny Grvoes APRN  •  Sodium chloride 0.9 % infusion, 75 mL/hr, Intravenous, Continuous, Dossett, Stefan PADILLA MD, Stopped at 01/07/19 1400  •  sucralfate (CARAFATE) tablet 1 g, 1 g, Oral, 4x Daily AC & at Bedtime, Jenny Groves APRN, 1 g at 01/08/19 1222  •  vitamin B-12 (CYANOCOBALAMIN) tablet 1,000 mcg, 1,000 mcg, Oral, Daily, Jenny Groves APRN, 1,000 mcg at 01/08/19 0923      Assessment/Plan   Assessment / Plan      Active Hospital Problems    Diagnosis Date Noted   • **Closed fracture of left femur (CMS/[facility]) [S72.92XA] 01/05/2019   • Chronic diastolic CHF (congestive heart failure) (CMS/[facility]) [I50.32] 01/06/2019   • Gallstones [K80.20] 01/05/2019   • Dementia [F03.90] 11/17/2018   • Type 2 diabetes mellitus (CMS/[facility]) [E11.9] 11/17/2018          Brief Hospital Course to date:  Val Hill is a 88 y.o. female with history of dementia, CVA in 11/2018, DM2 who presents with unwitnessed fall and found to have left femur fracture.  Also recent fall with left wrist fracture that was casted by Dr. Erwin.      - left femur fracture, S/P surgery on 1/6/19.    - Second dropping hemoglobin and hematocrit.  Patient does not have any hematochezia or melena or hematemesis.    - DM    - advanced dementia.    - patient is DNR.  Expect further decline with this event.  +/- palliative eval depending on family wishes and how she does.     PLAN:  - Type and cross 2 units of packed red blood cell.  - We'll transfuse if hemoglobin is less than 7.  - CT scan of the thigh make sure there is no significant bleeding.  - Will stop any anticoagulation or antiplatelets for now  - Monitor hemoglobin and hematocrit every 6 hours  - Labs in a.m.      DVT Prophylaxis:  Mechanical in anticipation possible surgery    Disposition: I expect the patient to be discharged to SNF when medically ready..    CODE STATUS:   Code Status and Medical Interventions:   Ordered at: 01/05/19 3187     Limited Support to NOT Include:    Dialysis    Intubation    Artificial Nutrition     Level Of Support Discussed With:    Next of Kin (If No Surrogate)     Code Status:    No CPR     Medical Interventions (Level of Support Prior to Arrest):    Limited         Electronically signed by Leon Ma MD, 01/08/19, 4:39 PM.

## 2019-01-08 NOTE — PROGRESS NOTES
Orthopedic Daily Progress Note      CC: JAY overnight.    Pain well controlled  General: no fevers, chills  Abdomen: no nausea, vomiting, or diarrhea    No other complaints    Physical Exam:  I have reviewed the vital signs.  Temp:  [97.3 °F (36.3 °C)-98.4 °F (36.9 °C)] 98.1 °F (36.7 °C)  Heart Rate:  [64-87] 87  Resp:  [16-17] 17  BP: (112-167)/(44-86) 165/86    Objective  General Appearance:    Alert, cooperative, no distress  Extremities: No clubbing, cyanosis, or edema to lower extremities  Pulses:  2+ in distal surgical extremity  Skin: Dressing Clean/dry/intact      Results Review:    I have reviewed the labs, radiology results and diagnostic studies:Hgb 7.5 from 10.9 preop    Results from last 7 days   Lab Units  01/08/19   0625   WBC 10*3/mm3  8.59   HEMOGLOBIN g/dL  7.5*   PLATELETS 10*3/mm3  260     Results from last 7 days   Lab Units  01/08/19   0625   SODIUM mmol/L  136   POTASSIUM mmol/L  3.2*   CO2 mmol/L  30.0   CREATININE mg/dL  0.50*   GLUCOSE mg/dL  100       I have reviewed the medications.    Assessment/Problem List  POD# 2 Days Post-Op   S/p L TFN  Post op blood loss anemia, asymptomatic    Plan  WBAT LLE  PT/OT  Ok with DVT ppx with ASA daily and mechanical, as the risks of lovenox likely outweigh the benefits given her medical status.      Discharge Planning: I expect patient to be discharged to rehab in TBD days.    Hola Yanez Jr., MD  01/08/19  11:08 AM

## 2019-01-08 NOTE — PROGRESS NOTES
Malnutrition Severity Assessment    Patient Name:  Val Hill  YOB: 1930  MRN: [id number]  Admit Date:  1/5/2019    Patient meets criteria for : Severe malnutrition(Patient currently meets criteria for Severe, Chronic Malnutrition based on severe muscle wasting and fat loss observed by RD during nutrition focused physical exam.)    Malnutrition Type: Chronic Illness Malnutrition     Malnutrition Type (last 8 hours)      Malnutrition Severity Assessment     Row Name 01/08/19 1142       Malnutrition Severity Assessment    Malnutrition Type  Chronic Illness Malnutrition    Row Name 01/08/19 1142       Physical Signs of Malnutrition (Chronic)    Muscle Wasting  Severe severe muscle wasting evident at temporalis/clavicle/deltoid/qudriceps regions    Fat Loss  Severe severe fat loss evident at orbital region    Row Name 01/08/19 1142       Weight Status (Chronic)    BMI  Severe (<16) BMI = 15.8    %IBW  Mod <80% 74% IBW    %UBW  -- N/A    Weight Loss  -- RD unable to determine weight loss status    Row Name 01/08/19 1142       Criteria Met (Must meet criteria for severity in at least 2 of these categories: M Wasting, Fat Loss, Fluid, Secondary Signs, Wt. Status, Intake)    Patient meets criteria for   Severe malnutrition Patient currently meets criteria for Severe, Chronic Malnutrition based on severe muscle wasting and fat loss observed by RD during nutrition focused physical exam.          Electronically signed by:  Vicky Sandoval RD  01/08/19 11:49 AM

## 2019-01-08 NOTE — PLAN OF CARE
Problem: Patient Care Overview  Goal: Plan of Care Review  Outcome: Ongoing (interventions implemented as appropriate)   01/08/19 2242   Coping/Psychosocial   Plan of Care Reviewed With patient   Plan of Care Review   Progress improving   OTHER   Outcome Summary OT eval complete. Pt limited by pain and confusion. Pt requries maxAx2 for bed mobility, transfers, and LBD. Recommend d/c to SNF rehab.

## 2019-01-08 NOTE — PLAN OF CARE
Problem: Patient Care Overview  Goal: Plan of Care Review  Outcome: Ongoing (interventions implemented as appropriate)   01/07/19 2100   Coping/Psychosocial   Plan of Care Reviewed With patient   Plan of Care Review   Progress no change   OTHER   Outcome Summary Pain well controlled with lortab, Pt back to baseline mentally, oriented only to person, up with PT, only able to pivot to chair and back to bed. Incontinent of urine. Became restless after lunch, pulled IV out, but settled down once moved back to bed. Diet order changed to nectar thick liquids d/t previous stroke and patient being sent home on nectar thick liquids per son/POA.        Problem: Fall Risk (Adult)  Goal: Identify Related Risk Factors and Signs and Symptoms  Outcome: Ongoing (interventions implemented as appropriate)   01/07/19 2100   Fall Risk (Adult)   Related Risk Factors (Fall Risk) gait/mobility problems;fatigue/slow reaction;confusion/agitation;history of falls;neuro disease/injury;polypharmacy;sleep pattern alteration;environment unfamiliar   Signs and Symptoms (Fall Risk) presence of risk factors       Problem: Skin Injury Risk (Adult)  Goal: Identify Related Risk Factors and Signs and Symptoms  Outcome: Ongoing (interventions implemented as appropriate)   01/07/19 2100   Skin Injury Risk (Adult)   Related Risk Factors (Skin Injury Risk) hospitalization prolonged;cognitive impairment;medical devices;mobility impaired;moisture;nutritional deficiencies

## 2019-01-08 NOTE — PROGRESS NOTES
"                  Clinical Nutrition     Nutrition Assessment  Reason for Visit:   Follow-up protocol      Patient Name: Val Hill  YOB: 1930  MRN: [id number]  Date of Encounter: 01/08/19 11:31 AM  Admission date: 1/5/2019    Patient currently meets criteria for Severe, Chronic Malnutrition. See MSA note for details. MD may cosign if in agreement.    Ordered chocolate Boost pudding daily with lunch    Ordered NTL chocolate Boost Glucose Control daily with dinner      Nutrition Assessment   Assessment       Hospital Problem List    Closed fracture of left femur (CMS/HCC)    Type 2 diabetes mellitus (CMS/[facility])    Dementia    Gallstones    Chronic diastolic CHF (congestive heart failure) (CMS/[facility])    Applicable procedures/tests during this admission:  (1/6) s/p left hip trochanteric nailing with intramedullary hip screw      Applicable PMH:  -HLD  -HTN  -Hx of CVA (11/2018)      Reported/Observed/Food/Nutrition Related History:   Pt unable to provide accurate information/hx 2/2 dementia. Asking RD to take her shoe off during visit. Informed pt she does not have any shoes on. RD inquired about patient's flavor preferences, pt prefers chocolate.    RD called and spoke with RN at NH that patient transferred from on admission ([facility]). RN states that patient had been with them x 2 weeks and came in at a weight of 115 lbs. Ate poorly at meals during those 2 weeks. Regular textures, nectar thick liquids. No oral nutrition supplements ordered with meals.      Anthropometrics     Height: 170.2 cm (67\")  Last filed wt: Weight: 45.7 kg (100 lb 12 oz) (01/05/19 1830) - no method specified       BMI: BMI (Calculated): 15.8  Underweight:<18.5kg/m2    Ideal Body Weight (IBW) (kg): 61.86  Admission wt: 100 lb 12 oz  Method obtained: unknown method (1/5)    Unable to determine patient's UBW    Weight change: RD notes patient weighed 115 lbs at MD office visit 12/27 per EMR. RD cannot determine accurate " weight loss status at this point given current admission wt method not specified and patient unable to stand. RD obtained bedscale weight at visit, but inaccurately read 140 lbs.      Nutrition Focused Physical Exam     Subcutaneous fat:  Orbital Severe   Buccal Unable to determine at this time   Tricep Unable to determine at this time   Ribs- thoracic and lumbar region, lower back, midaxillary line Unable to determine at this time     Muscle:  Temple/temporalis Severe   Clavicle/acromion- pectoralis, deltoid Severe   Shoulder- deltoid Severe   Scapula- trapezius, latissimus dorsi Unable to determine at this time   Interosseous- dorsal hand Unable to determine at this time   Thigh- quadriceps Severe   Calf- gastrocnemius Unable to determine at this time       Labs reviewed   Reviewed: Yes    Medications reviewed   Pertinent: Lipitor, colace, prozac, insulin, KCl, VIT B12, IVF    Current Nutrition Prescription     PO: Diet Regular; Nectar / Syrup Thick; Cardiac, Consistent Carbohydrate  No active supplement orders    Intake: 17% / 3 meals      Nutrition Diagnosis     1/6  Problem Underweight   Etiology dementia   Signs/Symptoms BMI=15.78   Status: ongoing    1/8  Problem Inadequate oral intake   Etiology Clinical condition   Signs/Symptoms PO intake: 17% / 3 meals; RN from patient's NH reports patient has had poor PO intake x 2 weeks prior to admission to [facility]       1/8  Problem Malnutrition (severe, chronic)   Etiology Clinical condition   Signs/Symptoms Severe muscle wasting (temporalis, clavicle, deltoid, quadriceps); severe fat loss (orbital)       Nutrition Intervention   1.  Follow treatment progress, Care plan reviewed, Interview for preferences, Encourage intake, Supplement provided     2. Ordered chocolate Boost pudding daily with lunch    3. Ordered NTL chocolate Boost Glucose Control daily with dinner      Goal:   General: Nutrition support treatment  PO: Advance diet as medically  appropriate    Monitoring/Evaluation:   Per protocol, PO intake, Supplement intake, Weight    Will Continue to follow per protocol  Vicky Sandoval RD  Time Spent: 60 min

## 2019-01-08 NOTE — PLAN OF CARE
Problem: Patient Care Overview  Goal: Plan of Care Review  Outcome: Ongoing (interventions implemented as appropriate)   01/08/19 1410   Coping/Psychosocial   Plan of Care Reviewed With patient   Plan of Care Review   Progress improving   OTHER   Outcome Summary Pt performed sit to stand with MaxAx2 and B UE support. Unable to progress to gait training this date due to poor posture and pain. Pt demo better participation with ther ex. Will continue to progress mobility as able

## 2019-01-08 NOTE — PROGRESS NOTES
Continued Stay Note  [facility]     Patient Name: Val Hill  MRN: [id number]  Today's Date: 1/8/2019    Admit Date: 1/5/2019    Discharge Plan     Row Name 01/08/19 1214       Plan    Plan  [facility]    Patient/Family in Agreement with Plan  yes    Plan Comments  Ms. Hill has a skilled bed at [facility] tomorrow, Wed., if medically ready.  [facility] ambulance tentatively scheduled for Wed., 1/9/19 at 2pm.   Patient and family are aware of transfer status.    CM will follow up.    Final Discharge Disposition Code  03 - skilled nursing facility (SNF)        Discharge Codes    No documentation.       Expected Discharge Date and Time     Expected Discharge Date Expected Discharge Time    Dylan 10, 2019             Ernestine Dennis

## 2019-01-08 NOTE — THERAPY EVALUATION
Acute Care - Occupational Therapy Initial Evaluation  [facility]     Patient Name: Val Hill  : 1930  MRN: [id number]  Today's Date: 2019  Onset of Illness/Injury or Date of Surgery: 18  Date of Referral to OT: 18  Referring Physician: MD Beau     Admit Date: 2019       ICD-10-CM ICD-9-CM   1. Closed fracture of left femur, unspecified fracture morphology, unspecified portion of femur, initial encounter (CMS/[facility]) S72.92XA 821.00   2. Dementia without behavioral disturbance, unspecified dementia type F03.90 294.20   3. History of CVA (cerebrovascular accident) Z86.73 V12.54   4. History of diabetes mellitus, type II Z86.39 V12.29   5. Impaired functional mobility, balance, gait, and endurance Z74.09 V49.89   6. Impaired mobility and ADLs Z74.09 799.89     Patient Active Problem List   Diagnosis   • Stroke (CMS/[facility])   • UTI (urinary tract infection)   • Metabolic encephalopathy   • Type 2 diabetes mellitus (CMS/[facility])   • Dementia   • Closed fracture of left femur (CMS/[facility])   • Gallstones   • Chronic diastolic CHF (congestive heart failure) (CMS/[facility])     Past Medical History:   Diagnosis Date   • Dementia    • Diabetes mellitus (CMS/[facility])    • Hyperlipidemia    • Hypertension    • Stroke (CMS/[facility])      Past Surgical History:   Procedure Laterality Date   • ELBOW PROCEDURE     • HIP TROCANTERIC NAILING WITH INTRAMEDULLARY HIP SCREW Left 2019    Procedure: HIP TROCANTERIC NAILING WITH INTRAMEDULLARY HIP SCREW LEFT;  Surgeon: Hola Yanez Jr., MD;  Location: [facility];  Service: Orthopedics   • HYSTERECTOMY            OT ASSESSMENT FLOWSHEET (last 72 hours)      Occupational Therapy Evaluation     Row Name 19 1410 19 1315                OT Evaluation Time/Intention    Subjective Information  --  complains of;pain  -HK       Document Type  --  evaluation  -HK       Mode of Treatment  --  individual therapy;occupational therapy  -HK       Patient Effort  --   adequate  -HK       Symptoms Noted During/After Treatment  --  increased pain  -HK       Comment  --  Pt limted by confusion.   -HK          General Information    Patient Profile Reviewed?  --  yes  -HK       Onset of Illness/Injury or Date of Surgery  --  01/05/18  -HK       Referring Physician  --  MD Beau   -HK       Patient Observations  --  alert;cooperative;agree to therapy  -HK       Patient/Family Observations  --  No family at bedside.   -HK       General Observations of Patient  --  Pt received supine in bed with cast to RUE.  -HK       Prior Level of Function  --  -- unknown pt poor historian and no family present   -HK       Equipment Currently Used at Home  --  -- unknown pt poor historian and no family present   -HK       Pertinent History of Current Functional Problem  --  Pt is a 88 YOF who presents to [facility] with complaints of L hip pain after a fall. Pt found to have basicervical femoral neck fx. Pt is POD #2 s/p L hip trochanteric nailing with intramedullary hip screw.    -HK       Existing Precautions/Restrictions  --  fall;other (see comments) dementia/exit alarms   -HK       Risks Reviewed  --  patient:;LOB;nausea/vomiting;change in vital signs;increased discomfort;dizziness;increased drainage;lines disloged  -HK       Benefits Reviewed  --  patient:;improve function;increase independence;increase strength;increase balance;decrease pain;decrease risk of DVT;improve skin integrity;increase knowledge  -HK       Barriers to Rehab  --  previous functional deficit;cognitive status  -HK          Relationship/Environment    Lives With  --  facility resident  -HK          Resource/Environmental Concerns    Current Living Arrangements  --  independent/assisted living facility  -HK          Cognitive Assessment/Interventions    Additional Documentation  --  Cognitive Assessment/Intervention (Group)  -HK          Cognitive Assessment/Intervention- PT/OT    Affect/Mental Status (Cognitive)  --   confused;agitated  -       Orientation Status (Cognition)  --  oriented to;person;disoriented to;place;situation;time  -HK       Follows Commands (Cognition)  --  follows one step commands;50-74% accuracy  -HK       Cognitive Function (Cognitive)  --  safety deficit;memory deficit;executive function deficit  -HK       Executive Function Deficit (Cognition)  --  moderate deficit;information processing;impulse control;abstract thinking;insight/awareness of deficits;judgment;organization/sequencing;planning/decision making;problem solving/reasoning;self-monitoring/self-correction  -HK       Memory Deficit (Cognitive)  --  severe deficit;short term memory;long term memory  -HK       Safety Deficit (Cognitive)  --  severe deficit  -HK       Personal Safety Interventions  --  fall prevention program maintained;gait belt;nonskid shoes/slippers when out of bed  -HK          Safety Issues, Functional Mobility    Safety Issues Affecting Function (Mobility)  --  safety precaution awareness;safety precautions follow-through/compliance;sequencing abilities;awareness of need for assistance;at risk behavior observed;ability to follow commands  -HK       Impairments Affecting Function (Mobility)  --  balance;pain;strength;cognition;coordination;motor planning  -          Mobility Assessment/Treatment    Extremity Weight-bearing Status  --  left lower extremity  -HK       Left Lower Extremity (Weight-bearing Status)  --  weight-bearing as tolerated (WBAT)  -          Bed Mobility Assessment/Treatment    Bed Mobility Assessment/Treatment  --  supine-sit;scooting/bridging  -       Scooting/Bridging [place] (Bed Mobility)  --  maximum assist (25% patient effort);2 person assist;verbal cues  -       Supine-Sit [place] (Bed Mobility)  --  maximum assist (25% patient effort);2 person assist;verbal cues  -       Bed Mobility, Safety Issues  --  decreased use of arms for pushing/pulling;decreased use of legs for  bridging/pushing;impaired trunk control for bed mobility;cognitive deficits limit understanding  -HK       Assistive Device (Bed Mobility)  --  head of bed elevated;bed rails  -HK       Comment (Bed Mobility)  --  Pt requires maxA for all bed mobility.   -HK          Functional Mobility    Functional Mobility- Ind. Level  --  not appropriate to assess  -HK          Transfer Assessment/Treatment    Transfer Assessment/Treatment  --  stand pivot/stand step transfer;toilet transfer  -HK       Comment (Transfers)  --  Pt completed stand pivot transfer to BSC and stand pivot transfer from BSC to chair.  -HK          Stand Pivot/Stand Step Transfer    Stand Pivot/Stand Step [place]  --  maximum assist (25% patient effort);2 person assist;verbal cues  -HK       Assistive Device (Stand Pivot Stand Step Transfer)  --  -- gait belt and UE support   -HK          Toilet Transfer    Type (Toilet Transfer)  --  sit-stand;stand-sit  -HK       [place] Level (Toilet Transfer)  --  maximum assist (25% patient effort);2 person assist;verbal cues  -HK       Assistive Device (Toilet Transfer)  --  commode, bedside without drop arms  -          ADL Assessment/Intervention    BADL Assessment/Intervention  --  lower body dressing  -HK          Lower Body Dressing Assessment/Training    Lower Body Dressing [place] Level  --  maximum assist (25% patient effort);don;socks  -       Lower Body Dressing Position  --  supported sitting  -          BADL Safety/Performance    Impairments, BADL Safety/Performance  --  balance;cognition;endurance/activity tolerance;coordination;motor control;motor planning;pain;strength;range of motion  -HK       Cognitive Impairments, BADL Safety/Performance  --  awareness, need for assistance;impulsivity;judgment;insight into deficits/self awareness;safety precaution follow-through;safety precaution awareness;problem solving/reasoning;sequencing abilities  -HK          General ROM    RT Upper  Ext  --  Rt Shoulder Flexion  -HK       LT Upper Ext  --  Lt Shoulder Flexion  -HK          Right Upper Ext    Rt Shoulder Flexion AROM  --  grossly 90 degrees AROM   -HK       Rt Shoulder Flexion PROM  --  grossly 130 degrees PROM  -HK          Left Upper Ext    Lt Shoulder Flexion AROM  --  grossly 90 degrees AROM   -HK       Lt Shoulder Flexion PROM  --  grossly 130 degrees PROM  -HK          MMT (Manual Muscle Testing)    Rt Upper Ext  --  Rt Shoulder Flexion  -HK       Lt Upper Ext  --  Lt Shoulder Flexion  -HK          MMT Right Upper Ext    Rt Shoulder Flexion MMT, Gross Movement  --  (3/5) fair  -HK          MMT Left Upper Ext    Lt Shoulder Flexion MMT, Gross Movement  --  (3/5) fair  -HK          Motor Assessment/Interventions    Additional Documentation  --  Balance (Group)  -HK          Balance    Balance  --  static sitting balance;static standing balance  -HK          Static Sitting Balance    Level of [place] (Unsupported Sitting, Static Balance)  --  moderate assist, 50 to 74% patient effort  -HK       Sitting Position (Unsupported Sitting, Static Balance)  --  sitting on edge of bed  -HK       Time Able to Maintain Position (Unsupported Sitting, Static Balance)  --  2 to 3 minutes  -HK       Comment (Unsupported Sitting, Static Balance)  --  progressed to [place] at times   -HK          Static Standing Balance    [place] (Supported Standing, Static Balance)  --  maximal assist, 25 to 49% patient effort  -HK       Time Able to Maintain Position (Supported Standing, Static Balance)  --  15 to 30 seconds  -HK       Assistive Device Utilized (Supported Standing, Static Balance)  --  -- ue support and gait belt   -HK          Sensory Assessment/Intervention    Sensory General Assessment  --  no sensation deficits identified  -HK          Positioning and Restraints    Pre-Treatment Position  --  in bed  -HK       Post Treatment Position  --  chair  -HK       In Chair  --  notified  nsg;reclined;call light within reach;encouraged to call for assist;exit alarm on  -HK          Pain Assessment    Additional Documentation  --  Pain Scale: FACES Pre/Post-Treatment (Group);Pain Scale 2: FACES Pre/Post-Treatment (Group)  -HK          Pain Scale: Numbers Pre/Post-Treatment    Pain Location - Side  --  Left  -HK       Pain Location - Orientation  --  lower  -HK       Pain Location  --  extremity  -HK       Pain Intervention(s)  --  Ambulation/increased activity;Repositioned  -HK          Pain Scale: FACES Pre/Post-Treatment    Pain: FACES Scale, Pretreatment  --  4-->hurts little more  -HK       Pain: FACES Scale, Post-Treatment  --  4-->hurts little more  -HK          Pain Scale 2: FACES Pre/Post-Treatment    Pain 2: FACES Scale, Pretreatment  --  6-->hurts even more  -HK       Pain 2: FACES Scale, Post-Treatment  --  6-->hurts even more  -HK       Pain Location 2  --  head  -HK          Wound 01/06/19 1407 Left hip incision    Wound - Properties Group Date first assessed: 01/06/19  -TB Time first assessed: 1407  -TB Side: Left  -TB Location: hip  -TB Type: incision  -TB       Plan of Care Review    Plan of Care Reviewed With  --  patient  -HK          Clinical Impression (OT)    Date of Referral to OT  --  01/07/18  -HK       OT Diagnosis  --  -HK  Decreased independence with ADLS and Mobility   -HK       Patient/Family Goals Statement (OT Eval)  --  -HK  Pt unable to make goal statement this date  -HK       Criteria for Skilled Therapeutic Interventions Met (OT Eval)  --  -HK  yes;treatment indicated  -HK       Rehab Potential (OT Eval)  --  -HK  good, to achieve stated therapy goals  -HK       Therapy Frequency (OT Eval)  --  -HK  daily  -HK       Care Plan Review (OT)  --  -HK  evaluation/treatment results reviewed;care plan/treatment goals reviewed;patient/other agree to care plan;risks/benefits reviewed  -HK       Anticipated Discharge Disposition (OT)  --  -HK  skilled nursing facility rehab   -HK          Vital Signs    Pre Systolic BP Rehab  --  -HK  -- RN cleared for tx  -HK       Pre Patient Position  --  -HK  Supine  -HK       Intra Patient Position  --  -HK  Standing  -HK       Post Patient Position  --  -HK  Sitting  -HK          OT Goals    Bed Mobility Goal Selection (OT)  --  -HK  bed mobility, OT goal 1  -HK       Transfer Goal Selection (OT)  --  -HK  transfer, OT goal 1  -HK       Toileting Goal Selection (OT)  --  -HK  toileting, OT goal 1  -HK       Grooming Goal Selection (OT)  --  -HK  grooming, OT goal 1  -HK       Additional Documentation  --  -HK  --          Bed Mobility Goal 1 (OT)    Activity/Assistive Device (Bed Mobility Goal 1, OT)  --  -HK  sit to supine/supine to sit;scooting  -HK       [place] Level/Cues Needed (Bed Mobility Goal 1, OT)  --  -HK  moderate assist (50-74% patient effort)  -HK       Time Frame (Bed Mobility Goal 1, OT)  --  -HK  5 days  -HK       Progress/Outcomes (Bed Mobility Goal 1, OT)  --  -HK  goal ongoing  -HK          Transfer Goal 1 (OT)    Activity/Assistive Device (Transfer Goal 1, OT)  --  -HK  sit-to-stand/stand-to-sit;toilet  -HK       [place] Level/Cues Needed (Transfer Goal 1, OT)  --  -HK  moderate assist (50-74% patient effort);verbal cues required  -HK       Time Frame (Transfer Goal 1, OT)  --  -HK  5 days  -HK       Progress/Outcome (Transfer Goal 1, OT)  --  -HK  goal ongoing  -HK          Toileting Goal 1 (OT)    Activity/Device (Toileting Goal 1, OT)  --  -HK  toileting skills, all  -HK       [place] Level/Cues Needed (Toileting Goal 1, OT)  --  -HK  maximum assist (25-49% patient effort);verbal cues required  -HK       Time Frame (Toileting Goal 1, OT)  --  -HK  5 days  -HK       Progress/Outcome (Toileting Goal 1, OT)  --  -HK  goal ongoing  -HK          Grooming Goal 1 (OT)    Activity/Device (Grooming Goal 1, OT)  --  -HK  hair care;oral care;wash face, hands  -HK       [place] (Grooming Goal 1, OT)  --  -HK   moderate assist (50-74% patient effort);verbal cues required  -       Time Frame (Grooming Goal 1, OT)  --  -  5 days  -       Progress/Outcome (Grooming Goal 1, OT)  --  -  goal ongoing  -         User Key  (r) = Recorded By, (t) = Taken By, (c) = Cosigned By    Initials Name Effective Dates     Kirti Bishop RN 06/16/16 -      Elena Tapia, OT 03/07/18 -          Occupational Therapy Education     Title: PT OT SLP Therapies (In Progress)     Topic: Occupational Therapy (In Progress)     Point: Precautions (In Progress)     Description: Instruct learner(s) on prescribed precautions during self-care and functional transfers.    Learning Progress Summary           Patient Acceptance, E, NR by  at 1/8/2019  2:44 PM    Comment:  Pt educated on role of OT, safety precautions, and appropriate body mechanics.                   Point: Body mechanics (In Progress)     Description: Instruct learner(s) on proper positioning and spine alignment during self-care, functional mobility activities and/or exercises.    Learning Progress Summary           Patient Acceptance, E, NR by  at 1/8/2019  2:44 PM    Comment:  Pt educated on role of OT, safety precautions, and appropriate body mechanics.                               User Key     Initials Effective Dates Name Provider Type Discipline     03/07/18 -  Elena Tapia, OT Occupational Therapist OT                  OT Recommendation and Plan  Outcome Summary/Treatment Plan (OT)  Anticipated Discharge Disposition (OT): skilled nursing facility(rehab)  Therapy Frequency (OT Eval): daily  Plan of Care Review  Plan of Care Reviewed With: patient  Plan of Care Reviewed With: patient  Outcome Summary: OT eval complete. Pt limited by pain and confusion. Pt requries maxAx2 for bed mobility, transfers, and LBD. Recommend d/c to SNF rehab.     Outcome Measures     Row Name 01/08/19 1410 01/08/19 1315 01/07/19 0840       How much help from another person do you  currently need...    Turning from your back to your side while in flat bed without using bedrails?  2  -MJ  --  2  -MJ    Moving from lying on back to sitting on the side of a flat bed without bedrails?  2  -MJ  --  2  -MJ    Moving to and from a bed to a chair (including a wheelchair)?  2  -MJ  --  2  -MJ    Standing up from a chair using your arms (e.g., wheelchair, bedside chair)?  2  -MJ  --  2  -MJ    Climbing 3-5 steps with a railing?  1  -MJ  --  1  -MJ    To walk in hospital room?  1  -MJ  --  1  -MJ    AM-PAC 6 Clicks Score  10  -MJ  --  10  -MJ       How much help from another is currently needed...    Putting on and taking off regular lower body clothing?  --  1  -HK  --    Bathing (including washing, rinsing, and drying)  --  1  -HK  --    Toileting (which includes using toilet bed pan or urinal)  --  1  -HK  --    Putting on and taking off regular upper body clothing  --  2  -HK  --    Taking care of personal grooming (such as brushing teeth)  --  2  -HK  --    Eating meals  --  2  -HK  --    Score  --  9  -HK  --       Functional Assessment    Outcome Measure Options  AM-PAC 6 Clicks Basic Mobility (PT)  -  AM-PAC 6 Clicks Daily Activity (OT)  -  AM-PAC 6 Clicks Basic Mobility (PT)  -      User Key  (r) = Recorded By, (t) = Taken By, (c) = Cosigned By    Initials Name Provider Type     Forrest Doe, PT Physical Therapist    Elena Stock OT Occupational Therapist          Time Calculation:   Time Calculation- OT     Row Name 01/08/19 1315             Time Calculation- OT    OT Start Time  1315  -      OT Received On  01/08/19  -      OT Goal Re-Cert Due Date  01/18/19  -        User Key  (r) = Recorded By, (t) = Taken By, (c) = Cosigned By    Initials Name Provider Type    Elena Stock, OT Occupational Therapist        Therapy Suggested Charges     Code   Minutes Charges    None           Therapy Charges for Today     Code Description Service Date Service Provider Modifiers Qty     97533899980 HC OT EVAL LOW COMPLEXITY 4 1/8/2019 Elena Tapia, OT GO 1    54106408002 HC OT THER SUPP EA 15 MIN 1/8/2019 Elena Tapia, OT GO 2               Elena Tapia OT  1/8/2019

## 2019-01-09 NOTE — PLAN OF CARE
"Problem: Patient Care Overview  Goal: Plan of Care Review  Outcome: Ongoing (interventions implemented as appropriate)   01/08/19 2145 01/09/19 0327   Coping/Psychosocial   Plan of Care Reviewed With patient --    Plan of Care Review   Progress --  no change   OTHER   Outcome Summary --  Patient rested well this shift. Confused. Yells into hallway for \"help\" but is relatively easy to redirect. No significant changes this shift. Magnesium replacement         "

## 2019-01-09 NOTE — THERAPY TREATMENT NOTE
Acute Care - Physical Therapy Treatment Note  [facility]     Patient Name: Val Hill  : 1930  MRN: [id number]  Today's Date: 2019  Onset of Illness/Injury or Date of Surgery: 18  Date of Referral to PT: 19  Referring Physician: MD Beau     Admit Date: 2019    Visit Dx:    ICD-10-CM ICD-9-CM   1. Closed fracture of left femur, unspecified fracture morphology, unspecified portion of femur, initial encounter (CMS/[facility]) S72.92XA 821.00   2. Dementia without behavioral disturbance, unspecified dementia type F03.90 294.20   3. History of CVA (cerebrovascular accident) Z86.73 V12.54   4. History of diabetes mellitus, type II Z86.39 V12.29   5. Impaired functional mobility, balance, gait, and endurance Z74.09 V49.89   6. Impaired mobility and ADLs Z74.09 799.89     Patient Active Problem List   Diagnosis   • Stroke (CMS/[facility])   • UTI (urinary tract infection)   • Metabolic encephalopathy   • Type 2 diabetes mellitus (CMS/[facility])   • Dementia   • Closed fracture of left femur (CMS/[facility])   • Gallstones   • Chronic diastolic CHF (congestive heart failure) (CMS/[facility])       Therapy Treatment    Rehabilitation Treatment Summary     Row Name 19 1136 19 1031          Treatment Time/Intention    Discipline  physical therapist  -MJ  occupational therapist  -AC     Document Type  therapy note (daily note)  -MJ  therapy note (daily note)  -AC     Subjective Information  complains of;pain  -MJ  complains of;pain  -AC     Mode of Treatment  physical therapy  -MJ  --     Patient/Family Observations  Pt sitting UI  -  No famly present.  Pt received in bed. IV intact  -AC     Patient Effort  adequate  -MJ  adequate  -AC     Existing Precautions/Restrictions  fall;other (see comments) dementia/exit alarm; cast to L UE  -MJ  fall  -AC     Treatment Considerations/Comments  --  confusion.  LUE cast  -AC     Recorded by [MJ] Forrest Doe, PT 19 1200 [AC] Estee Diaz, OT 19 1134      Row Name 01/09/19 1136 01/09/19 1031          Cognitive Assessment/Intervention- PT/OT    Affect/Mental Status (Cognitive)  confused  -MJ  confused  -AC     Orientation Status (Cognition)  oriented to;person  -MJ  oriented to;person  -AC     Follows Commands (Cognition)  follows one step commands;50-74% accuracy;verbal cues/prompting required;repetition of directions required  -MJ  follows one step commands;25-49% accuracy  -AC     Safety Deficit (Cognitive)  severe deficit  -MJ  ability to follow commands;at risk behavior observed;judgment;problem solving;safety precautions awareness;safety precautions follow-through/compliance  -AC     Personal Safety Interventions  --  fall prevention program maintained;gait belt;nonskid shoes/slippers when out of bed  -AC     Recorded by [MJ] Forrest Doe, PT 01/09/19 1200 [AC] Estee Diaz, OT 01/09/19 1134     Row Name 01/09/19 1136 01/09/19 1031          Safety Issues, Functional Mobility    Safety Issues Affecting Function (Mobility)  --  at risk behavior observed;ability to follow commands;insight into deficits/self awareness;judgment;problem solving;safety precaution awareness  -AC     Impairments Affecting Function (Mobility)  balance;cognition;pain;strength  -MJ  balance;cognition;pain;strength  -AC     Recorded by [MJ] Forrest Doe, PT 01/09/19 1200 [AC] Estee Diaz, OT 01/09/19 1134     Row Name 01/09/19 1136 01/09/19 1031          Mobility Assessment/Intervention    Extremity Weight-bearing Status  left lower extremity  -MJ  left lower extremity  -AC     Left Lower Extremity (Weight-bearing Status)  weight-bearing as tolerated (WBAT)  -MJ  weight-bearing as tolerated (WBAT)  -AC     Recorded by [MJ] Forrest Doe, PT 01/09/19 1200 [AC] Estee Diaz, OT 01/09/19 1134     Row Name 01/09/19 1136 01/09/19 1031          Bed Mobility Assessment/Treatment    Bed Mobility Assessment/Treatment  --  supine-sit  -AC     Supine-Sit [place] (Bed Mobility)  --   maximum assist (25% patient effort)  -AC     Bed Mobility, Safety Issues  --  cognitive deficits limit understanding;decreased use of arms for pushing/pulling;decreased use of legs for bridging/pushing  -AC     Assistive Device (Bed Mobility)  --  draw sheet;head of bed elevated  -AC     Comment (Bed Mobility)  NT- pt UIC  -MJ  --     Recorded by [MJ] Forrest Doe, PT 01/09/19 1200 [AC] Estee Diaz, OT 01/09/19 1134     Row Name 01/09/19 1136 01/09/19 1031          Transfer Assessment/Treatment    Transfer Assessment/Treatment  sit-stand transfer;stand-sit transfer  -  bed-chair transfer;sit-stand transfer;stand-sit transfer  -AC     Comment (Transfers)  Verbal cues for correct hand placement on PT and tech's arms. Performed x2 reps, initially unable to achieve fully upright posture. Second stand improved and achieved fully erect posture and able to maintain static standing  -  --     Recorded by [MJ] Forrest Doe, PT 01/09/19 1200 [AC] Estee Diaz, OT 01/09/19 1134     Row Name 01/09/19 1031             Bed-Chair Transfer    Bed-Chair [place] (Transfers)  verbal cues;maximum assist (25% patient effort)  -      Assistive Device (Bed-Chair Transfers)  -- gait belt and UE support  -AC      Recorded by [AC] Estee Diaz, OT 01/09/19 1134      Row Name 01/09/19 1136 01/09/19 1031          Sit-Stand Transfer    Sit-Stand [place] (Transfers)  maximum assist (25% patient effort);2 person assist;verbal cues  -  maximum assist (25% patient effort)  -     Assistive Device (Sit-Stand Transfers)  other (see comments) B UE support, gait belt  -  -- gait belt and UE support  -AC     Recorded by [MJ] Forrest Doe, PT 01/09/19 1200 [AC] Estee Diaz, OT 01/09/19 1134     Row Name 01/09/19 1136 01/09/19 1031          Stand-Sit Transfer    Stand-Sit [place] (Transfers)  maximum assist (25% patient effort);2 person assist;verbal cues  -  maximum assist (25% patient effort)  -     Assistive  Device (Stand-Sit Transfers)  other (see comments) B UE support, gait belt  -MJ  -- gait belt and UE support  -AC     Recorded by [MJ] Forrest Doe, PT 01/09/19 1200 [AC] Estee Diaz, OT 01/09/19 1134     Row Name 01/09/19 1136             Gait/Stairs Assessment/Training    [place] Level (Gait)  unable to assess  -MJ      Comment (Gait/Stairs)  Pt achieved upright posture, but refused to attempt to take steps  -MJ      Recorded by [MJ] Forrest Doe, PT 01/09/19 1200      Row Name 01/09/19 1031             ADL Assessment/Intervention    78468 - OT Self Care/Mgmt Minutes  3  -AC      BADL Assessment/Intervention  grooming  -AC      Recorded by [AC] Estee Diaz, OT 01/09/19 1134      Row Name 01/09/19 1031             Grooming Assessment/Training    [place] Level (Grooming)  hair care, combing/brushing;moderate assist (50% patient effort);wash face, hands;set up  -AC      Grooming Position  supported sitting  -AC      Comment (Grooming)  limited sustained attn to complete combing hair  -AC      Recorded by [AC] Estee Diaz, OT 01/09/19 1134      Row Name 01/09/19 1031             BADL Safety/Performance    Impairments, BADL Safety/Performance  balance;cognition;pain;strength  -AC      Cognitive Impairments, BADL Safety/Performance  awareness, need for assistance;insight into deficits/self awareness;judgment;problem solving/reasoning;safety precaution awareness  -AC      Skilled BADL Treatment/Intervention  BADL process/adaptation training  -AC      Recorded by [AC] Estee Diaz, OT 01/09/19 1134      Row Name 01/09/19 1136 01/09/19 1031          Therapeutic Exercise    46934 - PT Therapeutic Exercise Minutes  7  -MJ  --     84043 - PT Therapeutic Activity Minutes  6  -MJ  --     12338 - OT Therapeutic Exercise Minutes  --  5  -AC     10438 - OT Therapeutic Activity Minutes  --  15  -AC     Recorded by [MJ] Forrest Doe, PT 01/09/19 1200 [AC] Estee Diaz, OT 01/09/19 1134     Row Name 01/09/19  1136 01/09/19 1031          Therapeutic Exercise    Upper Extremity Range of Motion (Therapeutic Exercise)  --  shoulder flexion/extension, bilateral;shoulder horizontal abduction/adduction, bilateral;elbow flexion/extension, bilateral  -AC     Hand (Therapeutic Exercise)  --  finger flexion/extension, bilateral  -AC     Lower Extremity (Therapeutic Exercise)  heel slides, left;LAQ (long arc quad), left;quad sets, left;SLR (straight leg raise), left  -MJ  --     Lower Extremity Range of Motion (Therapeutic Exercise)  hip flexion/extension, left;hip abduction/adduction, left;ankle dorsiflexion/plantar flexion, left  -MJ  --     Exercise Type (Therapeutic Exercise)  AAROM (active assistive range of motion)  -MJ  AAROM (active assistive range of motion)  -AC     Position (Therapeutic Exercise)  seated  -MJ  seated  -AC     Sets/Reps (Therapeutic Exercise)  15x each  -  1/10  -AC     Comment (Therapeutic Exercise)  Cues for technique. [place] w/ SLR, hip abd, hip flx, heel slides  -  Cues for full range and to continue to 10 reps  -AC     Recorded by [MJ] Forrest Doe, PT 01/09/19 1200 [AC] Estee Diaz, OT 01/09/19 1134     Row Name 01/09/19 1136             Static Standing Balance    Level of [place] (Supported Standing, Static Balance)  moderate assist, 50 to 74% patient effort x2  -MJ      Time Able to Maintain Position (Supported Standing, Static Balance)  1 to 2 minutes  -MJ      Assistive Device Utilized (Supported Standing, Static Balance)  other (see comments) B UE support  -      Comment (Supported Standing, Static Balance)  Cues for upright posture and L LE weight bearing  -      Recorded by [MJ] Forrest Doe, PT 01/09/19 1202      Row Name 01/09/19 1136 01/09/19 1031          Positioning and Restraints    Pre-Treatment Position  sitting in chair/recliner  -MJ  in bed  -AC     Post Treatment Position  chair  -MJ  chair  -AC     In Chair  notified nsg;reclined;call light within reach;encouraged to  call for assist;exit alarm on;on mechanical lift sling  -MJ  reclined;call light within reach;encouraged to call for assist;exit alarm on;on mechanical lift sling  -AC     Recorded by [MJ] Forrest Doe, PT 01/09/19 1200 [AC] Estee Diaz, OT 01/09/19 1134     Row Name 01/09/19 1136 01/09/19 1031          Pain Scale: Numbers Pre/Post-Treatment    Pain Location - Side  Left  -MJ  Left  -AC     Pain Location - Orientation  --  lower  -AC     Pain Location  hip  -MJ  extremity  -AC     Pain Intervention(s)  Repositioned;Ambulation/increased activity  -MJ  Repositioned  -AC     Recorded by [MJ] Forrest Doe, PT 01/09/19 1200 [AC] Estee Diaz, OT 01/09/19 1134     Row Name 01/09/19 1136 01/09/19 1031          Pain Scale 2: FACES Pre/Post-Treatment    Pain 2: FACES Scale, Pretreatment  2-->hurts little bit  -MJ  0-->no hurt  -AC     Pain 2: FACES Scale, Post-Treatment  4-->hurts little more  -MJ  4-->hurts little more  -AC     Recorded by [MJ] Forrest Doe, PT 01/09/19 1200 [AC] Estee Diaz, OT 01/09/19 1134     Row Name                Wound 01/06/19 1407 Left hip incision    Wound - Properties Group Date first assessed: 01/06/19 [TB] Time first assessed: 1407 [TB] Side: Left [TB] Location: hip [TB] Type: incision [TB] Recorded by:  [TB] Kirti Bishop RN 01/06/19 1407    Row Name 01/09/19 1136             Plan of Care Review    Plan of Care Reviewed With  patient  -MJ      Recorded by [MJ] Forrest Doe, PT 01/09/19 1200      Row Name 01/09/19 1136             Outcome Summary/Treatment Plan (PT)    Daily Summary of Progress (PT)  progress toward functional goals is gradual  -MJ      Recorded by [MJ] Forrest Doe, PT 01/09/19 1200        User Key  (r) = Recorded By, (t) = Taken By, (c) = Cosigned By    Initials Name Effective Dates Discipline    AC Estee Diaz, OT 06/23/15 -  OT    TB Kirti Bishop RN 06/16/16 -  Nurse    Forrest Singh, PT 04/03/18 -  PT          Wound 01/06/19 6527 Left hip incision  (Active)   Dressing Appearance dry;intact 1/8/2019  9:45 PM   Dressing Care, Wound hydrocolloid 1/8/2019  9:45 PM           Physical Therapy Education     Title: PT OT SLP Therapies (In Progress)     Topic: Physical Therapy (In Progress)     Point: Mobility training (In Progress)     Learning Progress Summary           Patient Acceptance, E,D, NR by  at 1/9/2019 11:36 AM    Comment:  Reviewed HEP, benefits of mobility    Acceptance, E,D, NR by  at 1/8/2019  2:10 PM    Comment:  Reviewed HEP, benefits of mobility    Acceptance, E,D, NR by  at 1/7/2019  8:40 AM    Comment:  Edu re: benefits of mobility, WB status                   Point: Home exercise program (In Progress)     Learning Progress Summary           Patient Acceptance, E,D, NR by  at 1/9/2019 11:36 AM    Comment:  Reviewed HEP, benefits of mobility    Acceptance, E,D, NR by  at 1/8/2019  2:10 PM    Comment:  Reviewed HEP, benefits of mobility                   Point: Body mechanics (In Progress)     Learning Progress Summary           Patient Acceptance, E,D, NR by  at 1/9/2019 11:36 AM    Comment:  Reviewed HEP, benefits of mobility    Acceptance, E,D, NR by  at 1/8/2019  2:10 PM    Comment:  Reviewed HEP, benefits of mobility    Acceptance, E,D, NR by  at 1/7/2019  8:40 AM    Comment:  Edu re: benefits of mobility, WB status                   Point: Precautions (In Progress)     Learning Progress Summary           Patient Acceptance, E,D, NR by  at 1/9/2019 11:36 AM    Comment:  Reviewed HEP, benefits of mobility    Acceptance, E,D, NR by  at 1/8/2019  2:10 PM    Comment:  Reviewed HEP, benefits of mobility    Acceptance, E,D, NR by  at 1/7/2019  8:40 AM    Comment:  Edu re: benefits of mobility, WB status                               User Key     Initials Effective Dates Name Provider Type Discipline     04/03/18 -  Forrest Doe PT Physical Therapist PT                PT Recommendation and Plan  Anticipated Discharge  Disposition (PT): skilled nursing facility  Planned Therapy Interventions (PT Eval): balance training, bed mobility training, gait training, home exercise program, patient/family education, strengthening, transfer training  Therapy Frequency (PT Clinical Impression): daily  Outcome Summary/Treatment Plan (PT)  Daily Summary of Progress (PT): progress toward functional goals is gradual  Anticipated Equipment Needs at Discharge (PT): (none)  Anticipated Discharge Disposition (PT): skilled nursing facility  Plan of Care Reviewed With: patient  Progress: improving  Outcome Summary: Pt performed sit to stand x2 reps with MaxAx2 and B UE support. Pt with improved posture and balance in static standing this date and increased ther ex reps. Will continue to progress mobility as able  Outcome Measures     Row Name 01/09/19 1136 01/09/19 1031 01/08/19 1410       How much help from another person do you currently need...    Turning from your back to your side while in flat bed without using bedrails?  2  -MJ  --  2  -MJ    Moving from lying on back to sitting on the side of a flat bed without bedrails?  2  -MJ  --  2  -MJ    Moving to and from a bed to a chair (including a wheelchair)?  2  -MJ  --  2  -MJ    Standing up from a chair using your arms (e.g., wheelchair, bedside chair)?  2  -MJ  --  2  -MJ    Climbing 3-5 steps with a railing?  1  -MJ  --  1  -MJ    To walk in hospital room?  1  -MJ  --  1  -MJ    AM-PAC 6 Clicks Score  10  -MJ  --  10  -MJ       How much help from another is currently needed...    Putting on and taking off regular lower body clothing?  --  1  -AC  --    Bathing (including washing, rinsing, and drying)  --  1  -AC  --    Toileting (which includes using toilet bed pan or urinal)  --  1  -AC  --    Putting on and taking off regular upper body clothing  --  2  -AC  --    Taking care of personal grooming (such as brushing teeth)  --  2  -AC  --    Eating meals  --  2  -AC  --    Score  --  9  -AC  --        Functional Assessment    Outcome Measure Options  [facility] 6 Clicks Basic Mobility (PT)  -  --  [facility] 6 Clicks Basic Mobility (PT)  -    Row Name 01/08/19 1315 01/07/19 0840          How much help from another person do you currently need...    Turning from your back to your side while in flat bed without using bedrails?  --  2  -MJ     Moving from lying on back to sitting on the side of a flat bed without bedrails?  --  2  -MJ     Moving to and from a bed to a chair (including a wheelchair)?  --  2  -MJ     Standing up from a chair using your arms (e.g., wheelchair, bedside chair)?  --  2  -MJ     Climbing 3-5 steps with a railing?  --  1  -MJ     To walk in hospital room?  --  1  -MJ     AM-[facility] 6 Clicks Score  --  10  -MJ        How much help from another is currently needed...    Putting on and taking off regular lower body clothing?  1  -HK  --     Bathing (including washing, rinsing, and drying)  1  -HK  --     Toileting (which includes using toilet bed pan or urinal)  1  -HK  --     Putting on and taking off regular upper body clothing  2  -HK  --     Taking care of personal grooming (such as brushing teeth)  2  -HK  --     Eating meals  2  -HK  --     Score  9  -HK  --        Functional Assessment    Outcome Measure Options  AM-[facility] 6 Clicks Daily Activity (OT)  -[facility] 6 Clicks Basic Mobility (PT)  -       User Key  (r) = Recorded By, (t) = Taken By, (c) = Cosigned By    Initials Name Provider Type    Estee Chamorro, OT Occupational Therapist    MJ Forrest Doe, PT Physical Therapist    HK Elena Tapia, OT Occupational Therapist         Time Calculation:   PT Charges     Row Name 01/09/19 1136             Time Calculation    Start Time  1136  -MJ      PT Received On  01/09/19  -      PT Goal Re-Cert Due Date  01/17/19  -         Time Calculation- PT    Total Timed Code Minutes- PT  13 minute(s)  -MJ         Timed Charges    52026 - PT Therapeutic Exercise Minutes  7  -MJ      91632 - PT  Therapeutic Activity Minutes  6  -MJ        User Key  (r) = Recorded By, (t) = Taken By, (c) = Cosigned By    Initials Name Provider Type    MJ Forrest Doe, PT Physical Therapist        Therapy Suggested Charges     Code   Minutes Charges    64347 (CPT®) Hc Pt Neuromusc Re Education Ea 15 Min      96429 (CPT®) Hc Pt Ther Proc Ea 15 Min 7 1    48742 (CPT®) Hc Gait Training Ea 15 Min      06023 (CPT®) Hc Pt Therapeutic Act Ea 15 Min 6     04243 (CPT®) Hc Pt Manual Therapy Ea 15 Min      83940 (CPT®) Hc Pt Iontophoresis Ea 15 Min      65731 (CPT®) Hc Pt Elec Stim Ea-Per 15 Min      76403 (CPT®) Hc Pt Ultrasound Ea 15 Min      23481 (CPT®) Hc Pt Self Care/Mgmt/Train Ea 15 Min      98218 (CPT®) Hc Pt Prosthetic (S) Train Initial Encounter, Each 15 Min      58858 (CPT®) Hc Pt Orthotic(S)/Prosthetic(S) Encounter, Each 15 Min      67921 (CPT®) Hc Orthotic(S) Mgmt/Train Initial Encounter, Each 15min      Total  13 1        Therapy Charges for Today     Code Description Service Date Service Provider Modifiers Qty    29740321034 HC PT THER PROC EA 15 MIN 1/8/2019 Forrest Doe, PT GP 1    42074401891 HC PT THER SUPP EA 15 MIN 1/8/2019 Forrest Doe, PT GP 1    75708200823 HC PT THER PROC EA 15 MIN 1/9/2019 Forrest Doe, PT GP 1    61979408986 HC PT THER SUPP EA 15 MIN 1/9/2019 Forrest Doe, PT GP 1          PT G-Codes  Outcome Measure Options: AM-PAC 6 Clicks Basic Mobility (PT)  AM-PAC 6 Clicks Score: 10  Score: 9    Forrest Doe PT  1/9/2019

## 2019-01-09 NOTE — PROGRESS NOTES
Orthopedic Daily Progress Note      CC: JAY overnight. Patient remains confused, at baseline.     Pain well controlled  General: no fevers, chills  Abdomen: no nausea, vomiting, or diarrhea    No other complaints    Physical Exam:  I have reviewed the vital signs.  Temp:  [97.5 °F (36.4 °C)-98.3 °F (36.8 °C)] 97.5 °F (36.4 °C)  Heart Rate:  [73-87] 77  Resp:  [16-18] 16  BP: (134-179)/(53-86) 146/65    Objective  General Appearance:    Alert, cooperative, no distress  Extremities: No clubbing, cyanosis, or edema to lower extremities  Pulses:  2+ in distal surgical extremity  Skin: Dressing Clean/dry/intact      Results Review:    I have reviewed the labs, radiology results and diagnostic studies:no new labs    Results from last 7 days   Lab Units  01/09/19   0543   01/08/19   0625   WBC 10*3/mm3   --    --   8.59   HEMOGLOBIN g/dL  7.4*   < >  7.5*   PLATELETS 10*3/mm3   --    --   260    < > = values in this interval not displayed.     Results from last 7 days   Lab Units  01/08/19   2033  01/08/19   0625   SODIUM mmol/L   --   136   POTASSIUM mmol/L  3.8  3.2*   CO2 mmol/L   --   30.0   CREATININE mg/dL   --   0.50*   GLUCOSE mg/dL   --   100       I have reviewed the medications.    Assessment/Problem List  POD# 3 Days Post-Op   S/p L TFN for IT femur fracture  Post op, blood loss anemia. Asymptomatic.    Plan  WBAT LLE  PT/OT  Agree with Dr. Roberson in reserving transfusion for Hgb below 7.   DVT ppx with mechanical    Discharge Planning: I expect patient to be discharged to TBD in TBD days.    Hola Yanez Jr., MD  01/09/19  6:52 AM

## 2019-01-09 NOTE — PROGRESS NOTES
Continued Stay Note  [facility]     Patient Name: Val Hill  MRN: [id number]  Today's Date: 1/9/2019    Admit Date: 1/5/2019    Discharge Plan     Row Name 01/09/19 1040       Plan    Plan Comments  [facility] has been rescheduled for 1/10 1400        Discharge Codes    No documentation.       Expected Discharge Date and Time     Expected Discharge Date Expected Discharge Time    [place] 10, 2019             Blanche Radford RN

## 2019-01-09 NOTE — THERAPY TREATMENT NOTE
Acute Care - Occupational Therapy Treatment Note  [facility]     Patient Name: Val Hill  : 1930  MRN: [id number]  Today's Date: 2019  Onset of Illness/Injury or Date of Surgery: 18  Date of Referral to OT: 18  Referring Physician: MD Beau     Admit Date: 2019       ICD-10-CM ICD-9-CM   1. Closed fracture of left femur, unspecified fracture morphology, unspecified portion of femur, initial encounter (CMS/[facility]) S72.92XA 821.00   2. Dementia without behavioral disturbance, unspecified dementia type F03.90 294.20   3. History of CVA (cerebrovascular accident) Z86.73 V12.54   4. History of diabetes mellitus, type II Z86.39 V12.29   5. Impaired functional mobility, balance, gait, and endurance Z74.09 V49.89   6. Impaired mobility and ADLs Z74.09 799.89     Patient Active Problem List   Diagnosis   • Stroke (CMS/[facility])   • UTI (urinary tract infection)   • Metabolic encephalopathy   • Type 2 diabetes mellitus (CMS/[facility])   • Dementia   • Closed fracture of left femur (CMS/[facility])   • Gallstones   • Chronic diastolic CHF (congestive heart failure) (CMS/[facility])     Past Medical History:   Diagnosis Date   • Dementia    • Diabetes mellitus (CMS/[facility])    • Hyperlipidemia    • Hypertension    • Stroke (CMS/[facility])      Past Surgical History:   Procedure Laterality Date   • ELBOW PROCEDURE     • HIP TROCANTERIC NAILING WITH INTRAMEDULLARY HIP SCREW Left 2019    Procedure: HIP TROCANTERIC NAILING WITH INTRAMEDULLARY HIP SCREW LEFT;  Surgeon: Hola Yanez Jr., MD;  Location: [facility];  Service: Orthopedics   • HYSTERECTOMY         Therapy Treatment    Rehabilitation Treatment Summary     Row Name 19 1031             Treatment Time/Intention    Discipline  occupational therapist  -AC      Document Type  therapy note (daily note)  -AC      Subjective Information  complains of;pain  -AC      Patient/Family Observations  No famly present.  Pt received in bed. IV intact  -AC      Patient  Effort  adequate  -AC      Existing Precautions/Restrictions  fall  -AC      Treatment Considerations/Comments  confusion.  LUE cast  -AC      Recorded by [AC] Estee Diaz, OT 01/09/19 1134      Row Name 01/09/19 1031             Cognitive Assessment/Intervention- PT/OT    Affect/Mental Status (Cognitive)  confused  -AC      Orientation Status (Cognition)  oriented to;person  -AC      Follows Commands (Cognition)  follows one step commands;25-49% accuracy  -AC      Safety Deficit (Cognitive)  ability to follow commands;at risk behavior observed;judgment;problem solving;safety precautions awareness;safety precautions follow-through/compliance  -AC      Personal Safety Interventions  fall prevention program maintained;gait belt;nonskid shoes/slippers when out of bed  -AC      Recorded by [AC] Estee Diaz, OT 01/09/19 1134      Row Name 01/09/19 1031             Safety Issues, Functional Mobility    Safety Issues Affecting Function (Mobility)  at risk behavior observed;ability to follow commands;insight into deficits/self awareness;judgment;problem solving;safety precaution awareness  -AC      Impairments Affecting Function (Mobility)  balance;cognition;pain;strength  -AC      Recorded by [AC] Estee Diaz, OT 01/09/19 1134      Row Name 01/09/19 1031             Mobility Assessment/Intervention    Extremity Weight-bearing Status  left lower extremity  -AC      Left Lower Extremity (Weight-bearing Status)  weight-bearing as tolerated (WBAT)  -AC      Recorded by [AC] Estee Diaz, OT 01/09/19 1134      Row Name 01/09/19 1031             Bed Mobility Assessment/Treatment    Bed Mobility Assessment/Treatment  supine-sit  -AC      Supine-Sit Butler (Bed Mobility)  maximum assist (25% patient effort)  -AC      Bed Mobility, Safety Issues  cognitive deficits limit understanding;decreased use of arms for pushing/pulling;decreased use of legs for bridging/pushing  -AC      Assistive Device (Bed Mobility)  draw  sheet;head of bed elevated  -AC      Recorded by [AC] Estee Diaz, OT 01/09/19 1134      Row Name 01/09/19 1031             Transfer Assessment/Treatment    Transfer Assessment/Treatment  bed-chair transfer;sit-stand transfer;stand-sit transfer  -AC      Recorded by [AC] Estee Diaz, OT 01/09/19 1134      Row Name 01/09/19 1031             Bed-Chair Transfer    Bed-Chair [place] (Transfers)  verbal cues;maximum assist (25% patient effort)  -AC      Assistive Device (Bed-Chair Transfers)  -- gait belt and UE support  -AC      Recorded by [AC] Estee Diaz, OT 01/09/19 1134      Row Name 01/09/19 1031             Sit-Stand Transfer    Sit-Stand [place] (Transfers)  maximum assist (25% patient effort)  -AC      Assistive Device (Sit-Stand Transfers)  -- gait belt and UE support  -AC      Recorded by [AC] Estee Diaz, OT 01/09/19 1134      Row Name 01/09/19 1031             Stand-Sit Transfer    Stand-Sit [place] (Transfers)  maximum assist (25% patient effort)  -AC      Assistive Device (Stand-Sit Transfers)  -- gait belt and UE support  -AC      Recorded by [AC] Estee Diaz, OT 01/09/19 1134      Row Name 01/09/19 1031             ADL Assessment/Intervention    58726 - OT Self Care/Mgmt Minutes  3  -AC      BADL Assessment/Intervention  grooming  -AC      Recorded by [AC] Estee Diaz, OT 01/09/19 1134      Row Name 01/09/19 1031             Grooming Assessment/Training    [place] Level (Grooming)  hair care, combing/brushing;moderate assist (50% patient effort);wash face, hands;set up  -AC      Grooming Position  supported sitting  -AC      Comment (Grooming)  limited sustained attn to complete combing hair  -AC      Recorded by [AC] Estee Diaz, OT 01/09/19 1134      Row Name 01/09/19 1031             BADL Safety/Performance    Impairments, BADL Safety/Performance  balance;cognition;pain;strength  -AC      Cognitive Impairments, BADL Safety/Performance  awareness, need for  assistance;insight into deficits/self awareness;judgment;problem solving/reasoning;safety precaution awareness  -AC      Skilled BADL Treatment/Intervention  BADL process/adaptation training  -AC      Recorded by [AC] Estee Diaz, OT 01/09/19 1134      Row Name 01/09/19 1031             Therapeutic Exercise    10003 - OT Therapeutic Exercise Minutes  5  -AC      65597 - OT Therapeutic Activity Minutes  15  -AC      Recorded by [AC] Estee Diaz, OT 01/09/19 1134      Row Name 01/09/19 1031             Therapeutic Exercise    Upper Extremity Range of Motion (Therapeutic Exercise)  shoulder flexion/extension, bilateral;shoulder horizontal abduction/adduction, bilateral;elbow flexion/extension, bilateral  -AC      Hand (Therapeutic Exercise)  finger flexion/extension, bilateral  -AC      Exercise Type (Therapeutic Exercise)  AAROM (active assistive range of motion)  -AC      Position (Therapeutic Exercise)  seated  -AC      Sets/Reps (Therapeutic Exercise)  1/10  -AC      Comment (Therapeutic Exercise)  Cues for full range and to continue to 10 reps  -AC      Recorded by [AC] Estee Diaz, OT 01/09/19 1134      Row Name 01/09/19 1031             Positioning and Restraints    Pre-Treatment Position  in bed  -AC      Post Treatment Position  chair  -AC      In Chair  reclined;call light within reach;encouraged to call for assist;exit alarm on;on mechanical lift sling  -AC      Recorded by [AC] Estee Diaz, OT 01/09/19 1134      Row Name 01/09/19 1031             Pain Scale: Numbers Pre/Post-Treatment    Pain Location - Side  Left  -AC      Pain Location - Orientation  lower  -AC      Pain Location  extremity  -AC      Pain Intervention(s)  Repositioned  -AC      Recorded by [AC] Estee Diaz, OT 01/09/19 1134      Row Name 01/09/19 1031             Pain Scale 2: FACES Pre/Post-Treatment    Pain 2: FACES Scale, Pretreatment  0-->no hurt  -AC      Pain 2: FACES Scale, Post-Treatment  4-->hurts little more  -AC       Recorded by [AC] Estee Diaz TARIQ, OT 01/09/19 1134      Row Name                Wound 01/06/19 1407 Left hip incision    Wound - Properties Group Date first assessed: 01/06/19 [TB] Time first assessed: 1407 [TB] Side: Left [TB] Location: hip [TB] Type: incision [TB] Recorded by:  [TB] Kirti Bishop RN 01/06/19 1407      User Key  (r) = Recorded By, (t) = Taken By, (c) = Cosigned By    Initials Name Effective Dates Discipline     Estee Diaz TARIQ, OT 06/23/15 -  OT    TB Kirti Bishop, RN 06/16/16 -  Nurse        Wound 01/06/19 1407 Left hip incision (Active)   Dressing Appearance dry;intact 1/8/2019  9:45 PM   Dressing Care, Wound hydrocolloid 1/8/2019  9:45 PM       Occupational Therapy Education     Title: PT OT SLP Therapies (In Progress)     Topic: Occupational Therapy (In Progress)     Point: ADL training (In Progress)     Description: Instruct learner(s) on proper safety adaptation and remediation techniques during self care or transfers.   Instruct in proper use of assistive devices.    Learning Progress Summary           Patient Acceptance, E, NR by  at 1/9/2019 11:34 AM    Comment:  benefits of activity,  sequencing with bed mobility                   Point: Precautions (In Progress)     Description: Instruct learner(s) on prescribed precautions during self-care and functional transfers.    Learning Progress Summary           Patient Acceptance, E, NR by  at 1/8/2019  2:44 PM    Comment:  Pt educated on role of OT, safety precautions, and appropriate body mechanics.                   Point: Body mechanics (In Progress)     Description: Instruct learner(s) on proper positioning and spine alignment during self-care, functional mobility activities and/or exercises.    Learning Progress Summary           Patient Acceptance, E, NR by  at 1/8/2019  2:44 PM    Comment:  Pt educated on role of OT, safety precautions, and appropriate body mechanics.                               User Key     Initials  Effective Dates Name Provider Type Discipline     06/23/15 -  Estee Diaz, OT Occupational Therapist OT     03/07/18 -  Elena Tapia, OT Occupational Therapist OT                OT Recommendation and Plan     Plan of Care Review  Plan of Care Reviewed With: patient  Plan of Care Reviewed With: patient  Outcome Summary: Pt max A supine to sit, max A tansfer to chair given UE support.  Pt setup to wash hair, mod A to comb hair.  Pt required active assist with UE ther ex.  Pt limited by confusion, pain and weakness.   Outcome Measures     Row Name 01/09/19 1031 01/08/19 1410 01/08/19 1315       How much help from another person do you currently need...    Turning from your back to your side while in flat bed without using bedrails?  --  2  -MJ  --    Moving from lying on back to sitting on the side of a flat bed without bedrails?  --  2  -MJ  --    Moving to and from a bed to a chair (including a wheelchair)?  --  2  -MJ  --    Standing up from a chair using your arms (e.g., wheelchair, bedside chair)?  --  2  -MJ  --    Climbing 3-5 steps with a railing?  --  1  -MJ  --    To walk in hospital room?  --  1  -MJ  --    AM-PAC 6 Clicks Score  --  10  -MJ  --       How much help from another is currently needed...    Putting on and taking off regular lower body clothing?  1  -AC  --  1  -HK    Bathing (including washing, rinsing, and drying)  1  -AC  --  1  -HK    Toileting (which includes using toilet bed pan or urinal)  1  -AC  --  1  -HK    Putting on and taking off regular upper body clothing  2  -AC  --  2  -HK    Taking care of personal grooming (such as brushing teeth)  2  -AC  --  2  -HK    Eating meals  2  -AC  --  2  -HK    Score  9  -AC  --  9  -HK       Functional Assessment    Outcome Measure Options  --  AM-PAC 6 Clicks Basic Mobility (PT)  -MJ  AM-PAC 6 Clicks Daily Activity (OT)  -HK    Row Name 01/07/19 0840             How much help from another person do you currently need...    Turning from  your back to your side while in flat bed without using bedrails?  2  -MJ      Moving from lying on back to sitting on the side of a flat bed without bedrails?  2  -MJ      Moving to and from a bed to a chair (including a wheelchair)?  2  -MJ      Standing up from a chair using your arms (e.g., wheelchair, bedside chair)?  2  -MJ      Climbing 3-5 steps with a railing?  1  -MJ      To walk in hospital room?  1  -MJ      AM-PAC 6 Clicks Score  10  -MJ         Functional Assessment    Outcome Measure Options  AM-PAC 6 Clicks Basic Mobility (PT)  -MJ        User Key  (r) = Recorded By, (t) = Taken By, (c) = Cosigned By    Initials Name Provider Type    AC Estee Diaz, OT Occupational Therapist    Forrest Singh, PT Physical Therapist    HK Elena Tapia, OT Occupational Therapist           Time Calculation:   Time Calculation- OT     Row Name 01/09/19 1031             Time Calculation- OT    OT Start Time  1031  -AC      Total Timed Code Minutes- OT  23 minute(s)  -AC      OT Received On  01/09/19  -AC      OT Goal Re-Cert Due Date  01/18/19  -         Timed Charges    55395 - OT Therapeutic Exercise Minutes  5  -AC      36230 - OT Therapeutic Activity Minutes  15  -AC      67162 - OT Self Care/Mgmt Minutes  3  -AC        User Key  (r) = Recorded By, (t) = Taken By, (c) = Cosigned By    Initials Name Provider Type    AC Estee Diaz, OT Occupational Therapist           Therapy Suggested Charges     Code   Minutes Charges    67784 (CPT®) Hc Ot Neuromusc Re Education Ea 15 Min      22002 (CPT®) Hc Ot Ther Proc Ea 15 Min 5 1    59141 (CPT®) Hc Ot Therapeutic Act Ea 15 Min 15 1    90307 (CPT®) Hc Ot Manual Therapy Ea 15 Min      52127 (CPT®) Hc Ot Iontophoresis Ea 15 Min      17443 (CPT®) Hc Ot Elec Stim Ea-Per 15 Min      85451 (CPT®) Hc Ot Ultrasound Ea 15 Min      21393 (CPT®) Hc Ot Self Care/Mgmt/Train Ea 15 Min 3     Total  23 2        Therapy Charges for Today     Code Description Service Date Service Provider  Modifiers Qty    [id number]  OT THER PROC EA 15 MIN 1/9/2019 Estee Diaz, OT GO 1    [id number] HC OT THERAPEUTIC ACT EA 15 MIN 1/9/2019 Estee Diaz, OT GO 1               Estee Diaz OT  1/9/2019

## 2019-01-09 NOTE — PLAN OF CARE
Problem: Patient Care Overview  Goal: Plan of Care Review  Outcome: Ongoing (interventions implemented as appropriate)   01/09/19 1136   Coping/Psychosocial   Plan of Care Reviewed With patient   Plan of Care Review   Progress improving   OTHER   Outcome Summary Pt performed sit to stand x2 reps with MaxAx2 and B UE support. Pt with improved posture and balance in static standing this date and increased ther ex reps. Will continue to progress mobility as able

## 2019-01-09 NOTE — PROGRESS NOTES
[facility] Medicine Services  PROGRESS NOTE    Patient Name: Val Hill  : 1930  MRN: [id number]    Date of Admission: 2019  Length of Stay: 3  Primary Care Physician: Provider, No Known    Subjective   Subjective     CC:  F/u left hip fracture    HPI:  Resting in bed in no acute distress and tells me she feels better than yesterday.  Denies any pain except when she moves the right lower extremity.    Review of Systems  Unable to obtain secondary to dementia.    Objective   Objective     Vital Signs:   Temp:  [97.5 °F (36.4 °C)-98.3 °F (36.8 °C)] 97.7 °F (36.5 °C)  Heart Rate:  [74-82] 74  Resp:  [16-20] 20  BP: (137-179)/(53-85) 148/68        Physical Exam:  Constitutional: No acute distress, sleeping but arouses  HENT: NCAT, mucous membranes moist  Respiratory: Clear to auscultation bilaterally, respiratory effort normal   Cardiovascular: RRR, no murmurs, rubs, or gallops  Gastrointestinal: Positive bowel sounds, soft, nontender, nondistended  Musculoskeletal: No bilateral ankle edema, pain with passive motion of left leg.  Cast to left wrist.  Psychiatric: Appropriate affect, cooperative  Neurologic: A,A, follows commands.  Skin: No rashes      Results Reviewed:  I have personally reviewed current lab, radiology, and data and agree.    Results from last 7 days   Lab Units  19   0543  199  19   WBC 10*3/mm3   --    --    --    --   8.59  13.59*  14.11*   HEMOGLOBIN g/dL  7.4*  7.5*  8.5*   < >  7.5*  10.9*  12.2   HEMATOCRIT %  22.9*  23.2*  26.0*   < >  23.1*  33.8*  37.8   PLATELETS 10*3/mm3   --    --    --    --   260  281  330   INR    --    --    --    --    --   1.10*   --     < > = values in this interval not displayed.     Results from last 7 days   Lab Units  1919   0259  19   SODIUM mmol/L   --   136  138    --   137   POTASSIUM mmol/L  3.8  3.2*  3.5   --   3.9   CHLORIDE mmol/L   --   100  105   --   102   CO2 mmol/L   --   30.0  27.0   --   29.0   BUN mg/dL   --   9  8*   --   10   CREATININE mg/dL   --   0.50*  0.59*   --   0.66   GLUCOSE mg/dL   --   100  182*   --   186*   CALCIUM mg/dL   --   8.9  8.2*   --   9.5   ALT (SGPT) U/L   --   19   --    --   31   AST (SGOT) U/L   --   21   --    --   23   TROPONIN I ng/mL   --    --    --   0.00   --      Estimated Creatinine Clearance: 35.1 mL/min (A) (by C-G formula based on SCr of 0.5 mg/dL (L)).    No results found for: BNP    Microbiology Results Abnormal     None          Imaging Results (last 24 hours)     Procedure Component Value Units Date/Time    CT Lower Extremity Left Without Contrast [[id number]] Collected:  01/08/19 1546     Updated:  01/08/19 1549    Narrative:       EXAMINATION: CT LOWER EXTREMITY, LEFT, WO CONTRAST-01/08/2019:      INDICATION: Injury of muscle, fascia and tendon at hip and thigh level.     TECHNIQUE:  Axial CT data of the left lower extremity (femur) were  acquired helically without contrast. Multiplanar reformatted images were  generated and reviewed.      The radiation dose reduction device was turned on for each scan per the  ALARA (As Low as Reasonably Achievable) protocol     COMPARISONS:  Radiograph 01/05/2019.     FINDINGS:  Interval operative fixation of the intertrochanteric left  femur fracture. Expected position of hardware and bones identified.  There is expected soft tissue emphysema and swelling. Small soft tissue  hematoma is seen deep to the skin closure staples. Colonic  diverticulosis incidentally noted.       Impression:       Postop ORIF left hip. Small hematoma deep to the inferior  margin of the skin closure staples.     D:  01/08/2019  E:  01/08/2019           This report was finalized on 1/8/2019 3:47 PM by Vin Johnston.             Results for orders placed during the hospital encounter of 11/16/18   Adult  Transthoracic Echo Complete W/ Cont if Necessary Per Protocol (With Agitated Saline)    Narrative · Left ventricular systolic function is normal. Estimated EF = 60%.  · Left ventricular diastolic dysfunction (grade I) consistent with   impaired relaxation.  · Mild mitral valve regurgitation is present  · Mild tricuspid valve regurgitation is present.  · Calculated right ventricular systolic pressure from tricuspid   regurgitation is 32 mmHg.  · Negative saline bubble study.          I have reviewed the medications:    Current Facility-Administered Medications:   •  acetaminophen (TYLENOL) tablet 650 mg, 650 mg, Oral, Q4H PRN, Jenny Groves APRN, 650 mg at 01/05/19 2340  •  artificial tears (LUBRIFRESH P.M.) ophthalmic ointment, , Topical, Q1H PRN, Salome Velazquez APRN  •  atorvastatin (LIPITOR) tablet 40 mg, 40 mg, Oral, Nightly, Jenny Groves, APRN, 40 mg at 01/08/19 2143  •  busPIRone (BUSPAR) tablet 5 mg, 5 mg, Oral, TID, Stefan Chirinos MD, 5 mg at 01/09/19 0843  •  dextrose (D50W) 25 g/ 50mL Intravenous Solution 25 g, 25 g, Intravenous, Q15 Min PRN, Jenny Groves, APRN  •  dextrose (GLUTOSE) oral gel 15 g, 15 g, Oral, Q15 Min PRN, Jenny Groves, APRN  •  docusate sodium (COLACE) capsule 100 mg, 100 mg, Oral, Daily, Jenny Groves APRN, 100 mg at 01/08/19 0934  •  donepezil (ARICEPT) tablet 20 mg, 20 mg, Oral, Nightly, Jenny Groves, APRN, 20 mg at 01/08/19 2143  •  FLUoxetine (PROzac) capsule 20 mg, 20 mg, Oral, Daily, Jenny Groves, APRN, 20 mg at 01/08/19 0923  •  glucagon (human recombinant) (GLUCAGEN DIAGNOSTIC) injection 1 mg, 1 mg, Subcutaneous, PRN, Jenny Groves W, APRN  •  HYDROcodone-acetaminophen (NORCO) 7.5-325 MG per tablet 1 tablet, 1 tablet, Oral, Q4H PRN, Hola Yanez Jr., MD, 1 tablet at 01/09/19 0844  •  insulin lispro (humaLOG) injection 0-7 Units, 0-7 Units, Subcutaneous, 4x Daily With Meals & Nightly, Jenny Groves APRN,  2 Units at 01/08/19 1645  •  lisinopril (PRINIVIL,ZESTRIL) tablet 10 mg, 10 mg, Oral, Q24H, Leon Ma MD, 10 mg at 01/09/19 1240  •  LORazepam (ATIVAN) injection 0.25 mg, 0.25 mg, Intravenous, Q6H PRN, Stefan Chirinos MD, 0.25 mg at 01/09/19 1341  •  Morphine sulfate (PF) injection 2 mg, 2 mg, Intravenous, Q4H PRN, 2 mg at 01/09/19 1439 **AND** naloxone (NARCAN) injection 0.4 mg, 0.4 mg, Intravenous, Q5 Min PRN, Stefan Chirinos MD  •  ondansetron (ZOFRAN) tablet 4 mg, 4 mg, Oral, Q6H PRN **OR** ondansetron (ZOFRAN) injection 4 mg, 4 mg, Intravenous, Q6H PRN, Jenny Groves APRN  •  potassium chloride (MICRO-K) CR capsule 40 mEq, 40 mEq, Oral, PRN, 40 mEq at 01/08/19 1603 **OR** potassium chloride (KLOR-CON) packet 40 mEq, 40 mEq, Oral, PRN **OR** potassium chloride 10 mEq in 100 mL IVPB, 10 mEq, Intravenous, Q1H PRN, Leon Ma MD  •  potassium chloride (MICRO-K) CR capsule 10 mEq, 10 mEq, Oral, Daily, Jenny Groves APRN, 10 mEq at 01/08/19 0924  •  QUEtiapine (SEROquel) tablet 25 mg, 25 mg, Oral, Nightly, Jenny Groves APRN, 25 mg at 01/08/19 2143  •  QUEtiapine (SEROquel) tablet 25 mg, 25 mg, Oral, Q12H PRN, Leon Ma MD, 25 mg at 01/09/19 1240  •  sertraline (ZOLOFT) tablet 25 mg, 25 mg, Oral, Daily, Stefan Chirinos MD, 25 mg at 01/09/19 0844  •  [COMPLETED] Insert peripheral IV, , , Once **AND** sodium chloride 0.9 % flush 10 mL, 10 mL, Intravenous, PRN, Susan Cotter APRN  •  sodium chloride 0.9 % flush 3 mL, 3 mL, Intravenous, Q12H, Jenny Groves APRN, 3 mL at 01/07/19 1229  •  sodium chloride 0.9 % flush 3-10 mL, 3-10 mL, Intravenous, PRN, Jenny Groves APRN  •  Sodium chloride 0.9 % infusion, 75 mL/hr, Intravenous, Continuous, DossettStefan MD, Stopped at 01/07/19 1400  •  sucralfate (CARAFATE) tablet 1 g, 1 g, Oral, 4x Daily AC & at Bedtime, Jenny Groves APRN, 1 g at 01/09/19 1240  •  vitamin B-12 (CYANOCOBALAMIN) tablet 1,000  mcg, 1,000 mcg, Oral, Daily, Jenny Groves, APRN, 1,000 mcg at 01/09/19 0844      Assessment/Plan   Assessment / Plan     Active Hospital Problems    Diagnosis Date Noted   • **Closed fracture of left femur (CMS/HCC) [S72.92XA] 01/05/2019   • Chronic diastolic CHF (congestive heart failure) (CMS/HCC) [I50.32] 01/06/2019   • Gallstones [K80.20] 01/05/2019   • Dementia [F03.90] 11/17/2018   • Type 2 diabetes mellitus (CMS/HCC) [E11.9] 11/17/2018          Brief Hospital Course to date:  Val Hill is a 88 y.o. female with history of dementia, CVA in 11/2018, DM2 who presents with unwitnessed fall and found to have left femur fracture.  Also recent fall with left wrist fracture that was casted by Dr. Erwin.      - left femur fracture, S/P surgery on 1/6/19.    - Second dropping hemoglobin and hematocrit.  Patient does not have any hematochezia or melena or hematemesis.    - DM    - advanced dementia.    - patient is DNR.  Expect further decline with this event.  +/- palliative eval depending on family wishes and how she does.     PLAN:  - transfuse one unit PRBC  - labs in am.      DVT Prophylaxis:  Mechanical in anticipation possible surgery    Disposition: I expect the patient to be discharged to SNF when medically ready..    CODE STATUS:   Code Status and Medical Interventions:   Ordered at: 01/05/19 2247     Limited Support to NOT Include:    Dialysis    Intubation    Artificial Nutrition     Level Of Support Discussed With:    Next of Kin (If No Surrogate)     Code Status:    No CPR     Medical Interventions (Level of Support Prior to Arrest):    Limited         Electronically signed by Leon Ma MD, 01/09/19, 3:26 PM.

## 2019-01-09 NOTE — PLAN OF CARE
Problem: Patient Care Overview  Goal: Plan of Care Review  Outcome: Ongoing (interventions implemented as appropriate)   01/09/19 1031   Coping/Psychosocial   Plan of Care Reviewed With patient   Plan of Care Review   Progress no change   OTHER   Outcome Summary Pt max A supine to sit, max A tansfer to chair given UE support. Pt setup to wash hair, mod A to comb hair. Pt required active assist with UE ther ex. Pt limited by confusion, pain and weakness.

## 2019-01-10 NOTE — PROGRESS NOTES
Continued Stay Note  [facility]     Patient Name: Val Hill  MRN: [id number]  Today's Date: 1/10/2019    Admit Date: 1/5/2019    Discharge Plan     Row Name 01/10/19 1057       Plan    Plan  [facility]    Patient/Family in Agreement with Plan  yes    Plan Comments  Discussed Ms. Hill at unit rounds this morning, and she is not medically ready for transfer to [facility].  Visited Ms. Hill at the bedside and she was asleep and no family at the bedside.  Contacted Marlen at [facility] and she will continue to follow the patient.  [facility] ambulance rescheduled.  PCS form on the chart.    CM will continue to follow.    Final Discharge Disposition Code  03 - skilled nursing facility (SNF)        Discharge Codes    No documentation.       Expected Discharge Date and Time     Expected Discharge Date Expected Discharge Time    Dylan 10, 2019             Ernestine Dennis

## 2019-01-10 NOTE — PLAN OF CARE
Problem: Patient Care Overview  Goal: Plan of Care Review  Outcome: Ongoing (interventions implemented as appropriate)   01/09/19 2040 01/10/19 0330   Coping/Psychosocial   Plan of Care Reviewed With patient --    Plan of Care Review   Progress --  no change   OTHER   Outcome Summary --  Patient somewhat restless this shift. Received 1 unit [facility] on 1/9 evening. Slept well in between care, incontinent of bowel and bladder.

## 2019-01-10 NOTE — PROGRESS NOTES
"                  Clinical Nutrition     Nutrition Assessment  Reason for Visit:   Follow-up protocol      Patient Name: Val Hill  YOB: 1930  MRN: [id number]  Date of Encounter: 01/10/19 10:09 AM  Admission date: 1/5/2019    Note patient with poor PO intake. Unable to determine if patient is consuming Boost pudding or Boost GC supplements. These are currently being sent at lunch and dinner. Will add one to breakfast as well. RN reports patient did not eat breakfast this morning, pushed tray away.    Poor PO intake for almost 3 weeks now      Nutrition Assessment   Assessment       Hospital Problem List    Closed fracture of left femur (CMS/HCC)    Type 2 diabetes mellitus (CMS/HCC)    Dementia    Gallstones    Chronic diastolic CHF (congestive heart failure) (CMS/[facility])    Severe, Chronic Malnutrition (dx per RD 1/8)    Applicable procedures/tests during this admission:  (1/6) s/p left hip trochanteric nailing with intramedullary hip screw      Applicable PMH:  -HLD  -HTN  -Hx of CVA (11/2018)      Reported/Observed/Food/Nutrition Related History:   RN reports this is first day with patient. Patient did not eat much yesterday at meals. Did not eat anything at breakfast this morning, pushed tray away. Unsure if patient has been consuming any of the oral nutrition supplements.      Anthropometrics     Height: 170.2 cm (67\")  Last filed wt: Weight: 45.7 kg (100 lb 12 oz) (01/05/19 1830) - no method specified       BMI: BMI (Calculated): 15.8  Underweight:<18.5kg/m2    Ideal Body Weight (IBW) (kg): 61.86  Admission wt: 100 lb 12 oz  Method obtained: unknown method (1/5)    Unable to determine patient's UBW    Weight change: RD notes patient weighed 115 lbs at MD office visit 12/27 per EMR. RD cannot determine accurate weight loss status at this point given current admission wt method not specified and patient unable to stand. RD obtained bedscale weight at visit, but inaccurately read 140 " lbs.      Labs reviewed   Reviewed: Yes    Medications reviewed   Reviewed: Yes    Current Nutrition Prescription     PO: Diet Regular; Nectar / Syrup Thick; Cardiac, Consistent Carbohydrate  Oral Nutrition Supplements: Boost GC daily, Boost pudding daily    Intake: 16% / 5 meals      Nutrition Diagnosis     1/6  Problem Underweight   Etiology dementia   Signs/Symptoms BMI=15.78   Status: ongoing    1/8  Problem Inadequate oral intake   Etiology Clinical condition   Signs/Symptoms PO intake: 16% / 5 meals   Status: ongoing, revised 1/10    1/8  Problem Malnutrition (severe, chronic)   Etiology Clinical condition   Signs/Symptoms Severe muscle wasting (temporalis, clavicle, deltoid, quadriceps); severe fat loss (orbital)   Status: ongoing      Nutrition Intervention   1.  Follow treatment progress, Care plan reviewed, Interview for preferences, Encourage intake, Supplement provided     2. Ordered chocolate Boost pudding daily with breakfast      Goal:   General: Nutrition support treatment  PO: Increase intake    Monitoring/Evaluation:   Per protocol, PO intake, Supplement intake, Weight    Will Continue to follow per protocol  Vicky Sandoval RD  Time Spent: 20 min

## 2019-01-10 NOTE — THERAPY TREATMENT NOTE
Acute Care - Occupational Therapy Treatment Note  [facility]     Patient Name: Val Hill  : 1930  MRN: [id number]  Today's Date: 1/10/2019  Onset of Illness/Injury or Date of Surgery: 18  Date of Referral to OT: 18  Referring Physician: MD Beau     Admit Date: 2019       ICD-10-CM ICD-9-CM   1. Closed fracture of left femur, unspecified fracture morphology, unspecified portion of femur, initial encounter (CMS/[facility]) S72.92XA 821.00   2. Dementia without behavioral disturbance, unspecified dementia type F03.90 294.20   3. History of CVA (cerebrovascular accident) Z86.73 V12.54   4. History of diabetes mellitus, type II Z86.39 V12.29   5. Impaired functional mobility, balance, gait, and endurance Z74.09 V49.89   6. Impaired mobility and ADLs Z74.09 799.89     Patient Active Problem List   Diagnosis   • Stroke (CMS/[facility])   • UTI (urinary tract infection)   • Metabolic encephalopathy   • Type 2 diabetes mellitus (CMS/[facility])   • Dementia   • Closed fracture of left femur (CMS/[facility])   • Gallstones   • Chronic diastolic CHF (congestive heart failure) (CMS/[facility])     Past Medical History:   Diagnosis Date   • Dementia    • Diabetes mellitus (CMS/[facility])    • Hyperlipidemia    • Hypertension    • Stroke (CMS/[facility])      Past Surgical History:   Procedure Laterality Date   • ELBOW PROCEDURE     • HIP TROCANTERIC NAILING WITH INTRAMEDULLARY HIP SCREW Left 2019    Procedure: HIP TROCANTERIC NAILING WITH INTRAMEDULLARY HIP SCREW LEFT;  Surgeon: Hola Yanez Jr., MD;  Location: [facility];  Service: Orthopedics   • HYSTERECTOMY         Therapy Treatment    Rehabilitation Treatment Summary     Row Name 01/10/19 1105             Treatment Time/Intention    Discipline  occupational therapist  -AC      Document Type  therapy note (daily note)  -AC      Subjective Information  complains of;pain  -AC      Patient/Family Observations  No family present.  Pt received in bed.  IV intact  -AC      Patient  Effort  adequate  -AC      Existing Precautions/Restrictions  fall  -AC      Treatment Considerations/Comments  dementia, Cast LUE  -AC2      Recorded by [AC] Estee Diaz, OT 01/10/19 1138  [AC2] Estee Diaz, OT 01/10/19 1148      Row Name 01/10/19 1105             Cognitive Assessment/Intervention- PT/OT    Affect/Mental Status (Cognitive)  confused pt confusing therapist with someone she knows  -AC      Orientation Status (Cognition)  oriented to;person  -AC2      Follows Commands (Cognition)  follows one step commands;25-49% accuracy;physical/tactile prompts required;repetition of directions required;verbal cues/prompting required  -AC      Safety Deficit (Cognitive)  ability to follow commands;at risk behavior observed;judgment;problem solving;safety precautions awareness;safety precautions follow-through/compliance  -AC      Personal Safety Interventions  fall prevention program maintained;gait belt;nonskid shoes/slippers when out of bed  -AC      Recorded by [AC] Estee Diaz, OT 01/10/19 1148  [AC2] Estee Diaz, OT 01/10/19 1138      Row Name 01/10/19 1105             Safety Issues, Functional Mobility    Safety Issues Affecting Function (Mobility)  at risk behavior observed;awareness of need for assistance;insight into deficits/self awareness;judgment;problem solving;safety precautions follow-through/compliance;sequencing abilities  -AC      Recorded by [AC] Estee Diaz, OT 01/10/19 1148      Row Name 01/10/19 1105             Mobility Assessment/Intervention    Extremity Weight-bearing Status  left lower extremity  -AC      Left Lower Extremity (Weight-bearing Status)  weight-bearing as tolerated (WBAT)  -AC      Recorded by [AC] Estee Diaz, OT 01/10/19 1148      Row Name 01/10/19 1105             Bed Mobility Assessment/Treatment    Bed Mobility Assessment/Treatment  supine-sit;scooting/bridging  -AC      Scooting/Bridging Christian (Bed Mobility)  maximum assist (25% patient effort)   -AC      Supine-Sit K[place] (Bed Mobility)  maximum assist (25% patient effort)  -AC      Bed Mobility, Safety Issues  cognitive deficits limit understanding;decreased use of arms for pushing/pulling;decreased use of legs for bridging/pushing  -AC      Assistive Device (Bed Mobility)  draw sheet;head of bed elevated  -AC      Comment (Bed Mobility)  VCs to sequence,  pt assisting R leg to EOB,  and required assist to bring trunk to midline  -AC      Recorded by [AC] Estee Diaz, OT 01/10/19 1148      Row Name 01/10/19 1105             Transfer Assessment/Treatment    Transfer Assessment/Treatment  bed-chair transfer;sit-stand transfer;stand-sit transfer  -AC      Recorded by [AC] Estee Diaz, OT 01/10/19 1148      Row Name 01/10/19 1105             Bed-Chair Transfer    Bed-Chair Kinde (Transfers)  verbal cues;maximum assist (25% patient effort)  -AC      Assistive Device (Bed-Chair Transfers)  -- stand pivot, held onto OT's arms, able to take sidestep   -AC      Recorded by [AC] Estee Diaz, OT 01/10/19 1148      Row Name 01/10/19 1105             Sit-Stand Transfer    Sit-Stand Kinde (Transfers)  maximum assist (25% patient effort)  -AC      Assistive Device (Sit-Stand Transfers)  -- gait belt and UE support  -AC      Recorded by [AC] Estee Diaz, OT 01/10/19 1148      Row Name 01/10/19 1105             Stand-Sit Transfer    Stand-Sit Kinde (Transfers)  verbal cues;maximum assist (25% patient effort)  -AC      Assistive Device (Stand-Sit Transfers)  -- gait belt and UE support  -AC      Recorded by [AC] Estee Diaz, OT 01/10/19 1148      Row Name 01/10/19 1105             ADL Assessment/Intervention    08362 - OT Self Care/Mgmt Minutes  3  -AC      BADL Assessment/Intervention  grooming  -AC      Recorded by [AC] Estee Diaz, OT 01/10/19 1148      Row Name 01/10/19 1105             Lower Body Dressing Assessment/Training    Lower Body Dressing Kinde Level   don;socks;dependent (less than 25% patient effort)  -AC      Recorded by [AC] Estee Diaz, OT 01/10/19 1148      Row Name 01/10/19 1105             Grooming Assessment/Training    [place] Level (Grooming)  hair care, combing/brushing;moderate assist (50% patient effort);wash face, hands;set up  -AC      Grooming Position  supported sitting  -AC      Recorded by [AC] Estee Diaz, OT 01/10/19 1148      Row Name 01/10/19 1105             BADL Safety/Performance    Impairments, BADL Safety/Performance  balance;cognition;pain;strength;trunk/postural control  -AC      Recorded by [AC] Estee Diaz, OT 01/10/19 1148      Row Name 01/10/19 1105             Therapeutic Exercise    55139 - PT Manual Therapy Minutes  5  -AC      86212 - OT Therapeutic Activity Minutes  15  -AC      Recorded by [AC] Estee Diaz, OT 01/10/19 1148      Row Name 01/10/19 1105             Therapeutic Exercise    Upper Extremity Range of Motion (Therapeutic Exercise)  shoulder flexion/extension, bilateral;shoulder horizontal abduction/adduction, right;elbow flexion/extension, bilateral  -AC      Hand (Therapeutic Exercise)  finger flexion/extension, bilateral  -AC      Exercise Type (Therapeutic Exercise)  AAROM (active assistive range of motion)  -AC      Sets/Reps (Therapeutic Exercise)  1/15  -AC      Comment (Therapeutic Exercise)  Cues for technique, to sustain attn to 15 reps  -AC      Recorded by [AC] Estee Diaz, OT 01/10/19 1148      Row Name 01/10/19 1105             Static Sitting Balance    Level of [place] (Unsupported Sitting, Static Balance)  moderate assist, 50 to 74% patient effort  -AC      Time Able to Maintain Position (Unsupported Sitting, Static Balance)  2 to 3 minutes  -AC      Comment (Unsupported Sitting, Static Balance)  post and R lean, requesting to return to supine  -AC      Recorded by [AC] Estee Diaz, OT 01/10/19 1148      Row Name 01/10/19 1105             Positioning and Restraints     Pre-Treatment Position  in bed  -AC      Post Treatment Position  chair  -AC      In Chair  reclined;call light within reach;encouraged to call for assist;exit alarm on;RUE elevated;LUE elevated;on mechanical lift sling  -AC      Recorded by [AC] Estee Diaz, OT 01/10/19 1148      Row Name 01/10/19 1105             Pain Scale: Numbers Pre/Post-Treatment    Pain Location - Side  Left  -AC      Pain Location  hip  -AC      Pain Intervention(s)  Repositioned  -AC      Recorded by [AC] Estee Diaz, OT 01/10/19 1148      Row Name 01/10/19 1105             Pain Scale 2: FACES Pre/Post-Treatment    Pain 2: FACES Scale, Pretreatment  2-->hurts little bit  -AC      Pain 2: FACES Scale, Post-Treatment  4-->hurts little more  -AC      Recorded by [AC] Estee Diaz, OT 01/10/19 1148      Row Name                Wound 01/06/19 1407 Left hip incision    Wound - Properties Group Date first assessed: 01/06/19 [TB] Time first assessed: 1407 [TB] Side: Left [TB] Location: hip [TB] Type: incision [TB] Recorded by:  [TB] Kirti Bishop RN 01/06/19 1407      User Key  (r) = Recorded By, (t) = Taken By, (c) = Cosigned By    Initials Name Effective Dates Discipline     Estee Diaz, OT 06/23/15 -  OT    TB Kirti Bishop, RN 06/16/16 -  Nurse        Wound 01/06/19 1407 Left hip incision (Active)   Dressing Appearance dry;intact 1/10/2019  7:55 AM   Closure JULISA 1/9/2019  4:00 PM   Drainage Characteristics/Odor serosanguineous 1/10/2019  7:55 AM   Drainage Amount small 1/10/2019  7:55 AM   Dressing Care, Wound dressing changed 1/10/2019  7:55 AM       Occupational Therapy Education     Title: PT OT SLP Therapies (In Progress)     Topic: Occupational Therapy (In Progress)     Point: ADL training (In Progress)     Description: Instruct learner(s) on proper safety adaptation and remediation techniques during self care or transfers.   Instruct in proper use of assistive devices.    Learning Progress Summary           Patient  Acceptance, E, NL,NR by  at 1/10/2019 11:49 AM    Comment:  benefits of activity    Nonacceptance, E,TB,D, NL by  at 1/9/2019  7:09 PM    Comment:  pt very confused    Acceptance, E, NR by  at 1/9/2019 11:34 AM    Comment:  benefits of activity,  sequencing with bed mobility                   Point: Home exercise program (In Progress)     Description: Instruct learner(s) on appropriate technique for monitoring, assisting and/or progressing therapeutic exercises/activities.    Learning Progress Summary           Patient Nonacceptance, E,TB,D, NL by  at 1/9/2019  7:09 PM    Comment:  pt very confused                   Point: Precautions (In Progress)     Description: Instruct learner(s) on prescribed precautions during self-care and functional transfers.    Learning Progress Summary           Patient Nonacceptance, E,TB,D, NL by  at 1/9/2019  7:09 PM    Comment:  pt very confused    Acceptance, E, NR by  at 1/8/2019  2:44 PM    Comment:  Pt educated on role of OT, safety precautions, and appropriate body mechanics.                   Point: Body mechanics (In Progress)     Description: Instruct learner(s) on proper positioning and spine alignment during self-care, functional mobility activities and/or exercises.    Learning Progress Summary           Patient Nonacceptance, E,TB,D, NL by  at 1/9/2019  7:09 PM    Comment:  pt very confused    Acceptance, E, NR by  at 1/8/2019  2:44 PM    Comment:  Pt educated on role of OT, safety precautions, and appropriate body mechanics.                               User Key     Initials Effective Dates Name Provider Type Discipline     06/23/15 -  Estee Diaz OT Occupational Therapist OT     02/06/17 -  Luis Saldana RN Registered Nurse Nurse     03/07/18 -  Elena Tapia, OT Occupational Therapist OT                OT Recommendation and Plan     Plan of Care Review  Plan of Care Reviewed With: patient  Plan of Care Reviewed With: patient  Outcome  Summary: Pt max A supine to sit, mod A sitting balance with post and R lean, Max A transfer bed to chair with BUE support.  Pt mod A to comb hair, setup to wash face.  Pt required active assist and Vcs to complete UE ther ex.  Pt limited by confusion, decreased command following and sustained attn to task, weakness and pain.  Outcome Measures     Row Name 01/10/19 1105 01/09/19 1136 01/09/19 1031       How much help from another person do you currently need...    Turning from your back to your side while in flat bed without using bedrails?  --  2  -MJ  --    Moving from lying on back to sitting on the side of a flat bed without bedrails?  --  2  -MJ  --    Moving to and from a bed to a chair (including a wheelchair)?  --  2  -MJ  --    Standing up from a chair using your arms (e.g., wheelchair, bedside chair)?  --  2  -MJ  --    Climbing 3-5 steps with a railing?  --  1  -MJ  --    To walk in hospital room?  --  1  -MJ  --    AM-[facility] 6 Clicks Score  --  10  -MJ  --       How much help from another is currently needed...    Putting on and taking off regular lower body clothing?  1  -AC  --  1  -AC    Bathing (including washing, rinsing, and drying)  1  -AC  --  1  -AC    Toileting (which includes using toilet bed pan or urinal)  1  -AC  --  1  -AC    Putting on and taking off regular upper body clothing  2  -AC  --  2  -AC    Taking care of personal grooming (such as brushing teeth)  2  -AC  --  2  -AC    Eating meals  2  -AC  --  2  -AC    Score  9  -AC  --  9  -AC       Functional Assessment    Outcome Measure Options  AM-PAC 6 Clicks Daily Activity (OT)  -AC  AM-PAC 6 Clicks Basic Mobility (PT)  -MJ  --    Row Name 01/08/19 1410 01/08/19 1315          How much help from another person do you currently need...    Turning from your back to your side while in flat bed without using bedrails?  2  -MJ  --     Moving from lying on back to sitting on the side of a flat bed without bedrails?  2  -MJ  --     Moving to and  from a bed to a chair (including a wheelchair)?  2  -MJ  --     Standing up from a chair using your arms (e.g., wheelchair, bedside chair)?  2  -MJ  --     Climbing 3-5 steps with a railing?  1  -MJ  --     To walk in hospital room?  1  -MJ  --     AM-PAC 6 Clicks Score  10  -MJ  --        How much help from another is currently needed...    Putting on and taking off regular lower body clothing?  --  1  -HK     Bathing (including washing, rinsing, and drying)  --  1  -HK     Toileting (which includes using toilet bed pan or urinal)  --  1  -HK     Putting on and taking off regular upper body clothing  --  2  -HK     Taking care of personal grooming (such as brushing teeth)  --  2  -HK     Eating meals  --  2  -HK     Score  --  9  -HK        Functional Assessment    Outcome Measure Options  AM-PAC 6 Clicks Basic Mobility (PT)  -MJ  AM-PAC 6 Clicks Daily Activity (OT)  -HK       User Key  (r) = Recorded By, (t) = Taken By, (c) = Cosigned By    Initials Name Provider Type    Estee Chamorro, OT Occupational Therapist    Forrest Singh, PT Physical Therapist    HK Elena Tapia, OT Occupational Therapist           Time Calculation:   Time Calculation- OT     Row Name 01/10/19 1155 01/10/19 1105          Time Calculation- OT    OT Start Time  --  1105  -     Total Timed Code Minutes- OT  --  23 minute(s)  -     OT Received On  --  01/10/19  -     OT Goal Re-Cert Due Date  --  01/18/19  -        Timed Charges    86692 - OT Therapeutic Exercise Minutes  5  -AC  --     35225 - OT Therapeutic Activity Minutes  --  15  -     48382 - OT Self Care/Mgmt Minutes  --  3  -AC       User Key  (r) = Recorded By, (t) = Taken By, (c) = Cosigned By    Initials Name Provider Type    Estee Chamorro, OT Occupational Therapist           Therapy Suggested Charges     Code   Minutes Charges    69036 (CPT®)  Ot Neuromusc Re Education Ea 15 Min      07985 (CPT®) Hc Ot Ther Proc Ea 15 Min 5 1    61667 (CPT®) Hc Ot Therapeutic  Act Ea 15 Min 15 1    66104 (CPT®) Hc Ot Manual Therapy Ea 15 Min      80333 (CPT®) Hc Ot Iontophoresis Ea 15 Min      00164 (CPT®) Hc Ot Elec Stim Ea-Per 15 Min      19357 (CPT®) Hc Ot Ultrasound Ea 15 Min      41886 (CPT®) Hc Ot Self Care/Mgmt/Train Ea 15 Min 3     Total  23 2        Therapy Charges for Today     Code Description Service Date Service Provider Modifiers Qty    05118765346 HC OT THER PROC EA 15 MIN 1/9/2019 Estee Diaz, OT GO 1    41575194267 HC OT THERAPEUTIC ACT EA 15 MIN 1/9/2019 Estee Diaz, OT GO 1    37178231283 HC OT THER PROC EA 15 MIN 1/10/2019 Estee Diaz, OT GO 1    81507167185 HC OT THERAPEUTIC ACT EA 15 MIN 1/10/2019 Estee Diaz, OT GO 1               Estee Diaz OT  1/10/2019

## 2019-01-10 NOTE — PROGRESS NOTES
[facility] Services  PROGRESS NOTE    Patient Name: Val Hill  : 1930  MRN: [id number]    Date of Admission: 2019  Length of Stay: 4  Primary Care Physician: Provider, No Known    Subjective   Subjective     CC:  F/u left hip fracture    HPI:  Was very agitated any this morning and patient was given Ativan.  Currently very drowsy but arousable.  Overall she is comfortable now and denies any pain.    Review of Systems  Unable to obtain secondary to dementia.    Objective   Objective     Vital Signs:   Temp:  [97.6 °F (36.4 °C)-98.3 °F (36.8 °C)] 97.6 °F (36.4 °C)  Heart Rate:  [70-84] 78  Resp:  [16-18] 18  BP: (148-181)/(70-98) 159/90        Physical Exam:  Constitutional: No acute distress, sleeping but arouses  HENT: NCAT, mucous membranes moist  Respiratory: Clear to auscultation bilaterally, respiratory effort normal   Cardiovascular: RRR, no murmurs, rubs, or gallops  Gastrointestinal: Positive bowel sounds, soft, nontender, nondistended  Musculoskeletal: No bilateral ankle edema, pain with passive motion of left leg.  Cast to left wrist.  Psychiatric: Appropriate affect, cooperative  Neurologic: A,A, follows commands.  Skin: No rashes      Results Reviewed:  I have personally reviewed current lab, radiology, and data and agree.    Results from last 7 days   Lab Units  01/10/19   0411  01/09/19   0543  19   0001   19   0625  199  19   WBC 10*3/mm3   --    --    --    --   8.59  13.59*  14.11*   HEMOGLOBIN g/dL  9.6*  7.4*  7.5*   < >  7.5*  10.9*  12.2   HEMATOCRIT %  30.0*  22.9*  23.2*   < >  23.1*  33.8*  37.8   PLATELETS 10*3/mm3   --    --    --    --   260  281  330   INR    --    --    --    --    --   1.10*   --     < > = values in this interval not displayed.     Results from last 7 days   Lab Units  01/10/19   0411  01/08/19   2033  01/08/19   0625  19   0259  19   2018   SODIUM  mmol/L  136   --   136  138   --   137   POTASSIUM mmol/L  3.5  3.8  3.2*  3.5   --   3.9   CHLORIDE mmol/L  100   --   100  105   --   102   CO2 mmol/L  31.0   --   30.0  27.0   --   29.0   BUN mg/dL  5*   --   9  8*   --   10   CREATININE mg/dL  0.42*   --   0.50*  0.59*   --   0.66   GLUCOSE mg/dL  140*   --   100  182*   --   186*   CALCIUM mg/dL  8.3*   --   8.9  8.2*   --   9.5   ALT (SGPT) U/L   --    --   19   --    --   31   AST (SGOT) U/L   --    --   21   --    --   23   TROPONIN I ng/mL   --    --    --    --   0.00   --      Estimated Creatinine Clearance: 35.1 mL/min (A) (by C-G formula based on SCr of 0.42 mg/dL (L)).    No results found for: BNP    Microbiology Results Abnormal     None          Imaging Results (last 24 hours)     ** No results found for the last 24 hours. **          Results for orders placed during the hospital encounter of 11/16/18   Adult Transthoracic Echo Complete W/ Cont if Necessary Per Protocol (With Agitated Saline)    Narrative · Left ventricular systolic function is normal. Estimated EF = 60%.  · Left ventricular diastolic dysfunction (grade I) consistent with   impaired relaxation.  · Mild mitral valve regurgitation is present  · Mild tricuspid valve regurgitation is present.  · Calculated right ventricular systolic pressure from tricuspid   regurgitation is 32 mmHg.  · Negative saline bubble study.          I have reviewed the medications:    Current Facility-Administered Medications:   •  acetaminophen (TYLENOL) tablet 650 mg, 650 mg, Oral, Q4H PRN, Jenny rGoves APRN, 650 mg at 01/10/19 0803  •  artificial tears (LUBRIFRESH P.M.) ophthalmic ointment, , Topical, Q1H PRN, Salome Velazquez APRN  •  aspirin chewable tablet 81 mg, 81 mg, Oral, Daily, Leon Ma MD, 81 mg at 01/10/19 1010  •  atorvastatin (LIPITOR) tablet 40 mg, 40 mg, Oral, Nightly, Jenny Groves APRN, 40 mg at 01/09/19 2050  •  busPIRone (BUSPAR) tablet 5 mg, 5 mg, Oral, TID,  Stefan Chirinos MD, 5 mg at 01/10/19 0755  •  dextrose (D50W) 25 g/ 50mL Intravenous Solution 25 g, 25 g, Intravenous, Q15 Min PRN, Jenny Groves APRN  •  dextrose (GLUTOSE) oral gel 15 g, 15 g, Oral, Q15 Min PRN, Jenny Groves APRN  •  docusate sodium (COLACE) capsule 100 mg, 100 mg, Oral, Daily, Jenny Groves APRN, 100 mg at 01/08/19 0934  •  donepezil (ARICEPT) tablet 20 mg, 20 mg, Oral, Nightly, Jenny Groves APRN, 20 mg at 01/09/19 2050  •  enoxaparin (LOVENOX) syringe 40 mg, 40 mg, Subcutaneous, Q24H, Leon Ma MD, 40 mg at 01/10/19 1010  •  FLUoxetine (PROzac) capsule 20 mg, 20 mg, Oral, Daily, Jenny Groves APRN, 20 mg at 01/10/19 0755  •  glucagon (human recombinant) (GLUCAGEN DIAGNOSTIC) injection 1 mg, 1 mg, Subcutaneous, PRN, Jenny Groves APRN  •  HYDROcodone-acetaminophen (NORCO) 7.5-325 MG per tablet 1 tablet, 1 tablet, Oral, Q4H PRN, Hola Yanez Jr., MD, 1 tablet at 01/10/19 1243  •  insulin lispro (humaLOG) injection 0-7 Units, 0-7 Units, Subcutaneous, 4x Daily With Meals & Nightly, Jenny Groves APRN, 2 Units at 01/10/19 0757  •  lisinopril (PRINIVIL,ZESTRIL) tablet 10 mg, 10 mg, Oral, Q24H, Leon Ma MD, 10 mg at 01/10/19 0754  •  LORazepam (ATIVAN) injection 0.25 mg, 0.25 mg, Intravenous, Q6H PRN, Stefan Chirinos MD, 0.25 mg at 01/10/19 1315  •  Morphine sulfate (PF) injection 2 mg, 2 mg, Intravenous, Q4H PRN, 2 mg at 01/10/19 0011 **AND** naloxone (NARCAN) injection 0.4 mg, 0.4 mg, Intravenous, Q5 Min PRN, Stefan Chirinos MD  •  ondansetron (ZOFRAN) tablet 4 mg, 4 mg, Oral, Q6H PRN **OR** ondansetron (ZOFRAN) injection 4 mg, 4 mg, Intravenous, Q6H PRN, Jenny Groves, APRN  •  potassium chloride (MICRO-K) CR capsule 40 mEq, 40 mEq, Oral, PRN, 40 mEq at 01/08/19 1603 **OR** potassium chloride (KLOR-CON) packet 40 mEq, 40 mEq, Oral, PRN **OR** potassium chloride 10 mEq in 100 mL IVPB, 10 mEq, Intravenous, Q1H PRN,  Leon Ma MD  •  potassium chloride (MICRO-K) CR capsule 10 mEq, 10 mEq, Oral, Daily, Jenny Groevs APRN, 10 mEq at 01/10/19 0755  •  QUEtiapine (SEROquel) tablet 25 mg, 25 mg, Oral, Nightly, Jenny Groves APRN, 25 mg at 01/09/19 2050  •  QUEtiapine (SEROquel) tablet 25 mg, 25 mg, Oral, Q12H PRN, Leon Ma MD, 25 mg at 01/10/19 0756  •  sertraline (ZOLOFT) tablet 25 mg, 25 mg, Oral, Daily, Stefan Chirinos MD, 25 mg at 01/10/19 0756  •  [COMPLETED] Insert peripheral IV, , , Once **AND** sodium chloride 0.9 % flush 10 mL, 10 mL, Intravenous, PRN, Susan Cotter APRN  •  sodium chloride 0.9 % flush 3 mL, 3 mL, Intravenous, Q12H, Jenny Groves APRN, 3 mL at 01/09/19 2050  •  sodium chloride 0.9 % flush 3-10 mL, 3-10 mL, Intravenous, PRN, Jenny Groves APRN  •  Sodium chloride 0.9 % infusion, 75 mL/hr, Intravenous, Continuous, Stefan Chirinos MD, Last Rate: 75 mL/hr at 01/10/19 1014, 75 mL/hr at 01/10/19 1014  •  sucralfate (CARAFATE) tablet 1 g, 1 g, Oral, 4x Daily AC & at Bedtime, Jenny Groves APRN, 1 g at 01/10/19 0754  •  vitamin B-12 (CYANOCOBALAMIN) tablet 1,000 mcg, 1,000 mcg, Oral, Daily, Jenny Groves APRN, 1,000 mcg at 01/10/19 0755      Assessment/Plan   Assessment / Plan     Active Hospital Problems    Diagnosis Date Noted   • **Closed fracture of left femur (CMS/[facility]) [S72.92XA] 01/05/2019   • Chronic diastolic CHF (congestive heart failure) (CMS/[facility]) [I50.32] 01/06/2019   • Gallstones [K80.20] 01/05/2019   • Dementia [F03.90] 11/17/2018   • Type 2 diabetes mellitus (CMS/HCC) [E11.9] 11/17/2018          Brief Hospital Course to date:  Val Hill is a 88 y.o. female with history of dementia, CVA in 11/2018, DM2 who presents with unwitnessed fall and found to have left femur fracture.  Also recent fall with left wrist fracture that was casted by Dr. Erwin.      - left femur fracture, S/P surgery on 1/6/19.    - Second dropping  hemoglobin and hematocrit.  Patient does not have any hematochezia or melena or hematemesis.    - DM    - advanced dementia.    - patient is DNR.  Expect further decline with this event.  +/- palliative eval depending on family wishes and how she does.     PLAN:  - cont current care  - increase seroquel        DVT Prophylaxis:  Mechanical in anticipation possible surgery    Disposition: I expect the patient to be discharged to SNF when medically ready..    CODE STATUS:   Code Status and Medical Interventions:   Ordered at: 01/05/19 2247     Limited Support to NOT Include:    Dialysis    Intubation    Artificial Nutrition     Level Of Support Discussed With:    Next of Kin (If No Surrogate)     Code Status:    No CPR     Medical Interventions (Level of Support Prior to Arrest):    Limited         Electronically signed by Leon Ma MD, 01/10/19, 4:13 PM.

## 2019-01-10 NOTE — PLAN OF CARE
Problem: Patient Care Overview  Goal: Plan of Care Review   01/09/19 1858   Coping/Psychosocial   Plan of Care Reviewed With patient   Plan of Care Review   Progress no change   OTHER   Outcome Summary 87 yo female admitted 1/5/19 after fall resulted in left hip fracture, s/p repair on 1/6/19. Pt up to chair for part of the day, mechanical lift used to go back to bed. Hgb 7.4 this AM, 1 unit RBCs currently infusing. Pt tolerating bites of diet and nectar thick liquids, medications given crushed in applesauce. VSS, NSR on tele box 0448,  incontinent of bowel and bladder, redness noted on bilateral buttocks w/ barrier cream placed.  BM today.  Plan is for pt to go to [facility] tomorrow via ambulance if medically ready.         Problem: Fall Risk (Adult)  Goal: Identify Related Risk Factors and Signs and Symptoms  Outcome: Ongoing (interventions implemented as appropriate)   01/09/19 1858   Fall Risk (Adult)   Related Risk Factors (Fall Risk) confusion/agitation;environment unfamiliar;gait/mobility problems;fear of falling;fatigue/slow reaction;history of falls;depression/anxiety;neuro disease/injury;bladder function altered;age-related changes   Signs and Symptoms (Fall Risk) presence of risk factors       Problem: Skin Injury Risk (Adult)  Goal: Identify Related Risk Factors and Signs and Symptoms  Outcome: Ongoing (interventions implemented as appropriate)   01/09/19 1858   Skin Injury Risk (Adult)   Related Risk Factors (Skin Injury Risk) advanced age;body weight extremes;cognitive impairment;mobility impaired;mechanical forces;fluid intake inadequate;hospitalization prolonged

## 2019-01-11 NOTE — PROGRESS NOTES
Orthopedic Daily Progress Note      CC: JAY overnight. Patient with slight agitation overnight. She is currenlty resting comfortably this PM.    Pain well controlled  General: no fevers, chills  Abdomen: no nausea, vomiting, or diarrhea    No other complaints    Physical Exam:  I have reviewed the vital signs.  Temp:  [97.6 °F (36.4 °C)-98.9 °F (37.2 °C)] 98.9 °F (37.2 °C)  Heart Rate:  [] 74  Resp:  [16-18] 16  BP: (148-186)/() 155/79    Objective  General Appearance:    Alert, cooperative, no distress  Extremities: No clubbing, cyanosis, or edema to lower extremities  Pulses:  2+ in distal surgical extremity  Skin: Dressing Clean/dry/intact      Results Review:    I have reviewed the labs, radiology results and diagnostic studies:Hgb 9.6    Results from last 7 days   Lab Units  01/10/19   0411   01/08/19   0625   WBC 10*3/mm3   --    --   8.59   HEMOGLOBIN g/dL  9.6*   < >  7.5*   PLATELETS 10*3/mm3   --    --   260    < > = values in this interval not displayed.     Results from last 7 days   Lab Units  01/10/19   0411   SODIUM mmol/L  136   POTASSIUM mmol/L  3.5   CO2 mmol/L  31.0   CREATININE mg/dL  0.42*   GLUCOSE mg/dL  140*       I have reviewed the medications.    Assessment/Problem List  POD# 4 Days Post-Op   S/p L TFN for IT femur fracture    Plan  WBAT LLE  PT/OT  Ok for d/c from Ortho standpoint    Discharge Planning: I expect patient to be discharged to   rehab in 1-2 days.    Hola Yanez Jr., MD  01/10/19  10:12 PM

## 2019-01-11 NOTE — PROGRESS NOTES
Continued Stay Note  [facility]     Patient Name: Val Hill  MRN: [id number]  Today's Date: 1/11/2019    Admit Date: 1/5/2019    Discharge Plan     Row Name 01/11/19 1510       Plan    Plan  [facility]    Patient/Family in Agreement with Plan  yes    Plan Comments  Followed up with Marlen at [facility] regarding Ms. Hill's rehab admission.  Per [facility] will have a skilled for Ms. Hill on Monday, 1/14/19.    [facility] ambulance scheduled for Monday at 2pm.    CM will follow up.    Final Discharge Disposition Code  03 - skilled nursing facility (SNF)        Discharge Codes    No documentation.       Expected Discharge Date and Time     Expected Discharge Date Expected Discharge Time    Jan 12, 2019             Ernestine Dennis

## 2019-01-11 NOTE — PLAN OF CARE
Problem: Patient Care Overview  Goal: Plan of Care Review  Outcome: Ongoing (interventions implemented as appropriate)   01/10/19 1105 01/11/19 0336   Coping/Psychosocial   Plan of Care Reviewed With patient --    Plan of Care Review   Progress --  no change   OTHER   Outcome Summary --  Patient restless throughout most of the night. Temporarily restful with PRN medications. Patient yells out frequently, even while sleeping. Repositioned q2h and as necessary to promote comfort and protect skin from breakdown.

## 2019-01-11 NOTE — PROGRESS NOTES
[facility] Medicine Services  PROGRESS NOTE    Patient Name: Val Hill  : 1930  MRN: [id number]    Date of Admission: 2019  Length of Stay: 5  Primary Care Physician: Provider, No Known    Subjective   Subjective     CC:  F/u left hip fracture    HPI:  Had an episode of mono agitation this a.m.  Currently sitting in her bed in no acute distress and calm and cooperative.  Overall she is comfortable now and denies any pain.    Review of Systems  Unable to obtain secondary to dementia.    Objective   Objective     Vital Signs:   Temp:  [96.8 °F (36 °C)-98.9 °F (37.2 °C)] 97.7 °F (36.5 °C)  Heart Rate:  [] 73  Resp:  [16-18] 18  BP: (149-186)/() 153/78        Physical Exam:  Constitutional: No acute distress, sleeping but arouses  HENT: NCAT, mucous membranes moist  Respiratory: Clear to auscultation bilaterally, respiratory effort normal   Cardiovascular: RRR, no murmurs, rubs, or gallops  Gastrointestinal: Positive bowel sounds, soft, nontender, nondistended  Musculoskeletal: No bilateral ankle edema, pain with passive motion of left leg.  Cast to left wrist.  Psychiatric: Appropriate affect, cooperative  Neurologic: A,A, confused but follows commands.  Skin: No rashes      Results Reviewed:  I have personally reviewed current lab, radiology, and data and agree.    Results from last 7 days   Lab Units  01/11/19   0426  01/10/19   0411  01/09/19   0543   19   0259   WBC 10*3/mm3  10.74   --    --    --   8.59  13.59*   HEMOGLOBIN g/dL  10.3*  9.6*  7.4*   < >  7.5*  10.9*   HEMATOCRIT %  32.1*  30.0*  22.9*   < >  23.1*  33.8*   PLATELETS 10*3/mm3  419   --    --    --   260  281   INR    --    --    --    --    --   1.10*    < > = values in this interval not displayed.     Results from last 7 days   Lab Units  01/11/19   0426  01/10/19   0411  01/08/19   2033  1925   19   SODIUM mmol/L  135  136    --   136   < >   --   137   POTASSIUM mmol/L  2.9*  3.5  3.8  3.2*   < >   --   3.9   CHLORIDE mmol/L  99  100   --   100   < >   --   102   CO2 mmol/L  28.0  31.0   --   30.0   < >   --   29.0   BUN mg/dL  5*  5*   --   9   < >   --   10   CREATININE mg/dL  0.39*  0.42*   --   0.50*   < >   --   0.66   GLUCOSE mg/dL  157*  140*   --   100   < >   --   186*   CALCIUM mg/dL  9.0  8.3*   --   8.9   < >   --   9.5   ALT (SGPT) U/L   --    --    --   19   --    --   31   AST (SGOT) U/L   --    --    --   21   --    --   23   TROPONIN I ng/mL   --    --    --    --    --   0.00   --     < > = values in this interval not displayed.     Estimated Creatinine Clearance: 35.1 mL/min (A) (by C-G formula based on SCr of 0.39 mg/dL (L)).    No results found for: BNP    Microbiology Results Abnormal     None          Imaging Results (last 24 hours)     ** No results found for the last 24 hours. **          Results for orders placed during the hospital encounter of 11/16/18   Adult Transthoracic Echo Complete W/ Cont if Necessary Per Protocol (With Agitated Saline)    Narrative · Left ventricular systolic function is normal. Estimated EF = 60%.  · Left ventricular diastolic dysfunction (grade I) consistent with   impaired relaxation.  · Mild mitral valve regurgitation is present  · Mild tricuspid valve regurgitation is present.  · Calculated right ventricular systolic pressure from tricuspid   regurgitation is 32 mmHg.  · Negative saline bubble study.          I have reviewed the medications:    Current Facility-Administered Medications:   •  acetaminophen (TYLENOL) tablet 650 mg, 650 mg, Oral, Q4H PRN, Jenny Groves APRN, 650 mg at 01/10/19 0803  •  artificial tears (LUBRIFRESH P.M.) ophthalmic ointment, , Topical, Q1H PRN, Salome Velazquez APRN  •  aspirin chewable tablet 81 mg, 81 mg, Oral, Daily, Leon Ma MD, 81 mg at 01/11/19 1048  •  atorvastatin (LIPITOR) tablet 40 mg, 40 mg, Oral, Nightly, Germain  Jenny HERNANDEZ APRN, 40 mg at 01/10/19 1946  •  busPIRone (BUSPAR) tablet 5 mg, 5 mg, Oral, TID, Stefan Chirinos MD, 5 mg at 01/11/19 1619  •  dextrose (D50W) 25 g/ 50mL Intravenous Solution 25 g, 25 g, Intravenous, Q15 Min PRN, Jenny Groves, APRN  •  dextrose (GLUTOSE) oral gel 15 g, 15 g, Oral, Q15 Min PRN, Jenny Groves, APRN  •  docusate sodium (COLACE) capsule 100 mg, 100 mg, Oral, Daily, Jenny Groves APRN, 100 mg at 01/11/19 1048  •  donepezil (ARICEPT) tablet 20 mg, 20 mg, Oral, Nightly, Jenny Groves APRN, 20 mg at 01/10/19 1946  •  enalaprilat (VASOTEC) injection 1.25 mg, 1.25 mg, Intravenous, Q6H PRN, Leon Ma MD, 1.25 mg at 01/11/19 0416  •  enoxaparin (LOVENOX) syringe 40 mg, 40 mg, Subcutaneous, Q24H, Leon Ma MD, 40 mg at 01/11/19 1049  •  FLUoxetine (PROzac) capsule 20 mg, 20 mg, Oral, Daily, Jenny Groves APRN, 20 mg at 01/11/19 1049  •  glucagon (human recombinant) (GLUCAGEN DIAGNOSTIC) injection 1 mg, 1 mg, Subcutaneous, PRN, Jenny Groves APRN  •  HYDROcodone-acetaminophen (NORCO) 7.5-325 MG per tablet 1 tablet, 1 tablet, Oral, Q4H PRN, Hola Yanez Jr., MD, 1 tablet at 01/11/19 0214  •  insulin lispro (humaLOG) injection 0-7 Units, 0-7 Units, Subcutaneous, 4x Daily With Meals & Nightly, Jenny Groves, APRN, 2 Units at 01/11/19 1207  •  lisinopril (PRINIVIL,ZESTRIL) tablet 10 mg, 10 mg, Oral, Q24H, Leon Ma MD, 10 mg at 01/11/19 1206  •  LORazepam (ATIVAN) injection 0.25 mg, 0.25 mg, Intravenous, Q6H PRN, Stefan Chirinos MD, 0.25 mg at 01/11/19 1507  •  Morphine sulfate (PF) injection 2 mg, 2 mg, Intravenous, Q4H PRN, 2 mg at 01/11/19 1619 **AND** naloxone (NARCAN) injection 0.4 mg, 0.4 mg, Intravenous, Q5 Min PRN, Stefna Chirinos MD  •  ondansetron (ZOFRAN) tablet 4 mg, 4 mg, Oral, Q6H PRN **OR** ondansetron (ZOFRAN) injection 4 mg, 4 mg, Intravenous, Q6H PRN, Jenny Groves, APRN  •  potassium chloride  (MICRO-K) CR capsule 40 mEq, 40 mEq, Oral, PRN, 40 mEq at 01/08/19 1603 **OR** potassium chloride (KLOR-CON) packet 40 mEq, 40 mEq, Oral, PRN, 40 mEq at 01/11/19 1403 **OR** potassium chloride 10 mEq in 100 mL IVPB, 10 mEq, Intravenous, Q1H PRN, Leon Ma MD  •  potassium chloride (MICRO-K) CR capsule 10 mEq, 10 mEq, Oral, Daily, Jenny Groves APRN, 10 mEq at 01/11/19 1049  •  QUEtiapine (SEROquel) tablet 25 mg, 25 mg, Oral, Q12H PRN, Leon Ma MD, 25 mg at 01/10/19 0756  •  QUEtiapine (SEROquel) tablet 25 mg, 25 mg, Oral, Q12H, Leon Ma MD, 25 mg at 01/11/19 0827  •  sertraline (ZOLOFT) tablet 25 mg, 25 mg, Oral, Daily, Leon Ma MD, 25 mg at 01/11/19 1048  •  [COMPLETED] Insert peripheral IV, , , Once **AND** sodium chloride 0.9 % flush 10 mL, 10 mL, Intravenous, PRN, Susan Cotter APRN  •  sodium chloride 0.9 % flush 3 mL, 3 mL, Intravenous, Q12H, Jenny Groves APRN, 3 mL at 01/10/19 1948  •  sodium chloride 0.9 % flush 3-10 mL, 3-10 mL, Intravenous, PRN, Jenny Groves APRN  •  Sodium chloride 0.9 % infusion, 75 mL/hr, Intravenous, Continuous, Stefan Chirinos MD, Last Rate: 75 mL/hr at 01/11/19 1113, 75 mL/hr at 01/11/19 1113  •  sucralfate (CARAFATE) tablet 1 g, 1 g, Oral, 4x Daily AC & at Bedtime, Jenny Groves APRN, 1 g at 01/11/19 1619  •  vitamin B-12 (CYANOCOBALAMIN) tablet 1,000 mcg, 1,000 mcg, Oral, Daily, Germain Jenny HERNANDEZ, APRN, 1,000 mcg at 01/11/19 1048      Assessment/Plan   Assessment / Plan     Active Hospital Problems    Diagnosis Date Noted   • **Closed fracture of left femur (CMS/[facility]) [S72.92XA] 01/05/2019   • Chronic diastolic CHF (congestive heart failure) (CMS/[facility]) [I50.32] 01/06/2019   • Gallstones [K80.20] 01/05/2019   • Dementia [F03.90] 11/17/2018   • Type 2 diabetes mellitus (CMS/[facility]) [E11.9] 11/17/2018          Brief Hospital Course to date:  Val NICK Hill is a 88 y.o. female with history of dementia, CVA in  11/2018, DM2 who presents with unwitnessed fall and found to have left femur fracture.  Also recent fall with left wrist fracture that was casted by Dr. Erwin.      - left femur fracture, S/P surgery on 1/6/19.    - Second dropping hemoglobin and hematocrit.  Patient does not have any hematochezia or melena or hematemesis.    - DM    - advanced dementia.    - patient is DNR.  Expect further decline with this event.  +/- palliative eval depending on family wishes and how she does.     PLAN:  - Increase Seroquel and anicteric.  - Increase lisinopril and monitor blood pressure.          DVT Prophylaxis:  Mechanical in anticipation possible surgery    Disposition: I expect the patient to be discharged to SNF when medically ready..    CODE STATUS:   Code Status and Medical Interventions:   Ordered at: 01/05/19 0043     Limited Support to NOT Include:    Dialysis    Intubation    Artificial Nutrition     Level Of Support Discussed With:    Next of Kin (If No Surrogate)     Code Status:    No CPR     Medical Interventions (Level of Support Prior to Arrest):    Limited         Electronically signed by Leon Ma MD, 01/11/19, 4:35 PM.

## 2019-01-11 NOTE — PLAN OF CARE
Problem: Patient Care Overview  Goal: Plan of Care Review  Outcome: Ongoing (interventions implemented as appropriate)   01/11/19 2719   Coping/Psychosocial   Plan of Care Reviewed With patient   Plan of Care Review   Progress no change   OTHER   Outcome Summary Patient oriented to person only. Turning patient q 2 hrs. Patient has been restless through out shift, prn ativan given and morphine given for complaints of hip pain. Small amount of bloody drainage to aquacel dressing. Generalized bruising.        Problem: Fall Risk (Adult)  Goal: Identify Related Risk Factors and Signs and Symptoms  Outcome: Ongoing (interventions implemented as appropriate)    Goal: Absence of Fall  Outcome: Ongoing (interventions implemented as appropriate)      Problem: Skin Injury Risk (Adult)  Goal: Identify Related Risk Factors and Signs and Symptoms  Outcome: Ongoing (interventions implemented as appropriate)    Goal: Skin Health and Integrity  Outcome: Ongoing (interventions implemented as appropriate)

## 2019-01-11 NOTE — THERAPY TREATMENT NOTE
Acute Care - Occupational Therapy Treatment Note  [facility]     Patient Name: Val Hill  : 1930  MRN: [id number]  Today's Date: 2019  Onset of Illness/Injury or Date of Surgery: 18  Date of Referral to OT: 18  Referring Physician: MD Beau     Admit Date: 2019       ICD-10-CM ICD-9-CM   1. Closed fracture of left femur, unspecified fracture morphology, unspecified portion of femur, initial encounter (CMS/[facility]) S72.92XA 821.00   2. Dementia without behavioral disturbance, unspecified dementia type F03.90 294.20   3. History of CVA (cerebrovascular accident) Z86.73 V12.54   4. History of diabetes mellitus, type II Z86.39 V12.29   5. Impaired functional mobility, balance, gait, and endurance Z74.09 V49.89   6. Impaired mobility and ADLs Z74.09 799.89     Patient Active Problem List   Diagnosis   • Stroke (CMS/[facility])   • UTI (urinary tract infection)   • Metabolic encephalopathy   • Type 2 diabetes mellitus (CMS/[facility])   • Dementia   • Closed fracture of left femur (CMS/[facility])   • Gallstones   • Chronic diastolic CHF (congestive heart failure) (CMS/[facility])     Past Medical History:   Diagnosis Date   • Dementia    • Diabetes mellitus (CMS/[facility])    • Hyperlipidemia    • Hypertension    • Stroke (CMS/[facility])      Past Surgical History:   Procedure Laterality Date   • ELBOW PROCEDURE     • HIP TROCANTERIC NAILING WITH INTRAMEDULLARY HIP SCREW Left 2019    Procedure: HIP TROCANTERIC NAILING WITH INTRAMEDULLARY HIP SCREW LEFT;  Surgeon: Hola Yanez Jr., MD;  Location: [facility];  Service: Orthopedics   • HYSTERECTOMY         Therapy Treatment    Rehabilitation Treatment Summary     Row Name 19 1408             Treatment Time/Intention    Discipline  occupational therapist  -ST      Document Type  therapy note (daily note)  -ST      Subjective Information  no complaints  -ST      Mode of Treatment  individual therapy;occupational therapy  -ST      Patient/Family Observations  no  family present; exit alarms intact; primafit external cath intact  -ST      Care Plan Review  care plan/treatment goals reviewed;risks/benefits reviewed;current/potential barriers reviewed;patient/other agree to care plan  -ST      Therapy Frequency (OT Eval)  3 times/wk  -ST      Patient Effort  adequate  -ST      Existing Precautions/Restrictions  fall  -ST      Treatment Considerations/Comments  LUE cast; dementia   -ST      Recorded by [ST] Bridgette Huggins OTR 01/11/19 1447      Row Name 01/11/19 1408             Cognitive Assessment/Intervention- PT/OT    Affect/Mental Status (Cognitive)  confused h/o dementia however appropriate throughout session  -ST      Orientation Status (Cognition)  oriented to;person;situation  -ST      Follows Commands (Cognition)  follows one step commands  -ST      Executive Function Deficit (Cognition)  moderate deficit  -ST      Memory Deficit (Cognitive)  mild deficit  -ST      Safety Deficit (Cognitive)  mild deficit  -ST      Personal Safety Interventions  fall prevention program maintained;gait belt;nonskid shoes/slippers when out of bed  -ST      Recorded by [ST] Bridgette Huggins, OTR 01/11/19 1447      Row Name 01/11/19 1408             Safety Issues, Functional Mobility    Safety Issues Affecting Function (Mobility)  impulsivity;insight into deficits/self awareness;judgment;problem solving  -ST      Impairments Affecting Function (Mobility)  balance;cognition;pain;strength  -ST      Recorded by [ST] Bridgette Huggins OTR 01/11/19 1447      Row Name 01/11/19 1408             Mobility Assessment/Intervention    Extremity Weight-bearing Status  left lower extremity  -ST      Left Lower Extremity (Weight-bearing Status)  weight-bearing as tolerated (WBAT)  -ST      Recorded by [ST] Bridgette Huggins OTR 01/11/19 1447      Row Name 01/11/19 1408             Bed Mobility Assessment/Treatment    Sc[place] (Bed Mobility)  maximum assist (25% patient  effort) with sheet to edge of surface  -ST      Supine-Sit [place] (Bed Mobility)  minimum assist (75% patient effort)  -ST      Bed Mobility, Safety Issues  cognitive deficits limit understanding;decreased use of arms for pushing/pulling;decreased use of legs for bridging/pushing;impaired trunk control for bed mobility  -ST      Assistive Device (Bed Mobility)  head of bed elevated;draw sheet;bed rails  -ST      Comment (Bed Mobility)  vc'ing for sequencing task   -ST      Recorded by [ST] Bridgette Huggins, OTR 01/11/19 1447      Row Name 01/11/19 1408             Functional Mobility    Functional Mobility- Comment  u/a   -ST      Recorded by [ST] Bridgette Huggins OTR 01/11/19 1447      Row Name 01/11/19 1408             Transfer Assessment/Treatment    Transfer Assessment/Treatment  stand pivot/stand step transfer  -ST      Comment (Transfers)  BUE support  -ST      Recorded by [ST] Bridgette Huggins OTR 01/11/19 1447      Row Name 01/11/19 1408             Bed-Chair Transfer    Bed-Chair [place] (Transfers)  maximum assist (25% patient effort)  -ST      Assistive Device (Bed-Chair Transfers)  -- BUE support/B knees blocked  -ST      Recorded by [ST] Bridgette Huggins OTR 01/11/19 1447      Row Name 01/11/19 1408             Stand Pivot/Stand Step Transfer    Stand Pivot/Stand Step [place]  maximum assist (25% patient effort)  -ST      Assistive Device (Stand Pivot Stand Step Transfer)  -- BUE support and B knees blocked   -ST      Recorded by [ST] Bridgette Huggins OTR 01/11/19 1447      Row Name 01/11/19 1408             Lower Body Dressing Assessment/Training    Lower Body Dressing [place] Level  don;socks;maximum assist (25% patient effort)  -ST      Lower Body Dressing Position  supine  -ST      Recorded by [ST] Bridgette Huggins OTR 01/11/19 1447      Row Name 01/11/19 1408             Therapeutic Exercise    07163 - OT Therapeutic Activity Minutes  28  -ST      Recorded by [ST]  Bridgette Huggins OTR 01/11/19 1447      Row Name 01/11/19 1408             Therapeutic Exercise    Upper Extremity Range of Motion (Therapeutic Exercise)  wrist flexion/extension, bilateral;forearm supination/pronation, bilateral;elbow flexion/extension, bilateral  -ST      Exercise Type (Therapeutic Exercise)  AROM (active range of motion)  -ST      Position (Therapeutic Exercise)  seated  -ST      Sets/Reps (Therapeutic Exercise)  1X10  -ST      Comment (Therapeutic Exercise)  requires encouragement   -ST      Recorded by [ST] Bridgette Huggins OTR 01/11/19 1447      Row Name 01/11/19 1408             Static Sitting Balance    Level of [place] (Unsupported Sitting, Static Balance)  moderate assist, 50 to 74% patient effort  -ST      Sitting Position (Unsupported Sitting, Static Balance)  sitting on edge of bed  -ST      Time Able to Maintain Position (Unsupported Sitting, Static Balance)  more than 5 minutes  -ST      Comment (Unsupported Sitting, Static Balance)  posterior lean, progressed to min A with cuing, encouraged anterior lean onto BLEs with WSing for improving safety and balance, used reaching of RUE to aid with balance   -ST      Recorded by [ST] Bridgette Huggins OTR 01/11/19 1447      Row Name 01/11/19 1408             Positioning and Restraints    Pre-Treatment Position  in bed  -ST      Post Treatment Position  chair  -ST      In Chair  notified nsg;reclined;call light within reach;encouraged to call for assist;exit alarm on;LUE elevated;LLE elevated;on mechanical lift sling  -ST      Recorded by [ST] Bridgette Huggins OTR 01/11/19 1447      Row Name 01/11/19 1408             Pain Scale: FACES Pre/Post-Treatment    Pain: FACES Scale, Pretreatment  0-->no hurt  -ST      Pain: FACES Scale, Post-Treatment  2-->hurts little bit  -ST      Recorded by [ST] Bridgette Huggins OTR 01/11/19 1447      Row Name                Wound 01/06/19 1407 Left hip incision    Wound - Properties Group Date  first assessed: 01/06/19 [TB] Time first assessed: 1407 [TB] Side: Left [TB] Location: hip [TB] Type: incision [TB] Recorded by:  [TB] Kirti Bishop RN 01/06/19 1407      User Key  (r) = Recorded By, (t) = Taken By, (c) = Cosigned By    Initials Name Effective Dates Discipline     Bridgette Huggins OTR 06/10/18 -  OT    TB Kirti Bishop RN 06/16/16 -  Nurse        Wound 01/06/19 1407 Left hip incision (Active)   Dressing Appearance dried drainage;intact 1/11/2019 10:45 AM   Drainage Characteristics/Odor serosanguineous 1/11/2019 10:45 AM   Drainage Amount small 1/11/2019 10:45 AM   Dressing Care, Wound hydrocolloid 1/10/2019  7:45 PM       Occupational Therapy Education     Title: PT OT SLP Therapies (In Progress)     Topic: Occupational Therapy (In Progress)     Point: ADL training (Done)     Description: Instruct learner(s) on proper safety adaptation and remediation techniques during self care or transfers.   Instruct in proper use of assistive devices.    Learning Progress Summary           Patient Acceptance, E,D, VU,NR by ST at 1/11/2019  2:08 PM    Comment:  bed mobility, transfers, benefits of activity, balance    Acceptance, E, NL,NR by AC at 1/10/2019 11:49 AM    Comment:  benefits of activity    Nonacceptance, E,TB,D, NL by JR at 1/9/2019  7:09 PM    Comment:  pt very confused    Acceptance, E, NR by AC at 1/9/2019 11:34 AM    Comment:  benefits of activity,  sequencing with bed mobility                   Point: Home exercise program (Done)     Description: Instruct learner(s) on appropriate technique for monitoring, assisting and/or progressing therapeutic exercises/activities.    Learning Progress Summary           Patient Acceptance, E,D, VU,NR by ST at 1/11/2019  2:08 PM    Comment:  bed mobility, transfers, benefits of activity, balance    Nonacceptance, E,TB,D, NL by JR at 1/9/2019  7:09 PM    Comment:  pt very confused                   Point: Precautions (In Progress)     Description:  Instruct learner(s) on prescribed precautions during self-care and functional transfers.    Learning Progress Summary           Patient Nonacceptance, E,TB,D, NL by JR at 1/9/2019  7:09 PM    Comment:  pt very confused    Acceptance, E, NR by  at 1/8/2019  2:44 PM    Comment:  Pt educated on role of OT, safety precautions, and appropriate body mechanics.                   Point: Body mechanics (In Progress)     Description: Instruct learner(s) on proper positioning and spine alignment during self-care, functional mobility activities and/or exercises.    Learning Progress Summary           Patient Nonacceptance, E,TB,D, NL by JR at 1/9/2019  7:09 PM    Comment:  pt very confused    Acceptance, E, NR by HK at 1/8/2019  2:44 PM    Comment:  Pt educated on role of OT, safety precautions, and appropriate body mechanics.                               User Key     Initials Effective Dates Name Provider Type Discipline     06/23/15 -  Estee Diaz, OT Occupational Therapist OT     06/10/18 -  Bridgette Huggins OTR Occupational Therapist OT     02/06/17 -  Luis Saldana, RN Registered Nurse Nurse     03/07/18 -  Elena Tapia, OT Occupational Therapist OT                OT Recommendation and Plan  Therapy Frequency (OT Eval): 3 times/wk  Plan of Care Review  Plan of Care Reviewed With: patient  Plan of Care Reviewed With: patient  Outcome Summary: Pt continues to exhibit weakness impacting ability to sit unaided, standing and transfer. Pt min A for supine to sit, mod-min for balance EOB and max/dependent for stand pivot. Pt also w/signficant difficulty completing ADLs. Will continue to follow 3x/week for OT as most appropriate based on participation level and rehab potential.   Outcome Measures     Row Name 01/11/19 1408 01/10/19 1105 01/09/19 1136       How much help from another person do you currently need...    Turning from your back to your side while in flat bed without using bedrails?  --  --  2  -MJ     Moving from lying on back to sitting on the side of a flat bed without bedrails?  --  --  2  -MJ    Moving to and from a bed to a chair (including a wheelchair)?  --  --  2  -MJ    Standing up from a chair using your arms (e.g., wheelchair, bedside chair)?  --  --  2  -MJ    Climbing 3-5 steps with a railing?  --  --  1  -MJ    To walk in hospital room?  --  --  1  -MJ    AM-PAC 6 Clicks Score  --  --  10  -MJ       How much help from another is currently needed...    Putting on and taking off regular lower body clothing?  1  -ST  1  -AC  --    Bathing (including washing, rinsing, and drying)  1  -ST  1  -AC  --    Toileting (which includes using toilet bed pan or urinal)  1  -ST  1  -AC  --    Putting on and taking off regular upper body clothing  2  -ST  2  -AC  --    Taking care of personal grooming (such as brushing teeth)  2  -ST  2  -AC  --    Eating meals  2  -ST  2  -AC  --    Score  9  -ST  9  -AC  --       Functional Assessment    Outcome Measure Options  --  AM-PAC 6 Clicks Daily Activity (OT)  -AC  AM-PAC 6 Clicks Basic Mobility (PT)  -    Row Name 01/09/19 1031             How much help from another is currently needed...    Putting on and taking off regular lower body clothing?  1  -AC      Bathing (including washing, rinsing, and drying)  1  -AC      Toileting (which includes using toilet bed pan or urinal)  1  -AC      Putting on and taking off regular upper body clothing  2  -AC      Taking care of personal grooming (such as brushing teeth)  2  -AC      Eating meals  2  -AC      Score  9  -AC        User Key  (r) = Recorded By, (t) = Taken By, (c) = Cosigned By    Initials Name Provider Type    AC Estee Diaz, OT Occupational Therapist    Bridgette Ramírez, OTR Occupational Therapist    Forrest Singh, PT Physical Therapist           Time Calculation:   Time Calculation- OT     Row Name 01/11/19 9074             Time Calculation- OT    OT Start Time  1408  -ST      OT Received On   01/11/19  -ST      OT Goal Re-Cert Due Date  01/18/19  -ST         Timed Charges    50875 - OT Therapeutic Activity Minutes  28  -ST        User Key  (r) = Recorded By, (t) = Taken By, (c) = Cosigned By    Initials Name Provider Type    Bridgette Ramírez OTR Occupational Therapist           Therapy Suggested Charges     Code   Minutes Charges    75145 (CPT®) Hc Ot Neuromusc Re Education Ea 15 Min      46825 (CPT®) Hc Ot Ther Proc Ea 15 Min      45190 (CPT®) Hc Ot Therapeutic Act Ea 15 Min 28 2    81287 (CPT®) Hc Ot Manual Therapy Ea 15 Min      08762 (CPT®) Hc Ot Iontophoresis Ea 15 Min      00770 (CPT®) Hc Ot Elec Stim Ea-Per 15 Min      07520 (CPT®) Hc Ot Ultrasound Ea 15 Min      97188 (CPT®) Hc Ot Self Care/Mgmt/Train Ea 15 Min      Total  28 2        Therapy Charges for Today     Code Description Service Date Service Provider Modifiers Qty    98163528735 HC OT THERAPEUTIC ACT EA 15 MIN 1/11/2019 Bridgette Huggins OTR GO 2               MAIRA Crawford  1/11/2019

## 2019-01-11 NOTE — PROGRESS NOTES
Orthopedic Daily Progress Note      CC: JAY overnight.    Pain well controlled  General: no fevers, chills  Abdomen: no nausea, vomiting, or diarrhea    No other complaints    Physical Exam:  I have reviewed the vital signs.  Temp:  [96.8 °F (36 °C)-98.9 °F (37.2 °C)] 97.7 °F (36.5 °C)  Heart Rate:  [] 79  Resp:  [16-18] 16  BP: (148-186)/() 170/88    Objective  General Appearance:    Alert, cooperative, no distress  Extremities: No clubbing, cyanosis, or edema to lower extremities  Pulses:  2+ in distal surgical extremity  Skin: Dressing Clean/dry/intact      Results Review:    I have reviewed the labs, radiology results and diagnostic studies:Hgb 10.3    Results from last 7 days   Lab Units  01/11/19   0426   WBC 10*3/mm3  10.74   HEMOGLOBIN g/dL  10.3*   PLATELETS 10*3/mm3  419     Results from last 7 days   Lab Units  01/11/19   0426   SODIUM mmol/L  135   POTASSIUM mmol/L  2.9*   CO2 mmol/L  28.0   CREATININE mg/dL  0.39*   GLUCOSE mg/dL  157*       I have reviewed the medications.    Assessment/Problem List  POD# 5 Days Post-Op   S/p L TFN for IT femur fracture    Plan  WBAT LLE  PT/OT  Ok for d/c from ORtho standpoint      Discharge Planning: I expect patient to be discharged to rehab in 0-1 days.    Hola Yanez Jr., MD  01/11/19  8:39 AM

## 2019-01-11 NOTE — PLAN OF CARE
Problem: Patient Care Overview  Goal: Plan of Care Review  Outcome: Ongoing (interventions implemented as appropriate)   01/11/19 5842   Coping/Psychosocial   Plan of Care Reviewed With patient   OTHER   Outcome Summary Pt continues to exhibit weakness impacting ability to sit unaided, standing and transfer. Pt min A for supine to sit, mod-min for balance EOB and max/dependent for stand pivot. Pt also w/signficant difficulty completing ADLs. Will continue to follow 3x/week for OT as most appropriate based on participation level and rehab potential.

## 2019-01-12 NOTE — THERAPY TREATMENT NOTE
Acute Care - Physical Therapy Treatment Note  [facility]     Patient Name: Val Hill  : 1930  MRN: [id number]  Today's Date: 2019  Onset of Illness/Injury or Date of Surgery: 18  Date of Referral to PT: 19  Referring Physician: MD Beau     Admit Date: 2019    Visit Dx:    ICD-10-CM ICD-9-CM   1. Closed fracture of left femur, unspecified fracture morphology, unspecified portion of femur, initial encounter (CMS/[facility]) S72.92XA 821.00   2. Dementia without behavioral disturbance, unspecified dementia type F03.90 294.20   3. History of CVA (cerebrovascular accident) Z86.73 V12.54   4. History of diabetes mellitus, type II Z86.39 V12.29   5. Impaired functional mobility, balance, gait, and endurance Z74.09 V49.89   6. Impaired mobility and ADLs Z74.09 799.89     Patient Active Problem List   Diagnosis   • Stroke (CMS/[facility])   • UTI (urinary tract infection)   • Metabolic encephalopathy   • Type 2 diabetes mellitus (CMS/[facility])   • Dementia   • Closed fracture of left femur (CMS/[facility])   • Gallstones   • Chronic diastolic CHF (congestive heart failure) (CMS/[facility])       Therapy Treatment    Rehabilitation Treatment Summary     Row Name 19 0840             Treatment Time/Intention    Discipline  physical therapist  -SC      Document Type  therapy note (daily note)  -SC      Subjective Information  complains of;pain  -SC      Mode of Treatment  physical therapy  -SC      Care Plan Review  risks/benefits reviewed  -SC      Therapy Frequency (PT Clinical Impression)  daily  -SC      Patient Effort  good  -SC      Existing Precautions/Restrictions  fall  -SC      Treatment Considerations/Comments  L UE cast  -SC      Patient Response to Treatment  participated   -SC      Recorded by [SC] Yamil Keating, PT 19 3717      Row Name 19 0811             Cognitive Assessment/Intervention- PT/OT    Affect/Mental Status (Cognitive)  confused  -SC      Orientation Status (Cognition)   oriented to;person  -SC      Follows Commands (Cognition)  follows one step commands;75-90% accuracy;increased processing time needed;verbal cues/prompting required;repetition of directions required  -SC      Cognitive Function (Cognitive)  safety deficit  -SC      Personal Safety Interventions  gait belt;fall prevention program maintained  -SC      Recorded by [SC] Yamil Keating, PT 01/12/19 0945      Row Name 01/12/19 0840             Safety Issues, Functional Mobility    Safety Issues Affecting Function (Mobility)  judgment;problem solving;insight into deficits/self awareness  -SC      Impairments Affecting Function (Mobility)  balance;motor control;strength  -SC      Recorded by [SC] Yamil Keating, PT 01/12/19 0945      Row Name 01/12/19 0840             Mobility Assessment/Intervention    Extremity Weight-bearing Status  left lower extremity  -SC      Left Lower Extremity (Weight-bearing Status)  weight-bearing as tolerated (WBAT)  -SC      Recorded by [SC] Yamil Keating, PT 01/12/19 0945      Row Name 01/12/19 0840             Bed Mobility Assessment/Treatment    Bed Mobility Assessment/Treatment  rolling left;rolling right  -SC      Rolling Left [place] (Bed Mobility)  maximum assist (25% patient effort)  -SC      Rolling Right [place] (Bed Mobility)  maximum assist (25% patient effort)  -SC      Comment (Bed Mobility)  doned sling for transfers  -SC      Recorded by [SC] Yamil Keating, PT 01/12/19 0945      Row Name 01/12/19 0840             Transfer Assessment/Treatment    Transfer Assessment/Treatment  bed-chair transfer  -SC      Comment (Transfers)  WORKING ON SIT TO STAND FROM CHAIR WITH CUES FOR SEQUENCING. ABLE TO PULL HERSELF UP INTO STANDING. WORKED ON THIS X2 WITH CUES FOR UPRIGHT POSTURE.   -SC      Recorded by [SC] Yamil Keating, PT 01/12/19 0945      Row Name 01/12/19 0840             Bed-Chair Transfer    Assistive Device (Bed-Chair Transfers)  -- sling up to chair   -SC       Recorded by [SC] Yamil Keating, PT 01/12/19 0945      Row Name 01/12/19 0840             Sit-Stand Transfer    Sit-Stand [place] (Transfers)  moderate assist (50% patient effort);2 person assist;verbal cues  -SC      Assistive Device (Sit-Stand Transfers)  mechanical lift/aid  -SC      Recorded by [SC] Ymail Keating, PT 01/12/19 0945      Row Name 01/12/19 0840             Stand-Sit Transfer    Stand-Sit [place] (Transfers)  moderate assist (50% patient effort)  -SC      Assistive Device (Stand-Sit Transfers)  mechanical lift/aid  -SC      Recorded by [SC] Yamil Keating, PT 01/12/19 0945      Row Name 01/12/19 0840             Gait/Stairs Assessment/Training    13436 - Gait Training Minutes   15  -SC      Gait/Stairs Assessment/Training  gait/ambulation independence  -SC      [place] Level (Gait)  moderate assist (50% patient effort);2 person assist  -SC      Assistive Device (Gait)  -- ARJO  -SC      Distance in Feet (Gait)  3  -SC      Pattern (Gait)  step-through  -SC      Deviations/Abnormal Patterns (Gait)  left sided deviations;stride length decreased;base of support, narrow  -SC      Left Sided Gait Deviations  weight shift ability decreased  -SC      Comment (Gait/Stairs)  VERY NARROW BASE OF SUPPORT WITIH  SOME ASSIST TO ADVANCE LEFT LEG. CUES TO HOLD ONTO HAND RAIL AND UPRIGH POSTURE  -SC      Recorded by [SC] Yamil Keating, PT 01/12/19 0945      Row Name 01/12/19 0840             Therapeutic Exercise    92841 - PT Therapeutic Exercise Minutes  5  -SC      60680 -  PT Neuromuscular Reeducation Minutes  5  -SC      Recorded by [SC] Yamil Keating, PT 01/12/19 0945      Row Name 01/12/19 0840             Therapeutic Exercise    Lower Extremity (Therapeutic Exercise)  heel slides, bilateral;LAQ (long arc quad), bilateral HIP ABD/ADD  -SC      Exercise Type (Therapeutic Exercise)  AAROM (active assistive range of motion);AROM (active range of motion)  -SC      Position (Therapeutic  Exercise)  seated  -SC      Sets/Reps (Therapeutic Exercise)  10  -SC      Recorded by [SC] Yamil Keating, PT 01/12/19 0945      Row Name 01/12/19 0840             Balance    Balance  static sitting balance;static standing balance;dynamic sitting balance;dynamic standing balance  -SC      Recorded by [SC] Yamil Keating, PT 01/12/19 0945      Row Name 01/12/19 0840             Static Sitting Balance    Level of [place] (Unsupported Sitting, Static Balance)  supervision  -SC      Sitting Position (Unsupported Sitting, Static Balance)  sitting in chair  -SC      Time Able to Maintain Position (Unsupported Sitting, Static Balance)  3 to 4 minutes  -SC      Comment (Unsupported Sitting, Static Balance)  CUE FOR FORWARD SHIFTING. SITTING MIDLINE  -SC      Recorded by [SC] Yamil Keating, PT 01/12/19 0945      Row Name 01/12/19 0840             Static Standing Balance    Level of [place] (Supported Standing, Static Balance)  moderate assist, 50 to 74% patient effort  -SC      Time Able to Maintain Position (Supported Standing, Static Balance)  1 to 2 minutes  -SC      Assistive Device Utilized (Supported Standing, Static Balance)  other (see comments) ARJO  -SC      Comment (Supported Standing, Static Balance)  WORKING ON SUSTAINING UPRIGHT POSTURE  -SC      Recorded by [SC] Yamil Keating, PT 01/12/19 0945      Row Name 01/12/19 0840             Dynamic Standing Balance    Level of [place], Reaches Outside Midline (Standing, Dynamic Balance)  moderate assist, 50 to 74% patient effort  -SC      Time Able to Maintain Position, Reaches Outside Midline (Standing, Dynamic Balance)  less than 15 seconds  -SC      Comment, Reaches Outside Midline (Standing, Dynamic Balance)  CUES FOR UPRIGHT POSTURE  -SC      Recorded by [SC] Yamil Keating, PT 01/12/19 0945      Row Name 01/12/19 0840             Positioning and Restraints    Pre-Treatment Position  in bed  -SC      Post Treatment Position  chair  -SC       In Chair  sitting;reclined;notified nsg;call light within reach;encouraged to call for assist  -SC      Recorded by [SC] Yamil Keating, PT 01/12/19 0945      Row Name 01/12/19 0840             Pain Scale: Numbers Pre/Post-Treatment    Pain Location - Side  Left  -SC      Pain Location - Orientation  lower  -SC      Pain Location  hip  -SC      Pain Intervention(s)  Repositioned  -SC      Recorded by [SC] Yamil Keating, PT 01/12/19 0945      Row Name 01/12/19 0840             Pain Scale: FACES Pre/Post-Treatment    Pain: FACES Scale, Pretreatment  0-->no hurt  -SC      Pain: FACES Scale, Post-Treatment  2-->hurts little bit  -SC      Recorded by [SC] Yamil Keating, PT 01/12/19 0945      Row Name                Wound 01/06/19 1407 Left hip incision    Wound - Properties Group Date first assessed: 01/06/19 [TB] Time first assessed: 1407 [TB] Side: Left [TB] Location: hip [TB] Type: incision [TB] Recorded by:  [TB] Kirti Bishop, RN 01/06/19 1407    Row Name 01/12/19 0840             Coping    Observed Emotional State  accepting;calm;cooperative  -SC      Verbalized Emotional State  acceptance  -SC      Recorded by [SC] Yamil Keating, PT 01/12/19 0945      Row Name 01/12/19 0840             Plan of Care Review    Plan of Care Reviewed With  patient  -SC      Recorded by [SC] Yamil Keating, PT 01/12/19 0945      Row Name 01/12/19 0840             Outcome Summary/Treatment Plan (PT)    Daily Summary of Progress (PT)  progress toward functional goals is good  -SC      Recorded by [SC] Yamil Keating, PT 01/12/19 0940        User Key  (r) = Recorded By, (t) = Taken By, (c) = Cosigned By    Initials Name Effective Dates Discipline    SC Yamil Keating, PT 06/19/15 -  PT    TB Kirti Bishop RN 06/16/16 -  Nurse          Wound 01/06/19 1407 Left hip incision (Active)   Dressing Appearance dried drainage;intact 1/12/2019  8:00 AM   Closure Adhesive bandage;JULISA 1/12/2019  8:00 AM   Drainage  Characteristics/Odor serosanguineous 1/11/2019 10:45 AM   Drainage Amount none;other (see comments) 1/11/2019  8:00 PM           Physical Therapy Education     Title: PT OT SLP Therapies (In Progress)     Topic: Physical Therapy (Done)     Point: Mobility training (Done)     Learning Progress Summary           Patient Acceptance, E, VU,DU,NR by SC at 1/12/2019  8:40 AM    Comment:  REVIEWED BENEFITS OF ACTIVITY    Nonacceptance, E,TB,D, NL by  at 1/9/2019  7:09 PM    Comment:  pt very confused    Acceptance, E,D, NR by  at 1/9/2019 11:36 AM    Comment:  Reviewed HEP, benefits of mobility    Acceptance, E,D, NR by  at 1/8/2019  2:10 PM    Comment:  Reviewed HEP, benefits of mobility    Acceptance, E,D, NR by  at 1/7/2019  8:40 AM    Comment:  Edu re: benefits of mobility, WB status                   Point: Home exercise program (Done)     Learning Progress Summary           Patient Acceptance, E, VU,DU,NR by SC at 1/12/2019  8:40 AM    Comment:  REVIEWED BENEFITS OF ACTIVITY    Nonacceptance, E,TB,D, NL by  at 1/9/2019  7:09 PM    Comment:  pt very confused    Acceptance, E,D, NR by  at 1/9/2019 11:36 AM    Comment:  Reviewed HEP, benefits of mobility    Acceptance, E,D, NR by  at 1/8/2019  2:10 PM    Comment:  Reviewed HEP, benefits of mobility                   Point: Body mechanics (Done)     Learning Progress Summary           Patient Acceptance, E, VU,DU,NR by SC at 1/12/2019  8:40 AM    Comment:  REVIEWED BENEFITS OF ACTIVITY    Nonacceptance, E,TB,D, NL by  at 1/9/2019  7:09 PM    Comment:  pt very confused    Acceptance, E,D, NR by  at 1/9/2019 11:36 AM    Comment:  Reviewed HEP, benefits of mobility    Acceptance, E,D, NR by  at 1/8/2019  2:10 PM    Comment:  Reviewed HEP, benefits of mobility    Acceptance, E,D, NR by  at 1/7/2019  8:40 AM    Comment:  Edu re: benefits of mobility, WB status                   Point: Precautions (Done)     Learning Progress Summary           Patient  Acceptance, E, VU,DU,NR by SC at 1/12/2019  8:40 AM    Comment:  REVIEWED BENEFITS OF ACTIVITY    Nonacceptance, E,TB,D, NL by  at 1/9/2019  7:09 PM    Comment:  pt very confused    Acceptance, E,D, NR by  at 1/9/2019 11:36 AM    Comment:  Reviewed HEP, benefits of mobility    Acceptance, E,D, NR by  at 1/8/2019  2:10 PM    Comment:  Reviewed HEP, benefits of mobility    Acceptance, E,D, NR by  at 1/7/2019  8:40 AM    Comment:  Edu re: benefits of mobility, WB status                               User Key     Initials Effective Dates Name Provider Type Discipline    SC 06/19/15 -  Yamil Keating, PT Physical Therapist PT    ALONSO 04/03/18 -  Forrest Doe, PT Physical Therapist PT     02/06/17 -  Luis Saldana RN Registered Nurse Nurse                PT Recommendation and Plan  Therapy Frequency (PT Clinical Impression): daily  Outcome Summary/Treatment Plan (PT)  Daily Summary of Progress (PT): progress toward functional goals is good  Plan of Care Reviewed With: patient  Progress: improving  Outcome Summary: Able to stand and take steps today. Very weak but participating.  Outcome Measures     Row Name 01/12/19 0840 01/11/19 1408 01/10/19 1105       How much help from another person do you currently need...    Turning from your back to your side while in flat bed without using bedrails?  2  -SC  --  --    Moving from lying on back to sitting on the side of a flat bed without bedrails?  2  -SC  --  --    Moving to and from a bed to a chair (including a wheelchair)?  2  -SC  --  --    Standing up from a chair using your arms (e.g., wheelchair, bedside chair)?  2  -SC  --  --    Climbing 3-5 steps with a railing?  1  -SC  --  --    To walk in hospital room?  2  -SC  --  --    AM-PAC 6 Clicks Score  11  -SC  --  --       How much help from another is currently needed...    Putting on and taking off regular lower body clothing?  --  1  -ST  1  -AC    Bathing (including washing, rinsing, and drying)  --  1   -ST  1  -AC    Toileting (which includes using toilet bed pan or urinal)  --  1  -ST  1  -AC    Putting on and taking off regular upper body clothing  --  2  -ST  2  -AC    Taking care of personal grooming (such as brushing teeth)  --  2  -ST  2  -AC    Eating meals  --  2  -ST  2  -AC    Score  --  9  -ST  9  -AC       Functional Assessment    Outcome Measure Options  AM-PAC 6 Clicks Basic Mobility (PT)  -SC  --  AM-PAC 6 Clicks Daily Activity (OT)  -AC    Row Name 01/09/19 1136 01/09/19 1031          How much help from another person do you currently need...    Turning from your back to your side while in flat bed without using bedrails?  2  -MJ  --     Moving from lying on back to sitting on the side of a flat bed without bedrails?  2  -MJ  --     Moving to and from a bed to a chair (including a wheelchair)?  2  -MJ  --     Standing up from a chair using your arms (e.g., wheelchair, bedside chair)?  2  -MJ  --     Climbing 3-5 steps with a railing?  1  -MJ  --     To walk in hospital room?  1  -MJ  --     AM-PAC 6 Clicks Score  10  -MJ  --        How much help from another is currently needed...    Putting on and taking off regular lower body clothing?  --  1  -AC     Bathing (including washing, rinsing, and drying)  --  1  -AC     Toileting (which includes using toilet bed pan or urinal)  --  1  -AC     Putting on and taking off regular upper body clothing  --  2  -AC     Taking care of personal grooming (such as brushing teeth)  --  2  -AC     Eating meals  --  2  -AC     Score  --  9  -AC        Functional Assessment    Outcome Measure Options  AM-PAC 6 Clicks Basic Mobility (PT)  -MJ  --       User Key  (r) = Recorded By, (t) = Taken By, (c) = Cosigned By    Initials Name Provider Type    Yamil Vance, PT Physical Therapist    Estee Chamorro, OT Occupational Therapist    Bridgette Ramírez, OTR Occupational Therapist    Forrest Singh, PT Physical Therapist         Time Calculation:   PT Charges      Row Name 01/12/19 0948 01/12/19 0840          Time Calculation    Start Time  --  0840  -SC     PT Received On  --  01/12/19  -SC     PT Goal Re-Cert Due Date  --  01/17/19  -SC        Time Calculation- PT    Total Timed Code Minutes- PT  25 minute(s)  -SC  --        Timed Charges    16055 - PT Therapeutic Exercise Minutes  --  5  -SC     51443 -  PT Neuromuscular Reeducation Minutes  --  5  -SC     72788 - Gait Training Minutes   --  15  -SC       User Key  (r) = Recorded By, (t) = Taken By, (c) = Cosigned By    Initials Name Provider Type    SC Yamil Keating, PT Physical Therapist        Therapy Suggested Charges     Code   Minutes Charges    97468 (CPT®) Hc Pt Neuromusc Re Education Ea 15 Min 5 1    72867 (CPT®) Hc Pt Ther Proc Ea 15 Min 5     37735 (CPT®) Hc Gait Training Ea 15 Min 15 1    08301 (CPT®) Hc Pt Therapeutic Act Ea 15 Min      12028 (CPT®) Hc Pt Manual Therapy Ea 15 Min      32011 (CPT®) Hc Pt Iontophoresis Ea 15 Min      55875 (CPT®) Hc Pt Elec Stim Ea-Per 15 Min      43450 (CPT®) Hc Pt Ultrasound Ea 15 Min      72484 (CPT®) Hc Pt Self Care/Mgmt/Train Ea 15 Min      11667 (CPT®) Hc Pt Prosthetic (S) Train Initial Encounter, Each 15 Min      93391 (CPT®) Hc Pt Orthotic(S)/Prosthetic(S) Encounter, Each 15 Min      74191 (CPT®) Hc Orthotic(S) Mgmt/Train Initial Encounter, Each 15min      Total  25 2        Therapy Charges for Today     Code Description Service Date Service Provider Modifiers Qty    23166672125 HC PT NEUROMUSC RE EDUCATION EA 15 MIN 1/12/2019 RishabhJose Robertoon A, PT GP 1    39540655592 HC GAIT TRAINING EA 15 MIN 1/12/2019 Yamil Keating A, PT GP 1    80100032219 HC PT THER SUPP EA 15 MIN 1/12/2019 Yamil Keating, PT GP 2          PT G-Codes  Outcome Measure Options: AM-PAC 6 Clicks Basic Mobility (PT)  AM-PAC 6 Clicks Score: 11  Score: 9    Yamil Keating, PT  1/12/2019

## 2019-01-12 NOTE — PLAN OF CARE
Problem: Patient Care Overview  Goal: Plan of Care Review  Outcome: Ongoing (interventions implemented as appropriate)   01/12/19 0840   Coping/Psychosocial   Plan of Care Reviewed With patient   Plan of Care Review   Progress improving   OTHER   Outcome Summary Able to stand and take steps today. Very weak but participating.

## 2019-01-12 NOTE — PLAN OF CARE
Problem: Patient Care Overview  Goal: Plan of Care Review   01/12/19 1612   Coping/Psychosocial   Plan of Care Reviewed With patient;son   Plan of Care Review   Progress no change   OTHER   Outcome Summary 87 yo female admitted 1/5/19 after fall resulted in left hip fracture, s/p repair on 1/6/19. Pt up to chair for part of the day, mechanical lift used to go back to bed. Close monitoring of blood pressure w/ IV prn med given for SBP > 165.  PO BP meds adjusted per MD. Pt tolerating bites of soft diet and nectar thick liquids, medications given crushed in applesauce. VSS, NSR on tele box 6600, incontinent of bowel and bladder, redness noted on bilateral buttocks w/ barrier cream placed. BM 1/10. Plan is for pt to go to [facility] via ambulance if medically ready.        Problem: Fall Risk (Adult)  Goal: Identify Related Risk Factors and Signs and Symptoms  Outcome: Ongoing (interventions implemented as appropriate)   01/12/19 1612   Fall Risk (Adult)   Related Risk Factors (Fall Risk) age-related changes;bladder function altered;confusion/agitation;gait/mobility problems;fear of falling;fatigue/slow reaction;depression/anxiety;history of falls;neuro disease/injury;environment unfamiliar   Signs and Symptoms (Fall Risk) presence of risk factors     Goal: Absence of Fall  Outcome: Ongoing (interventions implemented as appropriate)   01/12/19 1612   Fall Risk (Adult)   Absence of Fall making progress toward outcome       Problem: Skin Injury Risk (Adult)  Goal: Identify Related Risk Factors and Signs and Symptoms  Outcome: Ongoing (interventions implemented as appropriate)   01/12/19 1612   Skin Injury Risk (Adult)   Related Risk Factors (Skin Injury Risk) advanced age;cognitive impairment;hospitalization prolonged;mobility impaired;medication;moisture     Goal: Skin Health and Integrity  Outcome: Ongoing (interventions implemented as appropriate)   01/12/19 1612   Skin Injury Risk (Adult)   Skin Health  and Integrity making progress toward outcome

## 2019-01-12 NOTE — PROGRESS NOTES
[facility] Medicine Services  PROGRESS NOTE    Patient Name: Val Hill  : 1930  MRN: [id number]    Date of Admission: 2019  Length of Stay: 6  Primary Care Physician: Provider, No Known    Subjective   Subjective     CC:  F/u left hip fracture    HPI:  Resting in bed in no acute distress and calm and cooperative today.  Family is at the bedside and a long conversation with them about the patient.  Overall she is comfortable now and denies any pain.    Review of Systems  Unable to obtain secondary to dementia.    Objective   Objective     Vital Signs:   Temp:  [96.1 °F (35.6 °C)-98.1 °F (36.7 °C)] 98.1 °F (36.7 °C)  Heart Rate:  [67-99] 99  Resp:  [16-18] 16  BP: (129-189)/(68-85) 174/85        Physical Exam:  Constitutional: No acute distress, sleeping but arouses  HENT: NCAT, mucous membranes moist  Respiratory: Clear to auscultation bilaterally, respiratory effort normal   Cardiovascular: RRR, no murmurs, rubs, or gallops  Gastrointestinal: Positive bowel sounds, soft, nontender, nondistended  Musculoskeletal: No bilateral ankle edema, pain with passive motion of left leg.  Cast to left wrist.  Psychiatric: Appropriate affect, cooperative  Neurologic: A,A, confused but follows commands.  Skin: No rashes      Results Reviewed:  I have personally reviewed current lab, radiology, and data and agree.    Results from last 7 days   Lab Units  19   1247  01/11/19   0426  01/10/19   0411   01/08/19   0625  01/06/19   0259   WBC 10*3/mm3   --   10.74   --    --   8.59  13.59*   HEMOGLOBIN g/dL  11.0*  10.3*  9.6*   < >  7.5*  10.9*   HEMATOCRIT %  35.5  32.1*  30.0*   < >  23.1*  33.8*   PLATELETS 10*3/mm3   --   419   --    --   260  281   INR    --    --    --    --    --   1.10*    < > = values in this interval not displayed.     Results from last 7 days   Lab Units  19   1823  19   0426  01/10/19   04119/05/19   2023  19    2018   SODIUM mmol/L   --   135  136   --   136   < >   --   137   POTASSIUM mmol/L  3.8  2.9*  3.5   < >  3.2*   < >   --   3.9   CHLORIDE mmol/L   --   99  100   --   100   < >   --   102   CO2 mmol/L   --   28.0  31.0   --   30.0   < >   --   29.0   BUN mg/dL   --   5*  5*   --   9   < >   --   10   CREATININE mg/dL   --   0.39*  0.42*   --   0.50*   < >   --   0.66   GLUCOSE mg/dL   --   157*  140*   --   100   < >   --   186*   CALCIUM mg/dL   --   9.0  8.3*   --   8.9   < >   --   9.5   ALT (SGPT) U/L   --    --    --    --   19   --    --   31   AST (SGOT) U/L   --    --    --    --   21   --    --   23   TROPONIN I ng/mL   --    --    --    --    --    --   0.00   --     < > = values in this interval not displayed.     Estimated Creatinine Clearance: 35.1 mL/min (A) (by C-G formula based on SCr of 0.39 mg/dL (L)).    No results found for: BNP    Microbiology Results Abnormal     None          Imaging Results (last 24 hours)     ** No results found for the last 24 hours. **          Results for orders placed during the hospital encounter of 11/16/18   Adult Transthoracic Echo Complete W/ Cont if Necessary Per Protocol (With Agitated Saline)    Narrative · Left ventricular systolic function is normal. Estimated EF = 60%.  · Left ventricular diastolic dysfunction (grade I) consistent with   impaired relaxation.  · Mild mitral valve regurgitation is present  · Mild tricuspid valve regurgitation is present.  · Calculated right ventricular systolic pressure from tricuspid   regurgitation is 32 mmHg.  · Negative saline bubble study.          I have reviewed the medications:    Current Facility-Administered Medications:   •  acetaminophen (TYLENOL) tablet 650 mg, 650 mg, Oral, Q4H PRN, Jenny Groves APRN, 650 mg at 01/10/19 0803  •  artificial tears (LUBRIFRESH P.M.) ophthalmic ointment, , Topical, Q1H PRN, Salome Velazquez APRN  •  aspirin chewable tablet 81 mg, 81 mg, Oral, Daily, Leon Ma MD, 81  mg at 01/12/19 0850  •  atorvastatin (LIPITOR) tablet 40 mg, 40 mg, Oral, Nightly, Jenny Groves APRN, 40 mg at 01/11/19 2307  •  busPIRone (BUSPAR) tablet 5 mg, 5 mg, Oral, TID, Stefan Chirinos MD, 5 mg at 01/12/19 0850  •  dextrose (D50W) 25 g/ 50mL Intravenous Solution 25 g, 25 g, Intravenous, Q15 Min PRN, Jenny Groves APRN  •  dextrose (GLUTOSE) oral gel 15 g, 15 g, Oral, Q15 Min PRN, Jenny Groves, APRN  •  docusate sodium (COLACE) capsule 100 mg, 100 mg, Oral, Daily, Jenny Groves APRN, 100 mg at 01/11/19 1048  •  donepezil (ARICEPT) tablet 20 mg, 20 mg, Oral, Nightly, Jenny Groves APRN, 20 mg at 01/11/19 2306  •  enalaprilat (VASOTEC) injection 2.5 mg, 2.5 mg, Intravenous, Q6H PRN, Leon Ma MD  •  enoxaparin (LOVENOX) syringe 40 mg, 40 mg, Subcutaneous, Q24H, Leon Ma MD, 40 mg at 01/12/19 0849  •  FLUoxetine (PROzac) capsule 20 mg, 20 mg, Oral, Daily, Jenny Groves APRN, 20 mg at 01/12/19 0850  •  glucagon (human recombinant) (GLUCAGEN DIAGNOSTIC) injection 1 mg, 1 mg, Subcutaneous, PRN, Jenny Groves APRN  •  HYDROcodone-acetaminophen (NORCO) 7.5-325 MG per tablet 1 tablet, 1 tablet, Oral, Q4H PRN, Hola Yanez Jr., MD, 1 tablet at 01/12/19 1200  •  insulin lispro (humaLOG) injection 0-7 Units, 0-7 Units, Subcutaneous, 4x Daily With Meals & Nightly, Jenny Groves APRN, 2 Units at 01/11/19 1207  •  lisinopril (PRINIVIL,ZESTRIL) tablet 30 mg, 30 mg, Oral, Q24H, Leon Ma MD, 30 mg at 01/12/19 0852  •  LORazepam (ATIVAN) injection 0.25 mg, 0.25 mg, Intravenous, Q6H PRN, Stefan Chirinos MD, 0.25 mg at 01/11/19 1507  •  Morphine sulfate (PF) injection 2 mg, 2 mg, Intravenous, Q4H PRN, 2 mg at 01/11/19 1619 **AND** naloxone (NARCAN) injection 0.4 mg, 0.4 mg, Intravenous, Q5 Min PRN, Stefan Chirinos MD  •  ondansetron (ZOFRAN) tablet 4 mg, 4 mg, Oral, Q6H PRN **OR** ondansetron (ZOFRAN) injection 4 mg, 4 mg,  Intravenous, Q6H PRN, Jenny Groves APRN  •  potassium chloride (MICRO-K) CR capsule 40 mEq, 40 mEq, Oral, PRN, 40 mEq at 01/08/19 1603 **OR** potassium chloride (KLOR-CON) packet 40 mEq, 40 mEq, Oral, PRN, 40 mEq at 01/11/19 1403 **OR** potassium chloride 10 mEq in 100 mL IVPB, 10 mEq, Intravenous, Q1H PRN, Leon Ma MD  •  potassium chloride (MICRO-K) CR capsule 10 mEq, 10 mEq, Oral, Daily, Jenny Groves APRN, 10 mEq at 01/12/19 0850  •  QUEtiapine (SEROquel) tablet 25 mg, 25 mg, Oral, Q12H PRN, Leon Ma MD, 25 mg at 01/10/19 0756  •  QUEtiapine (SEROquel) tablet 25 mg, 25 mg, Oral, Q8H, Leon Ma MD  •  sertraline (ZOLOFT) tablet 25 mg, 25 mg, Oral, Daily, Leon Ma MD, 25 mg at 01/12/19 0850  •  [COMPLETED] Insert peripheral IV, , , Once **AND** sodium chloride 0.9 % flush 10 mL, 10 mL, Intravenous, PRN, Susan Cotter APRN  •  sodium chloride 0.9 % flush 3 mL, 3 mL, Intravenous, Q12H, Jenny Groves APRN, 3 mL at 01/11/19 2309  •  sodium chloride 0.9 % flush 3-10 mL, 3-10 mL, Intravenous, PRN, Jenny Groves APRN  •  Sodium chloride 0.9 % infusion, 50 mL/hr, Intravenous, Continuous, Leon Ma MD, Last Rate: 75 mL/hr at 01/12/19 0156, 75 mL/hr at 01/12/19 0156  •  sucralfate (CARAFATE) tablet 1 g, 1 g, Oral, 4x Daily AC & at Bedtime, Jenny Groves APRN, 1 g at 01/12/19 1200  •  vitamin B-12 (CYANOCOBALAMIN) tablet 1,000 mcg, 1,000 mcg, Oral, Daily, Jenny Groves, JOLYNN, 1,000 mcg at 01/12/19 0850      Assessment/Plan   Assessment / Plan     Active Hospital Problems    Diagnosis Date Noted   • **Closed fracture of left femur (CMS/[facility]) [S72.92XA] 01/05/2019   • Chronic diastolic CHF (congestive heart failure) (CMS/[facility]) [I50.32] 01/06/2019   • Gallstones [K80.20] 01/05/2019   • Dementia [F03.90] 11/17/2018   • Type 2 diabetes mellitus (CMS/[facility]) [E11.9] 11/17/2018          Brief Hospital Course to date:  Val Hill is a  88 y.o. female with history of dementia, CVA in 11/2018, DM2 who presents with unwitnessed fall and found to have left femur fracture.  Also recent fall with left wrist fracture that was casted by Dr. Erwin.      - left femur fracture, S/P surgery on 1/6/19.    - Second dropping hemoglobin and hematocrit.  Patient does not have any hematochezia or melena or hematemesis.    - DM    - HTN, not well controlled.    - advanced dementia.    - patient is DNR.       PLAN:  - Increase Seroquel  To 25 mg q 8 hrs.  - Increase lisinopril to 30 mg  - add HCTZ 12.5 mg          DVT Prophylaxis:  Mechanical in anticipation possible surgery    Disposition: I expect the patient to be discharged to SNF when medically ready..    CODE STATUS:   Code Status and Medical Interventions:   Ordered at: 01/05/19 4329     Limited Support to NOT Include:    Dialysis    Intubation    Artificial Nutrition     Level Of Support Discussed With:    Next of Kin (If No Surrogate)     Code Status:    No CPR     Medical Interventions (Level of Support Prior to Arrest):    Limited         Electronically signed by Leon Ma MD, 01/12/19, 2:32 PM.

## 2019-01-12 NOTE — PLAN OF CARE
"Problem: Patient Care Overview  Goal: Plan of Care Review   01/12/19 7748   OTHER   Outcome Summary pt reested/slept for approximately 6hours during the night. still continues to \"yell out\" rather than use call button. patient reoriented to use of call system. very amicable when awake but still confused as to where she is and needs to be reminded of what has happened and why in hospital setting. dressing remains intact       Problem: Fall Risk (Adult)  Goal: Identify Related Risk Factors and Signs and Symptoms  Outcome: Ongoing (interventions implemented as appropriate)    Goal: Absence of Fall  Outcome: Ongoing (interventions implemented as appropriate)      Problem: Skin Injury Risk (Adult)  Goal: Identify Related Risk Factors and Signs and Symptoms  Outcome: Ongoing (interventions implemented as appropriate)    Goal: Skin Health and Integrity  Outcome: Ongoing (interventions implemented as appropriate)        "

## 2019-01-13 NOTE — PLAN OF CARE
Problem: Patient Care Overview  Goal: Plan of Care Review  Outcome: Ongoing (interventions implemented as appropriate)   01/13/19 6535   Coping/Psychosocial   Plan of Care Reviewed With patient   Plan of Care Review   Progress no change   OTHER   Outcome Summary Patient oriented to person only. Continues to yell out commands, redirection multiple times per shift, but pt pleasant. Up with max two assist, dependant on staffs assistance. Patient restless, prn norco and seroquel given. Left hip aquacel in place, small amount of drainage noted. LUE casted from Dr. Erwin prior to admission. Day shift RN tomorrow will need to call Dr. Rosenthal office and ask if he will see the pt as a courtesy f/u because pt has appt on 1/16/19 at his office. Generalized bruising on body. Coccyx red blanches. BM today, pt incontinent of urine, external catheter used. VSS, NSR on monitor. Lisinopril increased from 30mg to 40mg today, PRN vasotec for SBP over 165 available. Plan for rehab at d/c, hopefully tomorrow. [facility] to transport to [facility] tomorrow.

## 2019-01-13 NOTE — PLAN OF CARE
Problem: Patient Care Overview  Goal: Plan of Care Review  Outcome: Ongoing (interventions implemented as appropriate)   01/13/19 0552   Coping/Psychosocial   Plan of Care Reviewed With patient   Plan of Care Review   Progress no change   OTHER   Outcome Summary Patients eyelids crusted shut in AM. requires warm soak to open eyes.

## 2019-01-13 NOTE — PROGRESS NOTES
[facility] Medicine Services  PROGRESS NOTE    Patient Name: Val Hill  : 1930  MRN: [id number]    Date of Admission: 2019  Length of Stay: 7  Primary Care Physician: Provider, No Known    Subjective   Subjective     CC:  F/u left hip fracture    HPI:  Resting in bed in no acute distress but was mildly agitated earlier this AM.  Family is at the bedside .  Overall she is comfortable now and denies any pain.    Review of Systems  Unable to obtain secondary to dementia.    Objective   Objective     Vital Signs:   Temp:  [96.3 °F (35.7 °C)-98.2 °F (36.8 °C)] 97.6 °F (36.4 °C)  Heart Rate:  [73-85] 73  Resp:  [16-18] 16  BP: (107-187)/(53-89) 107/61        Physical Exam:  Constitutional: No acute distress, sleeping but arouses  HENT: NCAT, mucous membranes moist  Respiratory: Clear to auscultation bilaterally, respiratory effort normal   Cardiovascular: RRR, no murmurs, rubs, or gallops  Gastrointestinal: Positive bowel sounds, soft, nontender, nondistended  Musculoskeletal: No bilateral ankle edema, pain with passive motion of left leg.  Cast to left wrist.  Psychiatric: Appropriate affect, cooperative  Neurologic: A,A, confused but follows commands.  Skin: No rashes      Results Reviewed:  I have personally reviewed current lab, radiology, and data and agree.    Results from last 7 days   Lab Units  19   1247  19   0426  01/10/19   0411   01/08/19   0625   WBC 10*3/mm3   --   10.74   --    --   8.59   HEMOGLOBIN g/dL  11.0*  10.3*  9.6*   < >  7.5*   HEMATOCRIT %  35.5  32.1*  30.0*   < >  23.1*   PLATELETS 10*3/mm3   --   419   --    --   260    < > = values in this interval not displayed.     Results from last 7 days   Lab Units  19   1823  19   0426  01/10/19   0411   01/08/19   0625   SODIUM mmol/L   --   135  136   --   136   POTASSIUM mmol/L  3.8  2.9*  3.5   < >  3.2*   CHLORIDE mmol/L   --   99  100   --   100   CO2 mmol/L   --   28.0   31.0   --   30.0   BUN mg/dL   --   5*  5*   --   9   CREATININE mg/dL   --   0.39*  0.42*   --   0.50*   GLUCOSE mg/dL   --   157*  140*   --   100   CALCIUM mg/dL   --   9.0  8.3*   --   8.9   ALT (SGPT) U/L   --    --    --    --   19   AST (SGOT) U/L   --    --    --    --   21    < > = values in this interval not displayed.     Estimated Creatinine Clearance: 35.1 mL/min (A) (by C-G formula based on SCr of 0.39 mg/dL (L)).    No results found for: BNP    Microbiology Results Abnormal     None          Imaging Results (last 24 hours)     ** No results found for the last 24 hours. **          Results for orders placed during the hospital encounter of 11/16/18   Adult Transthoracic Echo Complete W/ Cont if Necessary Per Protocol (With Agitated Saline)    Narrative · Left ventricular systolic function is normal. Estimated EF = 60%.  · Left ventricular diastolic dysfunction (grade I) consistent with   impaired relaxation.  · Mild mitral valve regurgitation is present  · Mild tricuspid valve regurgitation is present.  · Calculated right ventricular systolic pressure from tricuspid   regurgitation is 32 mmHg.  · Negative saline bubble study.          I have reviewed the medications:    Current Facility-Administered Medications:   •  acetaminophen (TYLENOL) tablet 650 mg, 650 mg, Oral, Q4H PRN, Jenny Groves APRN, 650 mg at 01/10/19 0803  •  artificial tears (LUBRIFRESH P.M.) ophthalmic ointment, , Topical, Q1H PRN, Salome Velazquez APRN  •  aspirin chewable tablet 81 mg, 81 mg, Oral, Daily, Leon Ma MD, 81 mg at 01/13/19 0815  •  atorvastatin (LIPITOR) tablet 40 mg, 40 mg, Oral, Nightly, Jenny Groevs APRN, 40 mg at 01/12/19 2140  •  busPIRone (BUSPAR) tablet 5 mg, 5 mg, Oral, TID, Stefan Chirinos MD, 5 mg at 01/13/19 0815  •  dextrose (D50W) 25 g/ 50mL Intravenous Solution 25 g, 25 g, Intravenous, Q15 Min PRN, Jenny Groves, APRN  •  dextrose (GLUTOSE) oral gel 15 g, 15 g, Oral, Q15  Min PRN, Jenny Groves APRN  •  docusate sodium (COLACE) capsule 100 mg, 100 mg, Oral, Daily, Jenny Groves APRN, 100 mg at 01/13/19 0814  •  docusate sodium (COLACE) capsule 100 mg, 100 mg, Oral, BID PRN, Leon Ma MD, 100 mg at 01/13/19 0928  •  donepezil (ARICEPT) tablet 20 mg, 20 mg, Oral, Nightly, Jenny Groves APRN, 20 mg at 01/12/19 2139  •  enalaprilat (VASOTEC) injection 2.5 mg, 2.5 mg, Intravenous, Q6H PRN, Leon Ma MD, 2.5 mg at 01/12/19 1528  •  enoxaparin (LOVENOX) syringe 40 mg, 40 mg, Subcutaneous, Q24H, Leon Ma MD, 40 mg at 01/13/19 0813  •  FLUoxetine (PROzac) capsule 20 mg, 20 mg, Oral, Daily, Jenny Groves APRN, 20 mg at 01/13/19 0815  •  glucagon (human recombinant) (GLUCAGEN DIAGNOSTIC) injection 1 mg, 1 mg, Subcutaneous, PRN, Jenny Groves APRN  •  hydrochlorothiazide (HYDRODIURIL) tablet 12.5 mg, 12.5 mg, Oral, Daily, Leon Ma MD, 12.5 mg at 01/13/19 0814  •  HYDROcodone-acetaminophen (NORCO) 7.5-325 MG per tablet 1 tablet, 1 tablet, Oral, Q4H PRN, Hola Yanez Jr., MD, 1 tablet at 01/13/19 0909  •  insulin lispro (humaLOG) injection 0-7 Units, 0-7 Units, Subcutaneous, 4x Daily With Meals & Nightly, Jenny Groves APRN, 2 Units at 01/13/19 1211  •  lisinopril (PRINIVIL,ZESTRIL) tablet 40 mg, 40 mg, Oral, Q24H, Leon Ma MD, 40 mg at 01/13/19 0814  •  LORazepam (ATIVAN) injection 0.25 mg, 0.25 mg, Intravenous, Q6H PRN, Stefan Chirinos MD, 0.25 mg at 01/11/19 1507  •  magnesium hydroxide (MILK OF MAGNESIA) suspension 2400 mg/10mL 10 mL, 10 mL, Oral, Daily PRN, Leon Ma MD, 10 mL at 01/13/19 0955  •  Morphine sulfate (PF) injection 2 mg, 2 mg, Intravenous, Q4H PRN, 2 mg at 01/11/19 1619 **AND** naloxone (NARCAN) injection 0.4 mg, 0.4 mg, Intravenous, Q5 Min PRN, Stefan Chirinos MD  •  ondansetron (ZOFRAN) tablet 4 mg, 4 mg, Oral, Q6H PRN **OR** ondansetron (ZOFRAN) injection 4 mg, 4 mg,  Intravenous, Q6H PRN, Jenny Groves APRN  •  potassium chloride (MICRO-K) CR capsule 40 mEq, 40 mEq, Oral, PRN, 40 mEq at 01/08/19 1603 **OR** potassium chloride (KLOR-CON) packet 40 mEq, 40 mEq, Oral, PRN, 40 mEq at 01/11/19 1403 **OR** potassium chloride 10 mEq in 100 mL IVPB, 10 mEq, Intravenous, Q1H PRN, Leon Ma MD  •  potassium chloride (MICRO-K) CR capsule 10 mEq, 10 mEq, Oral, Daily, Jenny Groves APRN, 10 mEq at 01/13/19 0814  •  QUEtiapine (SEROquel) tablet 25 mg, 25 mg, Oral, Q12H PRN, Leon Ma MD, 25 mg at 01/13/19 0943  •  QUEtiapine (SEROquel) tablet 25 mg, 25 mg, Oral, Q8H, Leon Ma MD, 25 mg at 01/13/19 0628  •  sertraline (ZOLOFT) tablet 25 mg, 25 mg, Oral, Daily, Leon Ma MD, 25 mg at 01/13/19 0815  •  [COMPLETED] Insert peripheral IV, , , Once **AND** sodium chloride 0.9 % flush 10 mL, 10 mL, Intravenous, PRN, Susan Cotter APRN  •  sodium chloride 0.9 % flush 3 mL, 3 mL, Intravenous, Q12H, Jenny Groves APRN, 3 mL at 01/12/19 2140  •  sodium chloride 0.9 % flush 3-10 mL, 3-10 mL, Intravenous, PRN, Jenny Groves APRN  •  Sodium chloride 0.9 % infusion, 50 mL/hr, Intravenous, Continuous, Leon Ma MD, Last Rate: 50 mL/hr at 01/13/19 0846, 50 mL/hr at 01/13/19 0846  •  sucralfate (CARAFATE) tablet 1 g, 1 g, Oral, 4x Daily AC & at Bedtime, Jenny Groves APRN, 1 g at 01/13/19 1211  •  vitamin B-12 (CYANOCOBALAMIN) tablet 1,000 mcg, 1,000 mcg, Oral, Daily, Jenny Groves APRN, 1,000 mcg at 01/13/19 0814      Assessment/Plan   Assessment / Plan     Active Hospital Problems    Diagnosis Date Noted   • **Closed fracture of left femur (CMS/[facility]) [S72.92XA] 01/05/2019   • Chronic diastolic CHF (congestive heart failure) (CMS/[facility]) [I50.32] 01/06/2019   • Gallstones [K80.20] 01/05/2019   • Dementia [F03.90] 11/17/2018   • Type 2 diabetes mellitus (CMS/[facility]) [E11.9] 11/17/2018          Brief Hospital Course to  date:  Val Hill is a 88 y.o. female with history of dementia, CVA in 11/2018, DM2 who presents with unwitnessed fall and found to have left femur fracture.  Also recent fall with left wrist fracture that was casted by Dr. Erwin.      - left femur fracture, S/P surgery on 1/6/19.    - Second dropping hemoglobin and hematocrit.  Patient does not have any hematochezia or melena or hematemesis.    - DM    - HTN, not well controlled.    - advanced dementia.    - patient is DNR.       PLAN:  - Increase the night dose of Seroquel  To 50 mg  - Increase lisinopril to 30 mg  - add HCTZ 12.5 mg          DVT Prophylaxis:  Mechanical in anticipation possible surgery    Disposition: I expect the patient to be discharged to SNF when medically ready..    CODE STATUS:   Code Status and Medical Interventions:   Ordered at: 01/05/19 4028     Limited Support to NOT Include:    Dialysis    Intubation    Artificial Nutrition     Level Of Support Discussed With:    Next of Kin (If No Surrogate)     Code Status:    No CPR     Medical Interventions (Level of Support Prior to Arrest):    Limited         Electronically signed by Leon Ma MD, 01/13/19, 12:36 PM.

## 2019-01-13 NOTE — PROGRESS NOTES
Orthopedic Daily Progress Note      CC: JAY overnight.    Pain well controlled  General: no fevers, chills  Abdomen: no nausea, vomiting, or diarrhea    No other complaints    Physical Exam:  I have reviewed the vital signs.  Temp:  [96.3 °F (35.7 °C)-98.2 °F (36.8 °C)] 96.3 °F (35.7 °C)  Heart Rate:  [79-99] 85  Resp:  [16-18] 16  BP: (110-187)/(53-89) 110/53    Objective  General Appearance:    Alert, cooperative, no distress  Extremities: No clubbing, cyanosis, or edema to lower extremities  Pulses:  2+ in distal surgical extremity  Skin: Dressing Clean/dry/intact      Results Review:    I have reviewed the labs, radiology results and diagnostic studies:no new labs    Results from last 7 days   Lab Units  01/12/19   1247  01/11/19   0426   WBC 10*3/mm3   --   10.74   HEMOGLOBIN g/dL  11.0*  10.3*   PLATELETS 10*3/mm3   --   419     Results from last 7 days   Lab Units  01/11/19   1823  01/11/19   0426   SODIUM mmol/L   --   135   POTASSIUM mmol/L  3.8  2.9*   CO2 mmol/L   --   28.0   CREATININE mg/dL   --   0.39*   GLUCOSE mg/dL   --   157*       I have reviewed the medications.    Assessment/Problem List  POD# 7 Days Post-Op   S/p L TFN    Plan  WBAT LLE  PT/OT  Ok for d/c from Ortho standpoint      Discharge Planning: I expect patient to be discharged to rehab in TBD days.    Hola Yanez Jr., MD  01/13/19  11:13 AM

## 2019-01-13 NOTE — NURSING NOTE
Pt has 2 week F/U with Dr. Erwin's office on 1/16/19, Wednesday, for her LUE cast to obtain xrays according to his note under chart review on 12/27/18. I mentioned this to Dr. Hanna, the ortho on call, he suggested to call Dr. Erwin in AM for him to f/u. I then called the paging service to leave a VM with Dr. Rosenthal office, and they weren't able to do so and suggested we call him tomorrow as well for a courtesy f/u inpatient. Pt is supposed to d/c Monday, tomorrow 1/14/19, to SNF via ambulance. Son, Tapan, requests pt be seen while she's in the hospital, as transportation will be an issue once discharged.     Last

## 2019-01-13 NOTE — PLAN OF CARE
Problem: Patient Care Overview  Goal: Plan of Care Review  Outcome: Ongoing (interventions implemented as appropriate)   01/13/19 1046   Coping/Psychosocial   Plan of Care Reviewed With patient   Plan of Care Review   Progress no change   OTHER   Outcome Summary Patient able to stand from chair 4 times, max assist x2 to do so, but unable to achieve adequate static standing balance to attempt to take steps from chair. Balance and posture improved with each stand and was able to stand longer with each stand. Patient oriented to self but remains confused to place/time/situation. Will continue to progress mobility training and strengthening as able.

## 2019-01-14 NOTE — PROGRESS NOTES
"                  Clinical Nutrition     Nutrition Assessment  Reason for Visit:   Follow-up protocol      Patient Name: Val Hill  YOB: 1930  MRN: [id number]  Date of Encounter: 01/14/19 10:56 AM  Admission date: 1/5/2019    Note patient continues with poor PO intake and preference for \"no artificial nutrition\" in interventions information.    Per nsg documentation patient did not eat dinner last night but ate 100% Boost Pudding; only picked at food this morning per PCT.    Note possible discharge this afternoon.      Nutrition Assessment   Assessment       Hospital Problem List    Closed fracture of left femur (CMS/[facility])    Type 2 diabetes mellitus (CMS/[facility])    Dementia    Gallstones    Chronic diastolic CHF (congestive heart failure) (CMS/[facility])    Severe, Chronic Malnutrition (dx per RD 1/8)    Applicable procedures/tests during this admission:  (1/6) s/p left hip trochanteric nailing with intramedullary hip screw      Applicable PMH:  -HLD  -HTN  -Hx of CVA (11/2018)      Reported/Observed/Food/Nutrition Related History:   RN reports she is unsure of patient's PO intake lately. RD spoke with PCT who reports patient only picked at breakfast tray this morning, no Boost supplement intake.      Anthropometrics     Height: 170.2 cm (67\")  Last filed wt: Weight: 45.7 kg (100 lb 12 oz) (01/05/19 1830) - no method specified       BMI: BMI (Calculated): 15.8  Underweight:<18.5kg/m2    Ideal Body Weight (IBW) (kg): 61.86  Admission wt: 100 lb 12 oz  Method obtained: unknown method (1/5)    Unable to determine patient's UBW    Weight change: RD notes patient weighed 115 lbs at MD office visit 12/27 per EMR. RD cannot determine accurate weight loss status at this point given current admission wt method not specified and patient unable to stand. RD obtained bedscale weight at visit, but inaccurately read 140 lbs.      Labs reviewed   Reviewed: Yes    Medications reviewed   Reviewed: Yes    Current " Nutrition Prescription     PO: Diet Regular; Nectar / Syrup Thick; Cardiac, Consistent Carbohydrate  Oral Nutrition Supplements: Boost GC daily, Boost pudding 2x daily    Intake: 13% / 7 meals      Nutrition Diagnosis     1/6  Problem Underweight   Etiology dementia   Signs/Symptoms BMI=15.78   Status: ongoing    1/8  Problem Inadequate oral intake   Etiology Clinical condition   Signs/Symptoms PO intake: 13% / 7 meals   Status: ongoing, revised 1/14 1/8  Problem Malnutrition (severe, chronic)   Etiology Clinical condition   Signs/Symptoms Severe muscle wasting (temporalis, clavicle, deltoid, quadriceps); severe fat loss (orbital)   Status: ongoing      Nutrition Intervention   1.  Follow treatment progress, Care plan reviewed, Encourage intake       Goal:   General: Nutrition support treatment  PO: Increase intake      Monitoring/Evaluation:   Per protocol, PO intake, Supplement intake, Weight    Will Continue to follow per protocol  Vicky Sandoval RD  Time Spent: 20 min

## 2019-01-14 NOTE — PLAN OF CARE
Problem: Patient Care Overview  Goal: Plan of Care Review  PT rests in bed without sx/si of pain/acute distress. Resp even/unlabored. Skin w/d to touch. PT is A&O only to self. No c/o pain/discomfort reported during the shift. SR, VS WNL. Frequent rounds by nursing staff in place. Will cont tomx. Call light in reach   01/14/19 6266   Coping/Psychosocial   Plan of Care Reviewed With patient   Plan of Care Review   Progress no change

## 2019-01-14 NOTE — PLAN OF CARE
Problem: Patient Care Overview  Goal: Plan of Care Review  Outcome: Outcome(s) achieved Date Met: 01/14/19 01/14/19 1010   Coping/Psychosocial   Plan of Care Reviewed With patient   Plan of Care Review   Progress no change   OTHER   Outcome Summary [facility] nurse consulted for possible DTPI to coccyx. Assessment performed. At this time patient presents with mild MASD with fungal rash on perirectal region and posterior bilateral groin. Area is not a DTPI. Applied barrier cream and dry flow pads in place. Will order MICOTIN cream for fungal rash. Please see order. Patient in on waffle mattress, turns well, and has a west of 17. Does not need specialty mattress at this time. Will continue to follow. Please contact if needs arise. Thanks

## 2019-01-14 NOTE — PROGRESS NOTES
Case Management Discharge Note    Final Note: Ms. Hill has a skilled bed at [facility] today, if medically ready.  Notified Ms. Hill's son, Tapan, by phone.  [facility] ambulance scheduled for today at 2pm.  PCS form on the chart.  No EMS DNR.  Please call report to [facility] at [phone].  [facility] has EPIC access for DC summary.  Please have a copy of the DC summary, AVS and any scripts in the discharge packet.  Thank you.    Destination - Selection Complete      Service Provider Request Status Selected Services Address Phone Number Fax Number    [facility] Selected Skilled Nursing [address] [facility] [id number] [phone]      Durable Medical Equipment      No service has been selected for the patient.      Dialysis/Infusion      No service has been selected for the patient.      Home Medical Care      No service has been selected for the patient.      Community Resources      No service has been selected for the patient.        [facility]    Final Discharge Disposition Code: 03 - skilled nursing facility (SNF)

## 2019-01-14 NOTE — DISCHARGE SUMMARY
[facility] Medicine Services  DISCHARGE SUMMARY    Patient Name: Val Hill  : 1930  MRN: [id number]    Date of Admission: 2019  Date of Discharge:  2019  Primary Care Physician: Provider, No Known    Consults     Date and Time Order Name Status Description    2019 0030 Inpatient Orthopedic Surgery Consult Completed           Hospital Course     Presenting Problem:   Closed fracture of left femur, unspecified fracture morphology, unspecified portion of femur, initial encounter (CMS/[facility]) [S72.92XA]    Active Hospital Problems    Diagnosis Date Noted   • **Closed fracture of left femur (CMS/[facility]) [S72.92XA] 2019   • Chronic diastolic CHF (congestive heart failure) (CMS/[facility]) [I50.32] 2019   • Gallstones [K80.20] 2019   • Dementia [F03.90] 2018   • Type 2 diabetes mellitus (CMS/[facility]) [E11.9] 2018      Resolved Hospital Problems   No resolved problems to display.          Hospital Course:  Val Hill is a 88 y.o. female with dementia, HTN, DM2, recent fall with left wrist fracture followed by Dr. Erwin who presented to the ED from [facility] after an unwitnessed fall.  She was found to have left femur fracture.  She was taken to the OR by Dr. Yanez 2019 for trochanteric nailing with intramedullary hip screw.  She did have postoperative anemia requiring one unit blood transfusion with subsequent stable hemoglobin.  She should remain on DVT prophylaxis with lovenox for an additional 10 days (to complete 14 days) then transition to ASA 325mg daily x 4 weeks.  She needs to follow up with Dr. Yanez in clinic within 2 weeks and may need her follow up with Dr. Erwin for left wrist fracture rescheduled or keep 2019 appointment.      Discharge Follow Up Recommendations for labs/diagnostics:  DVT prophylaxis with lovenox x 10 additional days followed by ASA 325mg daily x 4 weeks  Follow up with Dr. Yanez within 2  weeks.    Day of Discharge     HPI: Feels well today.  Occasional pain in her hip but well controlled.    Review of Systems  Gen- No fevers, chills  CV- No chest pain, palpitations  Resp- No cough, dyspnea  GI- No N/V/D, abd pain    Otherwise ROS is negative except as mentioned in the HPI.    Vital Signs:   Temp:  [97.6 °F (36.4 °C)-98.6 °F (37 °C)] 98.6 °F (37 °C)  Heart Rate:  [67-88] 76  Resp:  [14-16] 16  BP: (131-191)/(59-92) 155/67     Physical Exam:  Constitutional: No acute distress, awake, alert, sitting up in bed  Eyes: PERRLA, sclerae anicteric, no conjunctival injection  HENT: NCAT, mucous membranes moist  Neck: Supple, trachea midline  Respiratory: Clear to auscultation bilaterally, nonlabored respirations   Cardiovascular: RRR, no murmurs, rubs, or gallops, palpable pedal pulses bilaterally  Gastrointestinal: Positive bowel sounds, soft, nontender, nondistended  Musculoskeletal: No bilateral ankle edema, left wrist in cast  Psychiatric: Appropriate affect, cooperative, pleasantly confused  Neurologic: Cranial Nerves grossly intact to confrontation, speech clear  Skin: No rashes    Pertinent  and/or Most Recent Results     Results from last 7 days   Lab Units  01/12/19   1247  01/11/19   1823  01/11/19   0426  01/10/19   0411  01/09/19   0543  01/09/19   0001  01/08/19   2033  01/08/19   1235  01/08/19   0625   WBC 10*3/mm3   --    --   10.74   --    --    --    --    --   8.59   HEMOGLOBIN g/dL  11.0*   --   10.3*  9.6*  7.4*  7.5*  8.5*  7.7*  7.5*   HEMATOCRIT %  35.5   --   32.1*  30.0*  22.9*  23.2*  26.0*  23.8*  23.1*   PLATELETS 10*3/mm3   --    --   419   --    --    --    --    --   260   SODIUM mmol/L   --    --   135  136   --    --    --    --   136   POTASSIUM mmol/L   --   3.8  2.9*  3.5   --    --   3.8   --   3.2*   CHLORIDE mmol/L   --    --   99  100   --    --    --    --   100   CO2 mmol/L   --    --   28.0  31.0   --    --    --    --   30.0   BUN mg/dL   --    --   5*  5*   --     --    --    --   9   CREATININE mg/dL   --    --   0.39*  0.42*   --    --    --    --   0.50*   GLUCOSE mg/dL   --    --   157*  140*   --    --    --    --   100   CALCIUM mg/dL   --    --   9.0  8.3*   --    --    --    --   8.9     Results from last 7 days   Lab Units  01/08/19   0625   BILIRUBIN mg/dL  0.3   ALK PHOS U/L  82   ALT (SGPT) U/L  19   AST (SGOT) U/L  21           Invalid input(s): TG, LDLCALC, LDLREALC        Brief Urine Lab Results  (Last result in the past 365 days)      Color   Clarity   Blood   Leuk Est   Nitrite   Protein   CREAT   Urine HCG        01/05/19 2238 Yellow Clear Negative Negative Negative Negative               Microbiology Results Abnormal     None          Imaging Results (all)     Procedure Component Value Units Date/Time    CT Lower Extremity Left Without Contrast [[id number]] Collected:  01/08/19 1546     Updated:  01/08/19 1549    Narrative:       EXAMINATION: CT LOWER EXTREMITY, LEFT, WO CONTRAST-01/08/2019:      INDICATION: Injury of muscle, fascia and tendon at hip and thigh level.     TECHNIQUE:  Axial CT data of the left lower extremity (femur) were  acquired helically without contrast. Multiplanar reformatted images were  generated and reviewed.      The radiation dose reduction device was turned on for each scan per the  ALARA (As Low as Reasonably Achievable) protocol     COMPARISONS:  Radiograph 01/05/2019.     FINDINGS:  Interval operative fixation of the intertrochanteric left  femur fracture. Expected position of hardware and bones identified.  There is expected soft tissue emphysema and swelling. Small soft tissue  hematoma is seen deep to the skin closure staples. Colonic  diverticulosis incidentally noted.       Impression:       Postop ORIF left hip. Small hematoma deep to the inferior  margin of the skin closure staples.     D:  01/08/2019  E:  01/08/2019           This report was finalized on 1/8/2019 3:47 PM by Vin Johnston.       FL C Arm During Surgery  [[id number]] Collected:  01/06/19 1518     Updated:  01/07/19 1413    Narrative:       EXAMINATION: FL C ARM DURING SURGERY - 1/6/2019     INDICATION:  S72.92XA-Unspecified fracture of left femur, initial  encounter for closed fracture; F03.90-Unspecified dementia without  behavioral disturbance; Z86.73-Personal history of transient ischemic  attack (TIA), and cerebral infarction without residual deficits;  Z86.39-Personal history of other endocrine, nutritional and metabolic  disease. Left hip pain.     COMPARISON: NONE     FINDINGS: 52 seconds of fluoroscopy and 3 images used for nailing of the  left hip.  Please see the procedure report for full details.       Impression:       Fluoroscopy for nailing of the left hip.     DICTATED:   1/6/2019  EDITED/ls :   1/6/2019          This report was finalized on 1/7/2019 2:11 PM by Dr. Paula Crow MD.       XR Hip With or Without Pelvis 2 - 3 View Left [[id number]] Collected:  01/05/19 2157     Updated:  01/06/19 0003    Narrative:       EXAM:    XR Left Hip with Pelvis when Performed, 2 or 3 Views     EXAM DATE/TIME:    1/5/2019 9:57 PM     CLINICAL HISTORY:    88 years old, female; Unspecified dementia without behavioral disturbance;   Unspecified fracture of left femur, initial encounter for closed fracture;   Personal history of other endocrine, nutritional and metabolic disease;   Personal history of transient ischemic attack (tia), and cerebral infarction   without residual deficits; Pain; Hip pain; Left hip; Additional info: Need true   ap pelvis with internal rotation and lateral of l hip for operative planning     TECHNIQUE:    XR Left hip with pelvis when performed, 2 or 3 views     COMPARISON:    CT ABDOMEN PELVIS WO CONTRAST 1/5/2019 8:28 PM     FINDINGS:    Bones/joints:  No change in alignment of the acute displaced and comminuted   intertrochanteric fracture of the left femur. Mild osteoarthrosis of both hips,   osteopenia, degenerative spondylosis of  the spine, and transitional anatomy of   the lumbosacral junction are again noted.    Soft tissues:  Soft tissue swelling of the left hip.       Impression:       No change in alignment of the acute displaced and comminuted intertrochanteric   fracture the left femur.     THIS DOCUMENT HAS BEEN ELECTRONICALLY SIGNED BY CRISTAL LEVINE MD    CT Abdomen Pelvis Without Contrast [[id number]] Collected:  01/05/19 2029     Updated:  01/05/19 2140    Narrative:       EXAM:    CT Abdomen and Pelvis Without Contrast     EXAM DATE/TIME:    1/5/2019 8:29 PM     CLINICAL HISTORY:    88 years old, female; Injury or trauma; Fall; Initial encounter;   Concussion/head injury; Additional info: Demented elder with HX of unwitnessed   fall     TECHNIQUE:    Axial computed tomography images of the abdomen and pelvis without contrast.    All CT scans at this facility use at least one of these dose optimization   techniques: automated exposure control; mA and/or kV adjustment per patient   size (includes targeted exams where dose is matched to clinical indication); or   iterative reconstruction.    Coronal and sagittal reformatted images were created and reviewed.     COMPARISON:    No comparison abdominal and pelvic imaging studies are available.    FINDINGS:   Please note: Sensitivity for the detection of traumatic injury is suboptimal   given the absence of contrast enhancement. Some overlying artifact from the   patient's  arm positioning. Otherwise:    Liver: Unremarkable.     Gallbladder: A few small calcified gallstones without definite evidence of   acute cholecystitis.     Spleen: Unremarkable.     Pancreas: Unremarkable.     Adrenal glands: Unremarkable right adrenal gland. Relatively prominent,   perhaps hyperplastic left adrenal gland.    Kidneys and urinary bladder: No urinary tract stones. No hydronephrosis or   acute kidney inflammation bilaterally. Grossly unremarkable urinary bladder.     Uterus and adnexa: No well-defined  adnexal mass is seen bilaterally. Status   post hysterectomy.     Gastrointestinal tract: The stomach, small bowel, and colon are variably   opacified or decompressed but otherwise no definite acute obstruction or   inflammation is seen.  Relative prominence of the walls of some segments of the   stomach, small bowel, and colon may reflect lack of distention rather than   inflammation. At least sigmoid colon diverticula without acute diverticulitis.   No acute appendicitis.    Fluid: No free fluid, or acute hematoma or abscess.     Gas: No free air.     Lymph nodes: No definite pathologic lymph node enlargement is seen.     Aorta: Nonaneurysmal abdominal aorta. No periaortic fluid. Aortic and branch   arterial calcified plaques.      Abdominal wall: There appears to be a degree of pelvic floor laxity.     Lung bases: No definite acute traumatic lung injury bilaterally. No pleural   effusions.     Skeletal structures: Somewhat displaced, impacted, and comminuted fracture of   the upper aspect of the left femur, largely involving the intertrochanteric and   to some extent the basicervical regions. Normally located left femoral head. No   acute fracture or subluxation is seen elsewhere. Spinal degenerative changes.   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 A degree of bone   demineralization is also noted.       Impression:       1. Fractured upper aspect of the left femur.  2. No acute traumatic injury is seen elsewhere in the abdomen or pelvis.   3. A few small gallstones without acute cholecystitis.  4. Colonic diverticula without acute diverticulitis.   5. Other findings as above.     THIS DOCUMENT HAS BEEN ELECTRONICALLY SIGNED BY SMILEY LOZADA MD    CT Chest Without Contrast [[id number]] Collected:  01/05/19 2029     Updated:  01/05/19 2127    Addenda:        Addition to the TECHNIQUE section: Sagittal reformatted images  were also   created and reviewed.     THIS DOCUMENT HAS BEEN ELECTRONICALLY SIGNED BY SMILEY LOZADA MD  Signed:  01/05/19 2127 by Smiley Lozada MD    Narrative:       EXAM:    CT Chest Without Contrast     EXAM DATE/TIME:    1/5/2019 8:29 PM     CLINICAL HISTORY:    88 years old, female; Injury or trauma; Fall; Initial encounter; Concussion   /head injury; Additional info: Demented elder with HX of unwitnessed fall     TECHNIQUE:    Axial computed tomography images of the chest without intravenous contrast.    All CT scans at this facility use at least one of these dose optimization   techniques: automated exposure control; mA and/or kV adjustment per patient   size (includes targeted exams where dose is matched to clinical indication); or   iterative reconstruction.    Coronal reformatted images were created and reviewed.     COMPARISON:    CR XR CHEST 1 VW 12/26/2018 6:58 PM; no comparison CT scan of the chest is   available.    FINDINGS:     Please note: Detection of traumatic injury is limited by the absence of   intravenous contrast enhancement. Some overlying artifact from the patient's    arm positioning. Otherwise:    No mediastinal hematoma or pneumomediastinum. No pericardial effusion or   pneumopericardium. Poorly defined coronary artery calcified plaques.   Nonaneurysmal thoracic aorta.     No traumatic lung injury is seen in either lung. No dense consolidations are   seen. Otherwise noted are some tiny cystic-appearing spaces largely   peripherally with scarring and somewhat heterogeneous lung parenchyma that may   reflect groundglass pneumonitis and/or air trapping. It is not certain that an   interstitial pneumonia is present.    No definite pathologic lymph node enlargement.     No pleural effusion bilaterally.     No pneumothorax bilaterally.     No acute fracture or dislocation is seen. Chronic fracture of the posterior   aspects of the left 9th and 10th ribs. Spinal degenerative  changes.    No definite acute traumatic injury is seen in the visualized upper abdomen.       Impression:       1. No acute traumatic injury is seen in the chest.  2. No dense consolidations in either lung. Some groundglass pneumonitis and/or   air trapping might otherwise be present.  Other findings as above.     THIS DOCUMENT HAS BEEN ELECTRONICALLY SIGNED BY SMILEY LOZADA MD    CT Cervical Spine Without Contrast [[id number]] Collected:  01/05/19 2029     Updated:  01/05/19 2113    Narrative:       EXAM:    CT Cervical Spine Without Contrast     EXAM DATE/TIME:    1/5/2019 8:29 PM     CLINICAL HISTORY:    88 years old, female; Injury or trauma; Fall; Initial encounter; Concussion   /head injury; Additional info: Demented elder with HX of unwitnessed fall     TECHNIQUE:    Axial computed tomography images of the cervical spine without intravenous   contrast.    All CT scans at this facility use at least one of these dose optimization   techniques: automated exposure control; mA and/or kV adjustment per patient   size (includes targeted exams where dose is matched to clinical indication); or   iterative reconstruction.    Coronal and sagittal reformatted images were created and reviewed.     COMPARISON:    CT CERVICAL SPINE WO CONTRAST 12/29/2018 11:28 AM     FINDINGS:     No acute fracture or subluxation is seen. Degenerative disc disease, and   facet and uncovertebral arthropathy at multiple levels with neural foraminal   and spinal canal narrowing particularly at C5-C6, although the intervertebral   discs and other soft tissues are overall not optimally characterized. No   suspicious focal blastic or lytic bone lesion is seen. A degree of bone   demineralization is noted. Small accessory C7 ribs.    No gross paraspinous soft tissue injury is seen.       Impression:       1. No acute fracture or subluxation of the cervical spine.   2. Other findings as above.       THIS DOCUMENT HAS BEEN ELECTRONICALLY SIGNED BY  SMILEY LOZADA MD    CT Head Without Contrast [[id number]] Collected:  01/05/19 2029     Updated:  01/05/19 2106    Narrative:       EXAM:    CT Head Without Contrast     EXAM DATE/TIME:    1/5/2019 8:29 PM     CLINICAL HISTORY:    88 years old, female; Injury or trauma; Fall; Initial encounter; Concussion /   head injury; Additional info: Demented elder with HX of unwitnessed fall     TECHNIQUE:    Axial computed tomography images of the head/brain without contrast.    All CT scans at this facility use at least one of these dose optimization   techniques: automated exposure control; mA and/or kV adjustment per patient   size (includes targeted exams where dose is matched to clinical indication); or   iterative reconstruction.     COMPARISON:    CT HEAD WO CONTRAST 12/29/2018 11:28 AM     FINDINGS:     No intracranial hematoma, definite acute infarct, mass or mass-effect,   hydrocephalus, or skull fracture is seen. Please note that CT can underestimate   ischemia up to at least 24 hours after the event. Otherwise, chronic lacunar   infarct toward the left basal ganglia and internal capsule. Probable   microvascular ischemic white matter changes elsewhere. The gray-white matter   interface is preserved. Atrophic changes.     The visualized paranasal sinuses and mastoid air cells are overall   well-aerated.       Impression:       1. No acute intracranial injury or skull fracture.   2. Other findings as above.     THIS DOCUMENT HAS BEEN ELECTRONICALLY SIGNED BY SMILEY LOZADA MD    XR Femur 2 View Left [[id number]] Collected:  01/05/19 1920     Updated:  01/05/19 2101    Narrative:       EXAM:    XR Left Femur, 2 Views     EXAM DATE/TIME:    1/5/2019 7:20 PM     CLINICAL HISTORY:    88 years old, female; Pain; Other: Fall from standing left leg pain     TECHNIQUE:    XR Left femur, AP and lateral views     COMPARISON:    No comparison left femur imaging studies are available.     FINDINGS:   Some overlying artifact.  Otherwise:    Impacted fracture toward the intertrochanteric and basicervical and region of   the left femur with a degree of medial angulation of the proximal major   fragment. The femoral head is otherwise normally located.  No left femur   fracture or dislocation is seen elsewhere. No suspicious focal blastic or lytic   bone lesion.     No radiopaque foreign body.       Impression:       1. Fractured upper aspect of the left femur.  2. Other findings as above.     THIS DOCUMENT HAS BEEN ELECTRONICALLY SIGNED BY SMILEY LOZADA MD          Results for orders placed during the hospital encounter of 11/16/18   Bilateral Carotid Duplex    Narrative · No evidence of hemodynamically significant stenosis of bilateral carotid   arteries.  · Intimal thickening and minimal plaque is noted.          Results for orders placed during the hospital encounter of 11/16/18   Bilateral Carotid Duplex    Narrative · No evidence of hemodynamically significant stenosis of bilateral carotid   arteries.  · Intimal thickening and minimal plaque is noted.          Results for orders placed during the hospital encounter of 11/16/18   Adult Transthoracic Echo Complete W/ Cont if Necessary Per Protocol (With Agitated Saline)    Narrative · Left ventricular systolic function is normal. Estimated EF = 60%.  · Left ventricular diastolic dysfunction (grade I) consistent with   impaired relaxation.  · Mild mitral valve regurgitation is present  · Mild tricuspid valve regurgitation is present.  · Calculated right ventricular systolic pressure from tricuspid   regurgitation is 32 mmHg.  · Negative saline bubble study.            Discharge Details        Discharge Medications      New Medications      Instructions Start Date   artificial tears ointment ophthalmic ointment   Topical, Every 1 Hour PRN      enoxaparin 40 MG/0.4ML solution syringe  Commonly known as:  LOVENOX   40 mg, Subcutaneous, Every 24 Hours, Followed by ASA 325mg daily x 4  weeks.      hydrochlorothiazide 12.5 MG tablet  Commonly known as:  HYDRODIURIL   12.5 mg, Oral, Daily      HYDROcodone-acetaminophen 7.5-325 MG per tablet  Commonly known as:  NORCO   1 tablet, Oral, Every 4 Hours PRN      magnesium hydroxide 2400 MG/10ML suspension suspension  Commonly known as:  MILK OF MAGNESIA   10 mL, Oral, Daily PRN      miconazole 2 % cream  Commonly known as:  MICOTIN   Topical, Every 12 Hours Scheduled         Changes to Medications      Instructions Start Date   potassium chloride 10 MEQ CR tablet  Commonly known as:  K-DUR  What changed:  when to take this   10 mEq, Oral, Daily      QUEtiapine 25 MG tablet  Commonly known as:  SEROquel  What changed:  when to take this   25 mg, Oral, Nightly         Continue These Medications      Instructions Start Date   acetaminophen 325 MG tablet  Commonly known as:  TYLENOL   650 mg, Oral, Every 4 Hours PRN      aspirin 81 MG tablet   81 mg, Oral, Daily      atorvastatin 40 MG tablet  Commonly known as:  LIPITOR   40 mg, Oral, Nightly      busPIRone 5 MG tablet  Commonly known as:  BUSPAR   5 mg, Oral, 3 Times Daily      CALMOSEPTINE EX   1 application, Apply externally, As Needed, Applied to buttocks       cholecalciferol 1000 units tablet  Commonly known as:  VITAMIN D3   1,000 Units, Oral, Daily      docusate sodium 100 MG capsule  Commonly known as:  COLACE   100 mg, Oral, Daily      lisinopril 10 MG tablet  Commonly known as:  PRINIVIL,ZESTRIL   10 mg, Oral, Every 24 Hours Scheduled      loperamide 2 MG capsule  Commonly known as:  IMODIUM   2 mg, Oral, 2 Times Daily PRN      METFORMIN HCL PO   500 mg, Oral, 2 Times Daily      mirtazapine 15 MG tablet  Commonly known as:  REMERON   15 mg, Oral, Nightly      MULTIVITAMIN ADULT PO   Oral      ondansetron 4 MG tablet  Commonly known as:  ZOFRAN   4 mg, Oral, Every 8 Hours PRN      sertraline 25 MG tablet  Commonly known as:  ZOLOFT   25 mg, Oral, Daily      sucralfate 1 g tablet  Commonly known  as:  CARAFATE   1 g, Oral, 4 Times Daily Before Meals & Nightly      vitamin B-12 1000 MCG tablet  Commonly known as:  CYANOCOBALAMIN   1,000 mcg, Oral, Daily             No Known Allergies      Discharge Disposition:  Skilled Nursing Facility (DC - External)    Discharge Diet:  Diet Order   Procedures   • Diet Regular; Nectar / Syrup Thick; Cardiac, Consistent Carbohydrate         Discharge Activity: Weight bearing as tolerated with assistance and assistive equipment.        CODE STATUS:    Code Status and Medical Interventions:   Ordered at: 01/05/19 9974     Limited Support to NOT Include:    Dialysis    Intubation    Artificial Nutrition     Level Of Support Discussed With:    Next of Kin (If No Surrogate)     Code Status:    No CPR     Medical Interventions (Level of Support Prior to Arrest):    Limited         Future Appointments   Date Time Provider Department Center   1/16/2019  1:40 PM Marily Cook PA-C [facility] None       Additional Instructions for the Follow-ups that You Need to Schedule     Discharge Follow-up with PCP   As directed       Currently Documented PCP:    Provider, No Known    PCP Phone Number:    [phone]     Follow Up Details:  Within 1 week of rehab discharge         Discharge Follow-up with Specified Provider: Dr. Yanez; 2 Weeks   As directed      To:  Dr. Yanez    Follow Up:  2 Weeks               Time Spent on Discharge:  35  minutes    Electronically signed by Mana Stafford MD, 01/14/19, 11:11 AM.

## 2019-01-14 NOTE — PLAN OF CARE
Problem: Fall Risk (Adult)  Goal: Identify Related Risk Factors and Signs and Symptoms  Outcome: Outcome(s) achieved Date Met: 01/14/19 01/13/19 0451   Fall Risk (Adult)   Related Risk Factors (Fall Risk) age-related changes;bladder function altered;confusion/agitation;gait/mobility problems;history of falls;fear of falling;fatigue/slow reaction;depression/anxiety;neuro disease/injury;environment unfamiliar   Signs and Symptoms (Fall Risk) presence of risk factors     Goal: Absence of Fall  Outcome: Outcome(s) achieved Date Met: 01/14/19 01/14/19 1341   Fall Risk (Adult)   Absence of Fall achieves outcome       Problem: Skin Injury Risk (Adult)  Goal: Identify Related Risk Factors and Signs and Symptoms  Outcome: Outcome(s) achieved Date Met: 01/14/19    Goal: Skin Health and Integrity  Outcome: Outcome(s) achieved Date Met: 01/14/19 01/14/19 1341   Skin Injury Risk (Adult)   Skin Health and Integrity achieves outcome

## 2019-01-14 NOTE — THERAPY DISCHARGE NOTE
Acute Care - Physical Therapy Discharge Summary  [facility]       Patient Name: Val Hill  : 1930  MRN: [id number]    Today's Date: 2019  Onset of Illness/Injury or Date of Surgery: 18    Date of Referral to PT: 19  Referring Physician: MD Beau       Admit Date: 2019      PT Recommendation and Plan    Visit Dx:    ICD-10-CM ICD-9-CM   1. Closed fracture of left femur, unspecified fracture morphology, unspecified portion of femur, initial encounter (CMS/[facility]) S72.92XA 821.00   2. Dementia without behavioral disturbance, unspecified dementia type F03.90 294.20   3. History of CVA (cerebrovascular accident) Z86.73 V12.54   4. History of diabetes mellitus, type II Z86.39 V12.29   5. Impaired functional mobility, balance, gait, and endurance Z74.09 V49.89   6. Impaired mobility and ADLs Z74.09 799.89       Outcome Measures     Row Name 19 0946 19 0840 19 1408       How much help from another person do you currently need...    Turning from your back to your side while in flat bed without using bedrails?  2  -LR  2  -SC  --    Moving from lying on back to sitting on the side of a flat bed without bedrails?  2  -LR  2  -SC  --    Moving to and from a bed to a chair (including a wheelchair)?  2  -LR  2  -SC  --    Standing up from a chair using your arms (e.g., wheelchair, bedside chair)?  2  -LR  2  -SC  --    Climbing 3-5 steps with a railing?  1  -LR  1  -SC  --    To walk in hospital room?  1  -LR  2  -SC  --    AM-PAC 6 Clicks Score  10  -LR  11  -SC  --       How much help from another is currently needed...    Putting on and taking off regular lower body clothing?  --  --  1  -ST    Bathing (including washing, rinsing, and drying)  --  --  1  -ST    Toileting (which includes using toilet bed pan or urinal)  --  --  1  -ST    Putting on and taking off regular upper body clothing  --  --  2  -ST    Taking care of personal grooming (such as brushing teeth)  --   --  2  -ST    Eating meals  --  --  2  -ST    Score  --  --  9  -ST       Functional Assessment    Outcome Measure Options  AM-PAC 6 Clicks Basic Mobility (PT)  -LR  AM-PAC 6 Clicks Basic Mobility (PT)  -SC  --      User Key  (r) = Recorded By, (t) = Taken By, (c) = Cosigned By    Initials Name Provider Type    SC Yamil Keating, PT Physical Therapist    ST Bridgette Huggins, OTR Occupational Therapist    LR Josette Handley, PT Physical Therapist            Therapy Suggested Charges     Code   Minutes Charges    60860 (CPT®) Hc Pt Neuromusc Re Education Ea 15 Min      56625 (CPT®) Hc Pt Ther Proc Ea 15 Min 4     75698 (CPT®) Hc Gait Training Ea 15 Min      15603 (CPT®) Hc Pt Therapeutic Act Ea 15 Min 10 1    91708 (CPT®) Hc Pt Manual Therapy Ea 15 Min      67261 (CPT®) Hc Pt Iontophoresis Ea 15 Min      43174 (CPT®) Hc Pt Elec Stim Ea-Per 15 Min      60596 (CPT®) Hc Pt Ultrasound Ea 15 Min      79910 (CPT®) Hc Pt Self Care/Mgmt/Train Ea 15 Min      09046 (CPT®) Hc Pt Prosthetic (S) Train Initial Encounter, Each 15 Min      31491 (CPT®) Hc Pt Orthotic(S)/Prosthetic(S) Encounter, Each 15 Min      03435 (CPT®) Hc Orthotic(S) Mgmt/Train Initial Encounter, Each 15min      Total  14 1          Rehab Goal Summary     Row Name 01/14/19 1128             Physical Therapy Goals    Bed Mobility Goal Selection (PT)  bed mobility, PT goal 1  -MJ      Transfer Goal Selection (PT)  transfer, PT goal 1  -MJ      Gait Training Goal Selection (PT)  gait training, PT goal 1  -MJ         Bed Mobility Goal 1 (PT)    Activity/Assistive Device (Bed Mobility Goal 1, PT)  sit to supine/supine to sit  -MJ      [place] Level/Cues Needed (Bed Mobility Goal 1, PT)  moderate assist (50-74% patient effort)  -MJ      Time Frame (Bed Mobility Goal 1, PT)  5 days;long term goal (LTG)  -      Progress/Outcomes (Bed Mobility Goal 1, PT)  goal not met;discharged from facility  -         Transfer Goal 1 (PT)    Activity/Assistive Device  (Transfer Goal 1, PT)  sit-to-stand/stand-to-sit;walker, rolling  -MJ      [place] Level/Cues Needed (Transfer Goal 1, PT)  moderate assist (50-74% patient effort)  -MJ      Time Frame (Transfer Goal 1, PT)  5 days;long term goal (LTG)  -MJ      Progress/Outcome (Transfer Goal 1, PT)  goal not met;discharged from facility  -MJ         Gait Training Goal 1 (PT)    Activity/Assistive Device (Gait Training Goal 1, PT)  gait (walking locomotion);walker, rolling  -MJ      [place] Level (Gait Training Goal 1, PT)  moderate assist (50-74% patient effort) x2  -MJ      Distance (Gait Goal 1, PT)  5  -MJ      Time Frame (Gait Training Goal 1, PT)  5 days;long term goal (LTG)  -MJ      Progress/Outcome (Gait Training Goal 1, PT)  goal not met;discharged from facility  -MJ        User Key  (r) = Recorded By, (t) = Taken By, (c) = Cosigned By    Initials Name Provider Type Discipline    Forrest Singh, PT Physical Therapist PT              PT Discharge Summary  Anticipated Discharge Disposition (PT): skilled nursing facility  Reason for Discharge: Discharge from facility  Outcomes Achieved: Patient able to partially acheive established goals  Discharge Destination: SNF      Forrest Doe, IBETH   1/14/2019

## 2019-01-23 NOTE — PROGRESS NOTES
[facility] [place]    Subjective     Patient: Val Hill  : 1930    Primary Care Provider: Provider, No Known    Requesting Provider: As above    Follow-up of the Left Wrist (1 month f/u/Closed Colles' fracture of left radius)      History    Chief Complaint: f/up L distal radius    History of Present Illness: Patient returns today with family above her left distal radius fracture.  She's been casted for 1 month.  She reports no pain.  She was unable to get her last appointment secondary to hip fracture treated by Dr. Yanez.    Current Outpatient Medications on File Prior to Visit   Medication Sig Dispense Refill   • acetaminophen (TYLENOL) 325 MG tablet Take 2 tablets by mouth Every 4 (Four) Hours As Needed for Mild Pain  or Fever (Temperature greater than or equal to 37 C). 1 bottle 0   • artificial tears (LUBRIFRESH P.M.) ointment ophthalmic ointment Apply  topically to the appropriate area as directed Every 1 (One) Hour As Needed (For eye irratation).     • aspirin 81 MG tablet Take 81 mg by mouth Daily.     • atorvastatin (LIPITOR) 40 MG tablet Take 1 tablet by mouth Every Night. 30 tablet 0   • busPIRone (BUSPAR) 5 MG tablet Take 5 mg by mouth 3 (Three) Times a Day.     • cholecalciferol (VITAMIN D3) 1000 units tablet Take 1,000 Units by mouth Daily.     • docusate sodium (COLACE) 100 MG capsule Take 100 mg by mouth Daily.     • enoxaparin (LOVENOX) 40 MG/0.4ML solution syringe Inject 0.4 mL under the skin into the appropriate area as directed Daily for 10 days. Followed by [facility] 325mg daily x 4 weeks. 11.2 mL    • hydrochlorothiazide (HYDRODIURIL) 12.5 MG tablet Take 1 tablet by mouth Daily.     • lisinopril (PRINIVIL,ZESTRIL) 10 MG tablet Take 1 tablet by mouth Daily. 30 tablet 0   • loperamide (IMODIUM) 2 MG capsule Take 2 mg by mouth 2 (Two) Times a Day As Needed for Diarrhea.     • magnesium hydroxide (MILK OF MAGNESIA) 2400 MG/10ML suspension suspension Take 10  mL by mouth Daily As Needed (Constipation). 10 mL    • Menthol-Zinc Oxide (CALMOSEPTINE EX) Apply 1 application topically As Needed. Applied to buttocks     • METFORMIN HCL PO Take 500 mg by mouth 2 (Two) Times a Day.     • miconazole (MICOTIN) 2 % cream Apply  topically to the appropriate area as directed Every 12 (Twelve) Hours.     • mirtazapine (REMERON) 15 MG tablet Take 15 mg by mouth Every Night.     • Multiple Vitamins-Minerals (MULTIVITAMIN ADULT PO) Take  by mouth.     • ondansetron (ZOFRAN) 4 MG tablet Take 4 mg by mouth Every 8 (Eight) Hours As Needed for Nausea or Vomiting.     • potassium chloride (K-DUR) 10 MEQ CR tablet Take 1 tablet by mouth Daily. (Patient taking differently: Take 10 mEq by mouth 2 (Two) Times a Day.) 7 tablet 0   • QUEtiapine (SEROquel) 25 MG tablet Take 1 tablet by mouth Every Night. (Patient taking differently: Take 25 mg by mouth Daily.) 30 tablet 0   • sertraline (ZOLOFT) 25 MG tablet Take 25 mg by mouth Daily.     • sucralfate (CARAFATE) 1 G tablet Take 1 tablet by mouth 4 (Four) Times a Day Before Meals & at Bedtime. 60 tablet 0   • vitamin B-12 (CYANOCOBALAMIN) 1000 MCG tablet Take 1,000 mcg by mouth Daily.       No current facility-administered medications on file prior to visit.       No Known Allergies   Past Medical History:   Diagnosis Date   • Dementia    • Diabetes mellitus (CMS/HCC)    • Hyperlipidemia    • Hypertension    • Stroke (CMS/HCC)      Past Surgical History:   Procedure Laterality Date   • ELBOW PROCEDURE     • HIP TROCANTERIC NAILING WITH INTRAMEDULLARY HIP SCREW Left 1/6/2019    Procedure: HIP TROCANTERIC NAILING WITH INTRAMEDULLARY HIP SCREW LEFT;  Surgeon: Hola Yanez Jr., MD;  Location: [facility] OR;  Service: Orthopedics   • HYSTERECTOMY       Family History   Problem Relation Age of Onset   • Cancer Father       Social History     Socioeconomic History   • Marital status:      Spouse name: Not on file   • Number of children: Not on file  "  • Years of education: Not on file   • Highest education level: Not on file   Social Needs   • Financial resource strain: Not on file   • Food insecurity - worry: Not on file   • Food insecurity - inability: Not on file   • Transportation needs - medical: Not on file   • Transportation needs - non-medical: Not on file   Occupational History   • Not on file   Tobacco Use   • Smoking status: Former Smoker   • Smokeless tobacco: Never Used   Substance and Sexual Activity   • Alcohol use: No   • Drug use: No   • Sexual activity: Defer   Other Topics Concern   • Not on file   Social History Narrative   • Not on file        Review of Systems   Constitutional: Positive for appetite change and fatigue.   HENT: Negative.    Eyes: Positive for discharge.   Respiratory: Negative.    Cardiovascular: Negative.    Gastrointestinal: Positive for diarrhea.   Endocrine: Positive for cold intolerance.   Genitourinary: Negative.    Musculoskeletal: Positive for arthralgias.   Skin: Negative.    Allergic/Immunologic: Negative.    Neurological: Positive for weakness.   Hematological: Negative.    Psychiatric/Behavioral: Positive for agitation and decreased concentration. The patient is nervous/anxious.        The following portions of the patient's history were reviewed and updated as appropriate: allergies, current medications, past family history, past medical history, past social history, past surgical history and problem list.      Objective      Physical Exam  Pulse 105   Ht 170.2 cm (67.01\")   Wt 45.7 kg (100 lb 12 oz)   SpO2 99%   BMI 15.78 kg/m²     Body mass index is 15.78 kg/m².      Ortho Exam  Left wrist exam:  Patient is mildly tender over the distal radius  Able to make a composite fist with intact EPL, intrinsics.  NVI with pulses 2+    Medical Decision Making    Data Review:   ordered and reviewed x-rays today    Assessment:  1. Fracture follow-up    2. Closed Colles' fracture of left radius, initial encounter  "       Plan:  Doing well with nonoperative treatment of left distal radius fracture.  X-rays in the cast today shows increased volar tilt and mild shortening compared to prior x-rays.  I explained to the patient and her family that I think this will heal well and will not affect her function.  Plan today is to get her out of the cast into a removable brace.  She should wear the brace at all times except to come out for bathing.  She will return to see us in 1 month or with repeat x-rays of the left wrist or sooner if needed.      Marily Cook PA-C  01/24/19  1:17 PM

## 2019-01-29 NOTE — TELEPHONE ENCOUNTER
DANAE FROM [facility] IS A PHYSICAL THERAPIST AND  WAS CALLING TO FOLLOW UP ON THIS PATIENT'S VISIT REGARDING HER LEFT WRIST. SHE WAS SEEN ON 1/23/2019 AND A HARD CAST WAS REMOVED FROM HER LEFT WRIST AND WAS MOVED INTO A REMOVABLE BRACE. HE WAS WANTING TO KNOW WHAT THE PATIENT'S WEIGHTBEARING STATUS SHOULD BE. HE CAN BE REACHED -[phone].

## 2019-01-29 NOTE — TELEPHONE ENCOUNTER
Attempted to return Remi's call. Extension incorrect.    Left vm for him regarding non weight bearing status for patient. 1143am.

## 2019-01-29 NOTE — TELEPHONE ENCOUNTER
Marily,  What restrictions and weight bearing status would you like for this patient to have during physical therapy?  Thanks,

## 2019-01-29 NOTE — TELEPHONE ENCOUNTER
Patient is NWB on the left wrist.  She should be using a removable brace at all times except for bathing.

## 2019-01-30 NOTE — TELEPHONE ENCOUNTER
Called back to Remi, wasn't available but left restriction information with another therapist, Jessika.

## 2019-05-30 NOTE — PROGRESS NOTES
98 Orthopedic Daily Progress Note      CC: JAY overnight.    Pain well controlled  General: no fevers, chills  Abdomen: no nausea, vomiting, or diarrhea    No other complaints    Physical Exam:  I have reviewed the vital signs.  Temp:  [96.1 °F (35.6 °C)-98.2 °F (36.8 °C)] 98.2 °F (36.8 °C)  Heart Rate:  [67-99] 79  Resp:  [16] 16  BP: (129-189)/(67-85) 165/67    Objective  General Appearance:    Alert, cooperative, no distress  Extremities: No clubbing, cyanosis, or edema to lower extremities  Pulses:  2+ in distal surgical extremity  Skin: Dressing Clean/dry/intact      Results Review:    I have reviewed the labs, radiology results and diagnostic studies:no new labs    Results from last 7 days   Lab Units  01/12/19   1247  01/11/19   0426   WBC 10*3/mm3   --   10.74   HEMOGLOBIN g/dL  11.0*  10.3*   PLATELETS 10*3/mm3   --   419     Results from last 7 days   Lab Units  01/11/19   1823  01/11/19   0426   SODIUM mmol/L   --   135   POTASSIUM mmol/L  3.8  2.9*   CO2 mmol/L   --   28.0   CREATININE mg/dL   --   0.39*   GLUCOSE mg/dL   --   157*       I have reviewed the medications.    Assessment/Problem List  POD# 6 Days Post-Op   S/p L TFN for IT femur fracture    Plan  WBAT LLE  PT/OT  Ok for d/c from Ortho standpoint  Mechanical DVT ppx    Discharge Planning: I expect patient to be discharged to TBD in TBD days.    Hola Yanez Jr., MD  01/12/19  4:14 PM

## 2020-01-23 NOTE — THERAPY TREATMENT NOTE
Acute Care - Physical Therapy Treatment Note  [facility]     Patient Name: Val Hill  : 1930  MRN: [id number]  Today's Date: 2019  Onset of Illness/Injury or Date of Surgery: 18  Date of Referral to PT: 19  Referring Physician: MD eBau     Admit Date: 2019    Visit Dx:    ICD-10-CM ICD-9-CM   1. Closed fracture of left femur, unspecified fracture morphology, unspecified portion of femur, initial encounter (CMS/Formerly [facility]) S72.92XA 821.00   2. Dementia without behavioral disturbance, unspecified dementia type F03.90 294.20   3. History of CVA (cerebrovascular accident) Z86.73 V12.54   4. History of diabetes mellitus, type II Z86.39 V12.29   5. Impaired functional mobility, balance, gait, and endurance Z74.09 V49.89   6. Impaired mobility and ADLs Z74.09 799.89     Patient Active Problem List   Diagnosis   • Stroke (CMS/HCC)   • UTI (urinary tract infection)   • Metabolic encephalopathy   • Type 2 diabetes mellitus (CMS/Formerly [facility])   • Dementia   • Closed fracture of left femur (CMS/Formerly [facility])   • Gallstones   • Chronic diastolic CHF (congestive heart failure) (CMS/Formerly [facility])       Therapy Treatment    Rehabilitation Treatment Summary     Row Name 19 0950 19 0946          Treatment Time/Intention    Discipline  --  -LR  physical therapist  -LR     Document Type  --  therapy note (daily note)  -LR     Subjective Information  --  complains of;pain  -LR     Mode of Treatment  --  physical therapy;individual therapy  -LR     Patient/Family Observations  --  Patient sitting reclined [facility] with both hips and knees flexed. IV, exit alarm, purewick.   -LR     Care Plan Review  --  care plan/treatment goals reviewed;risks/benefits reviewed;current/potential barriers reviewed;patient/other agree to care plan  -LR     Patient Effort  --  adequate  -LR     Existing Precautions/Restrictions  --  fall;other (see comments) dementia, exit alarms, purewick  -LR     Treatment Considerations/Comments  --  L  UE cast.   -LR     Recorded by [LR] Josette Handley, PT 19 1110 [LR] Josette Handley, PT 19 1110     Row Name 19 0946             Cognitive Assessment/Intervention- PT/OT    Affect/Mental Status (Cognitive)  confused  -LR      Orientation Status (Cognition)  oriented to;person;disoriented to;place;situation;time;verbal cues/prompts needed for orientation;other (see comments) stated name and , stated she was at a hotel  -LR      Follows Commands (Cognition)  follows one step commands;50-74% accuracy;verbal cues/prompting required;repetition of directions required;physical/tactile prompts required  -LR      Safety Deficit (Cognitive)  ability to follow commands;at risk behavior observed;moderate deficit;awareness of need for assistance;insight into deficits/self awareness;judgment;problem solving;safety precautions awareness;safety precautions follow-through/compliance  -LR      Recorded by [LR] Josette Handley, PT 19 1110      Row Name 19 0946             Safety Issues, Functional Mobility    Safety Issues Affecting Function (Mobility)  ability to follow commands;at risk behavior observed;awareness of need for assistance;insight into deficits/self awareness;judgment;problem solving;safety precaution awareness;safety precautions follow-through/compliance;sequencing abilities  -LR      Recorded by [LR] Josette Handley, PT 19 1110      Row Name 19 0946             Mobility Assessment/Intervention    Extremity Weight-bearing Status  left lower extremity  -LR      Left Lower Extremity (Weight-bearing Status)  weight-bearing as tolerated (WBAT)  -LR      Recorded by [LR] Josette Handley, PT 19 1110      Row Name 19 0946             Bed Mobility Assessment/Treatment    Supine-Sit [place] (Bed Mobility)  not tested UIC on arrival.   -LR      Comment (Bed Mobility)  UIC at end of treatment.   -LR      Recorded by [LR] Emmanuelle  Josette Richardson, PT 01/13/19 1110      Row Name 01/13/19 0946             Transfer Assessment/Treatment    Transfer Assessment/Treatment  sit-stand transfer;stand-sit transfer  -LR      Comment (Transfers)  Verbal cues to push up from chair to stand and to reach back for chair to lower into sitting. Verbal cues to keep R foot under her, patient allowed R foot to slide out on first stand. Feet blocked. Patient unable to achieve adequate static standing balance to attempt to take steps. Performed sit to stand x4 with posture and balance improving with each stand.   -LR      Recorded by [LR] Josette Handley, PT 01/13/19 1110      Row Name 01/13/19 0946             Sit-Stand Transfer    Sit-Stand [place] (Transfers)  verbal cues;maximum assist (25% patient effort);2 person assist  -LR      Assistive Device (Sit-Stand Transfers)  other (see comments) B UE support, support at gait belt.   -LR      Recorded by [LR] Josette Handley, PT 01/13/19 1110      Row Name 01/13/19 0946             Stand-Sit Transfer    Stand-Sit [place] (Transfers)  verbal cues;maximum assist (25% patient effort);2 person assist  -LR      Assistive Device (Stand-Sit Transfers)  other (see comments) B UE support, support at gait belt.   -LR      Recorded by [LR] Josette Handley, PT 01/13/19 1110      Row Name 01/13/19 0946             Gait/Stairs Assessment/Training    [place] Level (Gait)  unable to assess;not tested  -LR      Comment (Gait/Stairs)  Patient unable to achieve adequate static standing balance to allow for attempts at ambulation.   -LR      Recorded by [LR] Josette Handley, PT 01/13/19 1110      Row Name 01/13/19 0946             Therapeutic Exercise    79097 - PT Therapeutic Exercise Minutes  4  -LR      58403 - PT Therapeutic Activity Minutes  10  -LR      Recorded by [LR] Josette Handley, PT 01/13/19 1110      Row Name 01/13/19 0946             Therapeutic Exercise    Lower Extremity  (Therapeutic Exercise)  gluteal sets;heel slides, left;LAQ (long arc quad), left;quad sets, left;SAQ (short arc quad), left;SLR (straight leg raise), left  -LR      Lower Extremity Range of Motion (Therapeutic Exercise)  hip abduction/adduction, left;ankle dorsiflexion/plantar flexion, left  -LR      Exercise Type (Therapeutic Exercise)  isometric contraction, static;isotonic contraction, concentric;AAROM (active assistive range of motion)  -LR      Position (Therapeutic Exercise)  seated  -LR      Sets/Reps (Therapeutic Exercise)  x10 reps each  -LR      Comment (Therapeutic Exercise)  cues for technique; mod assist SLR, heel slides, min assist hip abd, SAQ  -LR      Recorded by [LR] Josette Handley, PT 01/13/19 1110      Row Name 01/13/19 0946             Static Standing Balance    Level of [place] (Supported Standing, Static Balance)  moderate assist, 50 to 74% patient effort x2  -LR      Time Able to Maintain Position (Supported Standing, Static Balance)  15 to 30 seconds x4  -LR      Assistive Device Utilized (Supported Standing, Static Balance)  other (see comments) B UE support, support at gait belt  -LR      Recorded by [LR] Josette Handley, PT 01/13/19 1110      Row Name 01/13/19 0946             Positioning and Restraints    Pre-Treatment Position  sitting in chair/recliner  -LR      Post Treatment Position  chair  -LR      In Chair  notified nsg;reclined;sitting;call light within reach;encouraged to call for assist;exit alarm on;legs elevated pillow under knees  -LR      Recorded by [LR] Josette Handley, PT 01/13/19 1110      Row Name 01/13/19 0946             Pain Assessment    Additional Documentation  Pain Scale: FACES Pre/Post-Treatment (Group)  -LR      Recorded by [LR] Josette Handley, PT 01/13/19 1110      Row Name 01/13/19 0946             Pain Scale: Numbers Pre/Post-Treatment    Pain Location - Side  Left  -LR      Pain Location  hip  -LR      Pain  Intervention(s)  Repositioned;Ambulation/increased activity;Medication (See MAR) pre-medicated  -LR      Recorded by [LR] Josette Handley, PT 01/13/19 1110      Row Name 01/13/19 0946             Pain Scale: FACES Pre/Post-Treatment    Pain: FACES Scale, Pretreatment  8-->hurts whole lot  -LR      Pain: FACES Scale, Post-Treatment  4-->hurts little more  -LR      Recorded by [LR] Josette Handley, PT 01/13/19 1110      Row Name                Wound 01/06/19 1407 Left hip incision    Wound - Properties Group Date first assessed: 01/06/19 [TB] Time first assessed: 1407 [TB] Side: Left [TB] Location: hip [TB] Type: incision [TB] Recorded by:  [TB] Kirti Bishop, RN 01/06/19 1407    Row Name 01/13/19 0946             Coping    Observed Emotional State  accepting;cooperative  -LR      Verbalized Emotional State  acceptance  -LR      Recorded by [LR] Josette Handley, PT 01/13/19 1110      Row Name 01/13/19 0946             Plan of Care Review    Plan of Care Reviewed With  patient  -LR      Recorded by [LR] Josette Handley, PT 01/13/19 1110      Row Name 01/13/19 0946             Outcome Summary/Treatment Plan (PT)    Daily Summary of Progress (PT)  progress toward functional goals is gradual  -LR      Recorded by [LR] Josette Handley, PT 01/13/19 1110        User Key  (r) = Recorded By, (t) = Taken By, (c) = Cosigned By    Initials Name Effective Dates Discipline    LR Josette Handley, PT 06/19/15 -  PT    TB Kirti Bishop, RN 06/16/16 -  Nurse          Wound 01/06/19 1407 Left hip incision (Active)   Dressing Appearance dried drainage;dressing loose 1/13/2019  8:00 AM   Closure [place] 1/13/2019  8:00 AM   Drainage Amount small 1/13/2019  8:00 AM   Dressing Care, Wound border dressing 1/13/2019  8:00 AM           Physical Therapy Education     Title: PT OT SLP Therapies (In Progress)     Topic: Physical Therapy (In Progress)     Point: Mobility training (In Progress)      Learning Progress Summary           Patient Acceptance, E,D, NR by LR at 1/13/2019  9:46 AM    Comment:  Educated on benefits of mobility, HEP, correct sit<->stand t/f technique, correct posture, and progression of POC.    Acceptance, E,TB, VU by  at 1/12/2019  4:12 PM    Comment:  discussed w/ son at bedside.    Acceptance, E, VU,DU,NR by SC at 1/12/2019  8:40 AM    Comment:  REVIEWED BENEFITS OF ACTIVITY    Nonacceptance, E,TB,D, NL by  at 1/9/2019  7:09 PM    Comment:  pt very confused    Acceptance, E,D, NR by ALONSO at 1/9/2019 11:36 AM    Comment:  Reviewed HEP, benefits of mobility    Acceptance, E,D, NR by ALONSO at 1/8/2019  2:10 PM    Comment:  Reviewed HEP, benefits of mobility    Acceptance, E,D, NR by ALONSO at 1/7/2019  8:40 AM    Comment:  Edu re: benefits of mobility, WB status   Family Acceptance, E,TB, VU by  at 1/12/2019  4:12 PM    Comment:  discussed w/ son at bedside.                   Point: Home exercise program (In Progress)     Learning Progress Summary           Patient Acceptance, E,D, NR by REYNALDO at 1/13/2019  9:46 AM    Comment:  Educated on benefits of mobility, HEP, correct sit<->stand t/f technique, correct posture, and progression of POC.    Acceptance, E,TB, VU by  at 1/12/2019  4:12 PM    Comment:  discussed w/ son at bedside.    Acceptance, E, VU,DU,NR by SC at 1/12/2019  8:40 AM    Comment:  REVIEWED BENEFITS OF ACTIVITY    Nonacceptance, E,TB,D, NL by  at 1/9/2019  7:09 PM    Comment:  pt very confused    Acceptance, E,D, NR by ALONSO at 1/9/2019 11:36 AM    Comment:  Reviewed HEP, benefits of mobility    Acceptance, E,D, NR by ALONSO at 1/8/2019  2:10 PM    Comment:  Reviewed HEP, benefits of mobility   Family Acceptance, E,TB, VU by  at 1/12/2019  4:12 PM    Comment:  discussed w/ son at bedside.                   Point: Body mechanics (In Progress)     Learning Progress Summary           Patient Acceptance, E,D, NR by LR at 1/13/2019  9:46 AM    Comment:  Educated on benefits of  mobility, HEP, correct sit<->stand t/f technique, correct posture, and progression of POC.    Acceptance, E,TB, VU by  at 1/12/2019  4:12 PM    Comment:  discussed w/ son at bedside.    Acceptance, E, VU,DU,NR by SC at 1/12/2019  8:40 AM    Comment:  REVIEWED BENEFITS OF ACTIVITY    Nonacceptance, E,TB,D, NL by  at 1/9/2019  7:09 PM    Comment:  pt very confused    Acceptance, E,D, NR by ALONSO at 1/9/2019 11:36 AM    Comment:  Reviewed HEP, benefits of mobility    Acceptance, E,D, NR by ALONSO at 1/8/2019  2:10 PM    Comment:  Reviewed HEP, benefits of mobility    Acceptance, E,D, NR by ALONSO at 1/7/2019  8:40 AM    Comment:  Edu re: benefits of mobility, WB status   Family Acceptance, E,TB, VU by  at 1/12/2019  4:12 PM    Comment:  discussed w/ son at bedside.                   Point: Precautions (In Progress)     Learning Progress Summary           Patient Acceptance, E,D, NR by REYNALDO at 1/13/2019  9:46 AM    Comment:  Educated on benefits of mobility, HEP, correct sit<->stand t/f technique, correct posture, and progression of POC.    Acceptance, E,TB, VU by  at 1/12/2019  4:12 PM    Comment:  discussed w/ son at bedside.    Acceptance, E, VU,DU,NR by SC at 1/12/2019  8:40 AM    Comment:  REVIEWED BENEFITS OF ACTIVITY    Nonacceptance, E,TB,D, NL by  at 1/9/2019  7:09 PM    Comment:  pt very confused    Acceptance, E,D, NR by ALONSO at 1/9/2019 11:36 AM    Comment:  Reviewed HEP, benefits of mobility    Acceptance, E,D, NR by ALONSO at 1/8/2019  2:10 PM    Comment:  Reviewed HEP, benefits of mobility    Acceptance, E,D, NR by ALONSO at 1/7/2019  8:40 AM    Comment:  Edu re: benefits of mobility, WB status   Family Acceptance, E,TB, VU by  at 1/12/2019  4:12 PM    Comment:  discussed w/ son at bedside.                               User Key     Initials Effective Dates Name Provider Type Discipline    SC 06/19/15 -  Yamil Keating PT Physical Therapist PT    REYNALDO 06/19/15 -  Josette Handley, PT Physical Therapist PT     Airway patent, TM normal bilaterally, normal appearing mouth, nose, throat, neck supple with full range of motion, no cervical adenopathy. MJ 04/03/18 -  Forrest Doe, PT Physical Therapist PT    JR 02/06/17 -  Luis Saldana, RN Registered Nurse Nurse                PT Recommendation and Plan     Outcome Summary/Treatment Plan (PT)  Daily Summary of Progress (PT): progress toward functional goals is gradual  Plan of Care Reviewed With: patient  Progress: no change  Outcome Summary: Patient able to stand from chair 4 times, max assist x2 to do so, but unable to achieve adequate static standing balance to attempt to take steps from chair. Balance and posture improved with each stand and was able to stand longer with each stand. Patient oriented to self but remains confused to place/time/situation. Will continue to progress mobility training and strengthening as able.   Outcome Measures     Row Name 01/13/19 0946 01/12/19 0840 01/11/19 1408       How much help from another person do you currently need...    Turning from your back to your side while in flat bed without using bedrails?  2  -LR  2  -SC  --    Moving from lying on back to sitting on the side of a flat bed without bedrails?  2  -LR  2  -SC  --    Moving to and from a bed to a chair (including a wheelchair)?  2  -LR  2  -SC  --    Standing up from a chair using your arms (e.g., wheelchair, bedside chair)?  2  -LR  2  -SC  --    Climbing 3-5 steps with a railing?  1  -LR  1  -SC  --    To walk in hospital room?  1  -LR  2  -SC  --    AM-PAC 6 Clicks Score  10  -LR  11  -SC  --       How much help from another is currently needed...    Putting on and taking off regular lower body clothing?  --  --  1  -ST    Bathing (including washing, rinsing, and drying)  --  --  1  -ST    Toileting (which includes using toilet bed pan or urinal)  --  --  1  -ST    Putting on and taking off regular upper body clothing  --  --  2  -ST    Taking care of personal grooming (such as brushing teeth)  --  --  2  -ST    Eating meals  --  --  2  -ST    Score  --  --  9  -ST       Functional Assessment    Outcome  Measure Options  AM-PAC 6 Clicks Basic Mobility (PT)  -LR  AM-PAC 6 Clicks Basic Mobility (PT)  -SC  --      User Key  (r) = Recorded By, (t) = Taken By, (c) = Cosigned By    Initials Name Provider Type    SC Yamil Keating, PT Physical Therapist    Bridgette Ramírez, OTR Occupational Therapist    LR Josette Handley, PT Physical Therapist         Time Calculation:   PT Charges     Row Name 01/13/19 0946             Time Calculation    Start Time  0946  -LR      PT Received On  01/13/19  -LR      PT Goal Re-Cert Due Date  01/17/19  -LR         Time Calculation- PT    Total Timed Code Minutes- PT  14 minute(s)  -LR         Timed Charges    57620 - PT Therapeutic Exercise Minutes  4  -LR      22265 - PT Therapeutic Activity Minutes  10  -LR        User Key  (r) = Recorded By, (t) = Taken By, (c) = Cosigned By    Initials Name Provider Type    LR Josette Handley, PT Physical Therapist        Therapy Suggested Charges     Code   Minutes Charges    68975 (CPT®) Hc Pt Neuromusc Re Education Ea 15 Min      87899 (CPT®) Hc Pt Ther Proc Ea 15 Min 4     62522 (CPT®) Hc Gait Training Ea 15 Min      81551 (CPT®) Hc Pt Therapeutic Act Ea 15 Min 10 1    29800 (CPT®) Hc Pt Manual Therapy Ea 15 Min      63744 (CPT®) Hc Pt Iontophoresis Ea 15 Min      40224 (CPT®) Hc Pt Elec Stim Ea-Per 15 Min      28650 (CPT®) Hc Pt Ultrasound Ea 15 Min      55485 (CPT®) Hc Pt Self Care/Mgmt/Train Ea 15 Min      75217 (CPT®) Hc Pt Prosthetic (S) Train Initial Encounter, Each 15 Min      16208 (CPT®) Hc Pt Orthotic(S)/Prosthetic(S) Encounter, Each 15 Min      94246 (CPT®) Hc Orthotic(S) Mgmt/Train Initial Encounter, Each 15min      Total  14 1        Therapy Charges for Today     Code Description Service Date Service Provider Modifiers Qty    58179136062 HC PT THERAPEUTIC ACT EA 15 MIN 1/13/2019 Josette Handley, PT GP 1    89169888077 HC PT THER SUPP EA 15 MIN 1/13/2019 Josette Handley, PT GP 1          PT  G-Codes  Outcome Measure Options: AM-PAC 6 Clicks Basic Mobility (PT)  AM-PAC 6 Clicks Score: 10  Score: 9    Josette Handley, PT  1/13/2019        DISPLAY PLAN FREE TEXT

## (undated) DEVICE — Device

## (undated) DEVICE — SUCTION CANISTER, 2500CC, RIGID: Brand: DEROYAL

## (undated) DEVICE — PK MAJ FX HIP 10

## (undated) DEVICE — BNDG ELAS ELITE V/CLOSE 4IN 5YD LF STRL

## (undated) DEVICE — PAD ARMBRD SURG CONVOL 7.5X20X2IN

## (undated) DEVICE — BIT DRL 3FLUT QC CALIB 4.2X330X100MM

## (undated) DEVICE — DRSNG SURG AQUACEL AG 9X30CM

## (undated) DEVICE — SPK10295 ORTHOPEDIC FRACTURE KIT: Brand: SPK10295 ORTHOPEDIC FRACTURE KIT

## (undated) DEVICE — CVR HNDL LT SURG ACCSSRY BLU STRL

## (undated) DEVICE — DRAPE,TOP,102X53,STERILE: Brand: MEDLINE

## (undated) DEVICE — PROXIMATE RH ROTATING HEAD SKIN STAPLERS (35 WIDE) CONTAINS 35 STAINLESS STEEL STAPLES: Brand: PROXIMATE

## (undated) DEVICE — GOWN,REINF,POLY,ECL,PP SLV,XL: Brand: MEDLINE

## (undated) DEVICE — KT DEL CMT TRAUMA NDL CANN W/ SD OPN 3.3MM

## (undated) DEVICE — COVER,MAYO STAND,STERILE: Brand: MEDLINE

## (undated) DEVICE — GLV SURG SENSICARE MICRO PF LF 8 STRL

## (undated) DEVICE — SNAP KOVER: Brand: UNBRANDED

## (undated) DEVICE — ANTIBACTERIAL UNDYED BRAIDED (POLYGLACTIN 910), SYNTHETIC ABSORBABLE SUTURE: Brand: COATED VICRYL

## (undated) DEVICE — GLV SURG TRIUMPH ORTHO W/ALOE PF LTX 8 STRL

## (undated) DEVICE — LEGGINGS, PAIR, 29X43, STERILE: Brand: MEDLINE

## (undated) DEVICE — GW FOR TROCH NAIL 3.2X400MM

## (undated) DEVICE — SUT ETHLN 3/0 PC5 18IN 1893G

## (undated) DEVICE — KT DEL CMT TRAUMA SYR 4X1ML 2X2ML